# Patient Record
Sex: FEMALE | Race: WHITE | NOT HISPANIC OR LATINO | Employment: OTHER | URBAN - METROPOLITAN AREA
[De-identification: names, ages, dates, MRNs, and addresses within clinical notes are randomized per-mention and may not be internally consistent; named-entity substitution may affect disease eponyms.]

---

## 2024-10-20 ENCOUNTER — HOSPITAL ENCOUNTER (INPATIENT)
Facility: HOSPITAL | Age: 79
DRG: 180 | End: 2024-10-20
Attending: STUDENT IN AN ORGANIZED HEALTH CARE EDUCATION/TRAINING PROGRAM | Admitting: STUDENT IN AN ORGANIZED HEALTH CARE EDUCATION/TRAINING PROGRAM
Payer: MEDICARE

## 2024-10-20 ENCOUNTER — APPOINTMENT (OUTPATIENT)
Dept: RADIOLOGY | Facility: HOSPITAL | Age: 79
DRG: 180 | End: 2024-10-20
Payer: MEDICARE

## 2024-10-20 ENCOUNTER — APPOINTMENT (OUTPATIENT)
Dept: CARDIOLOGY | Facility: HOSPITAL | Age: 79
DRG: 180 | End: 2024-10-20
Payer: MEDICARE

## 2024-10-20 DIAGNOSIS — R06.00 DYSPNEA, UNSPECIFIED TYPE: ICD-10-CM

## 2024-10-20 DIAGNOSIS — J18.9 PNEUMONIA OF LEFT UPPER LOBE DUE TO INFECTIOUS ORGANISM: ICD-10-CM

## 2024-10-20 DIAGNOSIS — I51.89 LEFT ATRIAL MASS: ICD-10-CM

## 2024-10-20 DIAGNOSIS — R91.8 LUNG MASS: ICD-10-CM

## 2024-10-20 DIAGNOSIS — I51.3 LEFT HEART THROMBUS: ICD-10-CM

## 2024-10-20 DIAGNOSIS — R11.2 NAUSEA AND VOMITING, UNSPECIFIED VOMITING TYPE: Primary | ICD-10-CM

## 2024-10-20 PROBLEM — Z72.0 TOBACCO USE: Status: ACTIVE | Noted: 2024-10-20

## 2024-10-20 PROBLEM — E87.6 HYPOKALEMIA: Status: ACTIVE | Noted: 2024-10-20

## 2024-10-20 PROBLEM — E11.9 TYPE 2 DIABETES MELLITUS: Status: ACTIVE | Noted: 2024-10-20

## 2024-10-20 PROBLEM — R79.89 ELEVATED TROPONIN: Status: ACTIVE | Noted: 2024-10-20

## 2024-10-20 PROBLEM — E27.9 ADRENAL NODULE: Status: ACTIVE | Noted: 2024-10-20

## 2024-10-20 PROBLEM — C79.9 METASTATIC DISEASE (MULTI): Status: ACTIVE | Noted: 2024-10-20

## 2024-10-20 PROBLEM — R94.31 PROLONGED Q-T INTERVAL ON ECG: Status: ACTIVE | Noted: 2024-10-20

## 2024-10-20 PROBLEM — D64.9 ACUTE ANEMIA: Status: ACTIVE | Noted: 2024-10-20

## 2024-10-20 PROBLEM — E03.9 HYPOTHYROIDISM: Status: ACTIVE | Noted: 2024-10-20

## 2024-10-20 PROBLEM — R00.0 SINUS TACHYCARDIA: Status: ACTIVE | Noted: 2024-10-20

## 2024-10-20 PROBLEM — N85.00 ENDOMETRIAL HYPERPLASIA: Status: ACTIVE | Noted: 2024-10-20

## 2024-10-20 PROBLEM — E87.20 LACTIC ACIDOSIS: Status: ACTIVE | Noted: 2024-10-20

## 2024-10-20 PROBLEM — E83.42 HYPOMAGNESEMIA: Status: ACTIVE | Noted: 2024-10-20

## 2024-10-20 PROBLEM — E87.1 HYPONATREMIA: Status: ACTIVE | Noted: 2024-10-20

## 2024-10-20 LAB
ALBUMIN SERPL BCP-MCNC: 3.3 G/DL (ref 3.4–5)
ALP SERPL-CCNC: 56 U/L (ref 33–136)
ALT SERPL W P-5'-P-CCNC: 4 U/L (ref 7–45)
ANION GAP SERPL CALCULATED.3IONS-SCNC: 19 MMOL/L (ref 10–20)
APTT PPP: 28.6 SECONDS (ref 22–32.5)
AST SERPL W P-5'-P-CCNC: 11 U/L (ref 9–39)
BASOPHILS # BLD AUTO: 0.13 X10*3/UL (ref 0–0.1)
BASOPHILS NFR BLD AUTO: 1.1 %
BILIRUB SERPL-MCNC: 0.7 MG/DL (ref 0–1.2)
BNP SERPL-MCNC: 45 PG/ML (ref 0–99)
BUN SERPL-MCNC: 7 MG/DL (ref 6–23)
CALCIUM SERPL-MCNC: 10.3 MG/DL (ref 8.6–10.3)
CARDIAC TROPONIN I PNL SERPL HS: 15 NG/L (ref 0–13)
CARDIAC TROPONIN I PNL SERPL HS: 15 NG/L (ref 0–13)
CHLORIDE SERPL-SCNC: 86 MMOL/L (ref 98–107)
CO2 SERPL-SCNC: 29 MMOL/L (ref 21–32)
CREAT SERPL-MCNC: 0.74 MG/DL (ref 0.5–1.05)
EGFRCR SERPLBLD CKD-EPI 2021: 83 ML/MIN/1.73M*2
EOSINOPHIL # BLD AUTO: 0.09 X10*3/UL (ref 0–0.4)
EOSINOPHIL NFR BLD AUTO: 0.8 %
ERYTHROCYTE [DISTWIDTH] IN BLOOD BY AUTOMATED COUNT: 14.5 % (ref 11.5–14.5)
ERYTHROCYTE [DISTWIDTH] IN BLOOD BY AUTOMATED COUNT: 14.6 % (ref 11.5–14.5)
FLUAV RNA RESP QL NAA+PROBE: NOT DETECTED
FLUBV RNA RESP QL NAA+PROBE: NOT DETECTED
GLUCOSE SERPL-MCNC: 215 MG/DL (ref 74–99)
HCT VFR BLD AUTO: 30.5 % (ref 36–46)
HCT VFR BLD AUTO: 37.4 % (ref 36–46)
HGB BLD-MCNC: 12.5 G/DL (ref 12–16)
HGB BLD-MCNC: 9.8 G/DL (ref 12–16)
IMM GRANULOCYTES # BLD AUTO: 0.06 X10*3/UL (ref 0–0.5)
IMM GRANULOCYTES NFR BLD AUTO: 0.5 % (ref 0–0.9)
LACTATE SERPL-SCNC: 1.2 MMOL/L (ref 0.4–2)
LACTATE SERPL-SCNC: 4.3 MMOL/L (ref 0.4–2)
LYMPHOCYTES # BLD AUTO: 2.01 X10*3/UL (ref 0.8–3)
LYMPHOCYTES NFR BLD AUTO: 16.8 %
MAGNESIUM SERPL-MCNC: 1.46 MG/DL (ref 1.6–2.4)
MCH RBC QN AUTO: 29.8 PG (ref 26–34)
MCH RBC QN AUTO: 30 PG (ref 26–34)
MCHC RBC AUTO-ENTMCNC: 32.1 G/DL (ref 32–36)
MCHC RBC AUTO-ENTMCNC: 33.4 G/DL (ref 32–36)
MCV RBC AUTO: 90 FL (ref 80–100)
MCV RBC AUTO: 93 FL (ref 80–100)
MONOCYTES # BLD AUTO: 1.36 X10*3/UL (ref 0.05–0.8)
MONOCYTES NFR BLD AUTO: 11.4 %
NEUTROPHILS # BLD AUTO: 8.29 X10*3/UL (ref 1.6–5.5)
NEUTROPHILS NFR BLD AUTO: 69.4 %
NRBC BLD-RTO: 0 /100 WBCS (ref 0–0)
NRBC BLD-RTO: 0 /100 WBCS (ref 0–0)
PLATELET # BLD AUTO: 341 X10*3/UL (ref 150–450)
PLATELET # BLD AUTO: 457 X10*3/UL (ref 150–450)
POTASSIUM SERPL-SCNC: 2.7 MMOL/L (ref 3.5–5.3)
PROT SERPL-MCNC: 8.4 G/DL (ref 6.4–8.2)
RBC # BLD AUTO: 3.29 X10*6/UL (ref 4–5.2)
RBC # BLD AUTO: 4.17 X10*6/UL (ref 4–5.2)
SARS-COV-2 RNA RESP QL NAA+PROBE: NOT DETECTED
SODIUM SERPL-SCNC: 131 MMOL/L (ref 136–145)
TSH SERPL-ACNC: 0.66 MIU/L (ref 0.44–3.98)
WBC # BLD AUTO: 11.9 X10*3/UL (ref 4.4–11.3)
WBC # BLD AUTO: 9.8 X10*3/UL (ref 4.4–11.3)

## 2024-10-20 PROCEDURE — 84484 ASSAY OF TROPONIN QUANT: CPT | Performed by: STUDENT IN AN ORGANIZED HEALTH CARE EDUCATION/TRAINING PROGRAM

## 2024-10-20 PROCEDURE — 83036 HEMOGLOBIN GLYCOSYLATED A1C: CPT | Mod: WESLAB | Performed by: STUDENT IN AN ORGANIZED HEALTH CARE EDUCATION/TRAINING PROGRAM

## 2024-10-20 PROCEDURE — 99223 1ST HOSP IP/OBS HIGH 75: CPT | Performed by: STUDENT IN AN ORGANIZED HEALTH CARE EDUCATION/TRAINING PROGRAM

## 2024-10-20 PROCEDURE — 99285 EMERGENCY DEPT VISIT HI MDM: CPT | Mod: 25

## 2024-10-20 PROCEDURE — 36415 COLL VENOUS BLD VENIPUNCTURE: CPT | Performed by: STUDENT IN AN ORGANIZED HEALTH CARE EDUCATION/TRAINING PROGRAM

## 2024-10-20 PROCEDURE — 83605 ASSAY OF LACTIC ACID: CPT | Performed by: STUDENT IN AN ORGANIZED HEALTH CARE EDUCATION/TRAINING PROGRAM

## 2024-10-20 PROCEDURE — 85025 COMPLETE CBC W/AUTO DIFF WBC: CPT | Performed by: STUDENT IN AN ORGANIZED HEALTH CARE EDUCATION/TRAINING PROGRAM

## 2024-10-20 PROCEDURE — 80053 COMPREHEN METABOLIC PANEL: CPT | Performed by: STUDENT IN AN ORGANIZED HEALTH CARE EDUCATION/TRAINING PROGRAM

## 2024-10-20 PROCEDURE — 74177 CT ABD & PELVIS W/CONTRAST: CPT | Performed by: RADIOLOGY

## 2024-10-20 PROCEDURE — 96366 THER/PROPH/DIAG IV INF ADDON: CPT

## 2024-10-20 PROCEDURE — 96365 THER/PROPH/DIAG IV INF INIT: CPT

## 2024-10-20 PROCEDURE — 85730 THROMBOPLASTIN TIME PARTIAL: CPT | Performed by: STUDENT IN AN ORGANIZED HEALTH CARE EDUCATION/TRAINING PROGRAM

## 2024-10-20 PROCEDURE — 71260 CT THORAX DX C+: CPT | Performed by: RADIOLOGY

## 2024-10-20 PROCEDURE — 87040 BLOOD CULTURE FOR BACTERIA: CPT | Mod: WESLAB | Performed by: STUDENT IN AN ORGANIZED HEALTH CARE EDUCATION/TRAINING PROGRAM

## 2024-10-20 PROCEDURE — 74177 CT ABD & PELVIS W/CONTRAST: CPT

## 2024-10-20 PROCEDURE — 83880 ASSAY OF NATRIURETIC PEPTIDE: CPT | Performed by: STUDENT IN AN ORGANIZED HEALTH CARE EDUCATION/TRAINING PROGRAM

## 2024-10-20 PROCEDURE — 2060000001 HC INTERMEDIATE ICU ROOM DAILY

## 2024-10-20 PROCEDURE — 71045 X-RAY EXAM CHEST 1 VIEW: CPT

## 2024-10-20 PROCEDURE — 96367 TX/PROPH/DG ADDL SEQ IV INF: CPT

## 2024-10-20 PROCEDURE — 83735 ASSAY OF MAGNESIUM: CPT | Performed by: STUDENT IN AN ORGANIZED HEALTH CARE EDUCATION/TRAINING PROGRAM

## 2024-10-20 PROCEDURE — 87636 SARSCOV2 & INF A&B AMP PRB: CPT | Performed by: STUDENT IN AN ORGANIZED HEALTH CARE EDUCATION/TRAINING PROGRAM

## 2024-10-20 PROCEDURE — 96375 TX/PRO/DX INJ NEW DRUG ADDON: CPT

## 2024-10-20 PROCEDURE — 93005 ELECTROCARDIOGRAM TRACING: CPT

## 2024-10-20 PROCEDURE — 84443 ASSAY THYROID STIM HORMONE: CPT | Performed by: STUDENT IN AN ORGANIZED HEALTH CARE EDUCATION/TRAINING PROGRAM

## 2024-10-20 PROCEDURE — 2500000001 HC RX 250 WO HCPCS SELF ADMINISTERED DRUGS (ALT 637 FOR MEDICARE OP): Performed by: STUDENT IN AN ORGANIZED HEALTH CARE EDUCATION/TRAINING PROGRAM

## 2024-10-20 PROCEDURE — 85027 COMPLETE CBC AUTOMATED: CPT | Performed by: STUDENT IN AN ORGANIZED HEALTH CARE EDUCATION/TRAINING PROGRAM

## 2024-10-20 PROCEDURE — 2550000001 HC RX 255 CONTRASTS: Performed by: STUDENT IN AN ORGANIZED HEALTH CARE EDUCATION/TRAINING PROGRAM

## 2024-10-20 PROCEDURE — 93010 ELECTROCARDIOGRAM REPORT: CPT | Performed by: INTERNAL MEDICINE

## 2024-10-20 PROCEDURE — 71045 X-RAY EXAM CHEST 1 VIEW: CPT | Performed by: STUDENT IN AN ORGANIZED HEALTH CARE EDUCATION/TRAINING PROGRAM

## 2024-10-20 PROCEDURE — 2500000004 HC RX 250 GENERAL PHARMACY W/ HCPCS (ALT 636 FOR OP/ED): Performed by: STUDENT IN AN ORGANIZED HEALTH CARE EDUCATION/TRAINING PROGRAM

## 2024-10-20 PROCEDURE — 96361 HYDRATE IV INFUSION ADD-ON: CPT

## 2024-10-20 RX ORDER — POLYETHYLENE GLYCOL 3350 17 G/17G
17 POWDER, FOR SOLUTION ORAL DAILY
Status: DISCONTINUED | OUTPATIENT
Start: 2024-10-21 | End: 2024-10-30 | Stop reason: HOSPADM

## 2024-10-20 RX ORDER — POTASSIUM CHLORIDE 14.9 MG/ML
20 INJECTION INTRAVENOUS ONCE
Status: COMPLETED | OUTPATIENT
Start: 2024-10-20 | End: 2024-10-20

## 2024-10-20 RX ORDER — LEVOTHYROXINE SODIUM 150 UG/1
150 TABLET ORAL DAILY
Status: DISCONTINUED | OUTPATIENT
Start: 2024-10-21 | End: 2024-10-30 | Stop reason: HOSPADM

## 2024-10-20 RX ORDER — LORAZEPAM 2 MG/ML
0.5 INJECTION INTRAMUSCULAR EVERY 4 HOURS PRN
Status: DISCONTINUED | OUTPATIENT
Start: 2024-10-20 | End: 2024-10-30 | Stop reason: HOSPADM

## 2024-10-20 RX ORDER — MECLIZINE HYDROCHLORIDE 25 MG/1
25 TABLET ORAL 3 TIMES DAILY PRN
Status: DISCONTINUED | OUTPATIENT
Start: 2024-10-20 | End: 2024-10-30 | Stop reason: HOSPADM

## 2024-10-20 RX ORDER — ACETAMINOPHEN 160 MG/5ML
650 SOLUTION ORAL EVERY 4 HOURS PRN
Status: DISCONTINUED | OUTPATIENT
Start: 2024-10-20 | End: 2024-10-30 | Stop reason: HOSPADM

## 2024-10-20 RX ORDER — HYDROCHLOROTHIAZIDE 25 MG/1
25 TABLET ORAL DAILY
Status: DISCONTINUED | OUTPATIENT
Start: 2024-10-21 | End: 2024-10-20

## 2024-10-20 RX ORDER — PROCHLORPERAZINE EDISYLATE 5 MG/ML
10 INJECTION INTRAMUSCULAR; INTRAVENOUS ONCE
Status: COMPLETED | OUTPATIENT
Start: 2024-10-20 | End: 2024-10-20

## 2024-10-20 RX ORDER — METOPROLOL SUCCINATE 50 MG/1
50 TABLET, EXTENDED RELEASE ORAL 2 TIMES DAILY
COMMUNITY
Start: 2024-10-03

## 2024-10-20 RX ORDER — HEPARIN SODIUM 5000 [USP'U]/ML
80 INJECTION, SOLUTION INTRAVENOUS; SUBCUTANEOUS ONCE
Status: COMPLETED | OUTPATIENT
Start: 2024-10-20 | End: 2024-10-20

## 2024-10-20 RX ORDER — POTASSIUM CHLORIDE 1.5 G/1.58G
40 POWDER, FOR SOLUTION ORAL ONCE
Status: COMPLETED | OUTPATIENT
Start: 2024-10-20 | End: 2024-10-20

## 2024-10-20 RX ORDER — TALC
3 POWDER (GRAM) TOPICAL NIGHTLY PRN
Status: DISCONTINUED | OUTPATIENT
Start: 2024-10-20 | End: 2024-10-30 | Stop reason: HOSPADM

## 2024-10-20 RX ORDER — VANCOMYCIN 2 G/400ML
2 INJECTION, SOLUTION INTRAVENOUS ONCE
Status: DISCONTINUED | OUTPATIENT
Start: 2024-10-20 | End: 2024-10-20

## 2024-10-20 RX ORDER — RABEPRAZOLE SODIUM 20 MG/1
1 TABLET, DELAYED RELEASE ORAL
COMMUNITY
Start: 2024-02-05 | End: 2024-10-30 | Stop reason: HOSPADM

## 2024-10-20 RX ORDER — GLIMEPIRIDE 4 MG/1
1 TABLET ORAL
COMMUNITY
Start: 2024-10-03 | End: 2024-10-30 | Stop reason: HOSPADM

## 2024-10-20 RX ORDER — HEPARIN SODIUM 10000 [USP'U]/100ML
0-4500 INJECTION, SOLUTION INTRAVENOUS CONTINUOUS
Status: DISCONTINUED | OUTPATIENT
Start: 2024-10-20 | End: 2024-10-25

## 2024-10-20 RX ORDER — LOPERAMIDE HYDROCHLORIDE 2 MG/1
2 CAPSULE ORAL EVERY 6 HOURS PRN
COMMUNITY
Start: 2024-10-03 | End: 2025-09-28

## 2024-10-20 RX ORDER — POTASSIUM CHLORIDE 750 MG/1
20 TABLET, FILM COATED, EXTENDED RELEASE ORAL
COMMUNITY
Start: 2024-10-03

## 2024-10-20 RX ORDER — ACETAMINOPHEN 650 MG/1
650 SUPPOSITORY RECTAL EVERY 4 HOURS PRN
Status: DISCONTINUED | OUTPATIENT
Start: 2024-10-20 | End: 2024-10-30 | Stop reason: HOSPADM

## 2024-10-20 RX ORDER — LORAZEPAM 2 MG/ML
0.5 INJECTION INTRAMUSCULAR EVERY 4 HOURS
Status: DISCONTINUED | OUTPATIENT
Start: 2024-10-20 | End: 2024-10-20

## 2024-10-20 RX ORDER — METOPROLOL SUCCINATE 50 MG/1
50 TABLET, EXTENDED RELEASE ORAL 2 TIMES DAILY
Status: DISCONTINUED | OUTPATIENT
Start: 2024-10-21 | End: 2024-10-30 | Stop reason: HOSPADM

## 2024-10-20 RX ORDER — VANCOMYCIN 1.5 G/300ML
1500 INJECTION, SOLUTION INTRAVENOUS ONCE
Status: COMPLETED | OUTPATIENT
Start: 2024-10-20 | End: 2024-10-20

## 2024-10-20 RX ORDER — ONDANSETRON HYDROCHLORIDE 2 MG/ML
4 INJECTION, SOLUTION INTRAVENOUS ONCE
Status: DISCONTINUED | OUTPATIENT
Start: 2024-10-20 | End: 2024-10-20

## 2024-10-20 RX ORDER — ACETAMINOPHEN 325 MG/1
975 TABLET ORAL ONCE
Status: COMPLETED | OUTPATIENT
Start: 2024-10-20 | End: 2024-10-20

## 2024-10-20 RX ORDER — PANTOPRAZOLE SODIUM 40 MG/1
40 TABLET, DELAYED RELEASE ORAL
Status: DISCONTINUED | OUTPATIENT
Start: 2024-10-21 | End: 2024-10-20

## 2024-10-20 RX ORDER — POTASSIUM CHLORIDE 20 MEQ/1
20 TABLET, EXTENDED RELEASE ORAL DAILY
Status: DISCONTINUED | OUTPATIENT
Start: 2024-10-21 | End: 2024-10-30 | Stop reason: HOSPADM

## 2024-10-20 RX ORDER — FAMOTIDINE 20 MG/1
20 TABLET, FILM COATED ORAL 2 TIMES DAILY
Status: DISCONTINUED | OUTPATIENT
Start: 2024-10-20 | End: 2024-10-21

## 2024-10-20 RX ORDER — HYDROCHLOROTHIAZIDE 25 MG/1
25 TABLET ORAL
COMMUNITY
Start: 2024-10-03 | End: 2024-10-30 | Stop reason: HOSPADM

## 2024-10-20 RX ORDER — MECLIZINE HYDROCHLORIDE 25 MG/1
25 TABLET ORAL 3 TIMES DAILY PRN
COMMUNITY
Start: 2024-05-23

## 2024-10-20 RX ORDER — DIPHENHYDRAMINE HYDROCHLORIDE 50 MG/ML
25 INJECTION INTRAMUSCULAR; INTRAVENOUS ONCE
Status: COMPLETED | OUTPATIENT
Start: 2024-10-20 | End: 2024-10-20

## 2024-10-20 RX ORDER — PANTOPRAZOLE SODIUM 40 MG/10ML
40 INJECTION, POWDER, LYOPHILIZED, FOR SOLUTION INTRAVENOUS 2 TIMES DAILY
Status: DISCONTINUED | OUTPATIENT
Start: 2024-10-20 | End: 2024-10-20

## 2024-10-20 RX ORDER — ACETAMINOPHEN 325 MG/1
650 TABLET ORAL EVERY 4 HOURS PRN
Status: DISCONTINUED | OUTPATIENT
Start: 2024-10-20 | End: 2024-10-30 | Stop reason: HOSPADM

## 2024-10-20 RX ORDER — LEVOTHYROXINE SODIUM 150 UG/1
150 TABLET ORAL
COMMUNITY
Start: 2024-10-03

## 2024-10-20 RX ORDER — HEPARIN SODIUM 5000 [USP'U]/ML
3000-6000 INJECTION, SOLUTION INTRAVENOUS; SUBCUTANEOUS AS NEEDED
Status: DISCONTINUED | OUTPATIENT
Start: 2024-10-20 | End: 2024-10-25

## 2024-10-20 SDOH — SOCIAL STABILITY: SOCIAL INSECURITY
WITHIN THE LAST YEAR, HAVE YOU BEEN KICKED, HIT, SLAPPED, OR OTHERWISE PHYSICALLY HURT BY YOUR PARTNER OR EX-PARTNER?: NO

## 2024-10-20 SDOH — ECONOMIC STABILITY: FOOD INSECURITY: WITHIN THE PAST 12 MONTHS, YOU WORRIED THAT YOUR FOOD WOULD RUN OUT BEFORE YOU GOT THE MONEY TO BUY MORE.: NEVER TRUE

## 2024-10-20 SDOH — ECONOMIC STABILITY: INCOME INSECURITY: IN THE PAST 12 MONTHS HAS THE ELECTRIC, GAS, OIL, OR WATER COMPANY THREATENED TO SHUT OFF SERVICES IN YOUR HOME?: NO

## 2024-10-20 SDOH — SOCIAL STABILITY: SOCIAL INSECURITY: HAVE YOU HAD THOUGHTS OF HARMING ANYONE ELSE?: NO

## 2024-10-20 SDOH — ECONOMIC STABILITY: HOUSING INSECURITY: IN THE PAST 12 MONTHS, HOW MANY TIMES HAVE YOU MOVED WHERE YOU WERE LIVING?: 0

## 2024-10-20 SDOH — ECONOMIC STABILITY: TRANSPORTATION INSECURITY: IN THE PAST 12 MONTHS, HAS LACK OF TRANSPORTATION KEPT YOU FROM MEDICAL APPOINTMENTS OR FROM GETTING MEDICATIONS?: NO

## 2024-10-20 SDOH — ECONOMIC STABILITY: FOOD INSECURITY: WITHIN THE PAST 12 MONTHS, THE FOOD YOU BOUGHT JUST DIDN'T LAST AND YOU DIDN'T HAVE MONEY TO GET MORE.: NEVER TRUE

## 2024-10-20 SDOH — ECONOMIC STABILITY: HOUSING INSECURITY: IN THE LAST 12 MONTHS, WAS THERE A TIME WHEN YOU WERE NOT ABLE TO PAY THE MORTGAGE OR RENT ON TIME?: NO

## 2024-10-20 SDOH — ECONOMIC STABILITY: FOOD INSECURITY: HOW HARD IS IT FOR YOU TO PAY FOR THE VERY BASICS LIKE FOOD, HOUSING, MEDICAL CARE, AND HEATING?: NOT HARD AT ALL

## 2024-10-20 SDOH — SOCIAL STABILITY: SOCIAL INSECURITY
WITHIN THE LAST YEAR, HAVE YOU BEEN RAPED OR FORCED TO HAVE ANY KIND OF SEXUAL ACTIVITY BY YOUR PARTNER OR EX-PARTNER?: NO

## 2024-10-20 SDOH — SOCIAL STABILITY: SOCIAL INSECURITY: WITHIN THE LAST YEAR, HAVE YOU BEEN HUMILIATED OR EMOTIONALLY ABUSED IN OTHER WAYS BY YOUR PARTNER OR EX-PARTNER?: NO

## 2024-10-20 SDOH — SOCIAL STABILITY: SOCIAL INSECURITY: WITHIN THE LAST YEAR, HAVE YOU BEEN AFRAID OF YOUR PARTNER OR EX-PARTNER?: NO

## 2024-10-20 SDOH — ECONOMIC STABILITY: HOUSING INSECURITY: AT ANY TIME IN THE PAST 12 MONTHS, WERE YOU HOMELESS OR LIVING IN A SHELTER (INCLUDING NOW)?: NO

## 2024-10-20 ASSESSMENT — COGNITIVE AND FUNCTIONAL STATUS - GENERAL
DRESSING REGULAR UPPER BODY CLOTHING: A LITTLE
TOILETING: A LITTLE
DAILY ACTIVITIY SCORE: 18
HELP NEEDED FOR BATHING: A LITTLE
WALKING IN HOSPITAL ROOM: A LOT
EATING MEALS: A LITTLE
STANDING UP FROM CHAIR USING ARMS: A LOT
DRESSING REGULAR LOWER BODY CLOTHING: A LITTLE
CLIMB 3 TO 5 STEPS WITH RAILING: A LOT
TURNING FROM BACK TO SIDE WHILE IN FLAT BAD: A LITTLE
PATIENT BASELINE BEDBOUND: NO
PERSONAL GROOMING: A LITTLE
MOBILITY SCORE: 16
MOVING TO AND FROM BED TO CHAIR: A LITTLE

## 2024-10-20 ASSESSMENT — PAIN DESCRIPTION - LOCATION: LOCATION: GENERALIZED

## 2024-10-20 ASSESSMENT — LIFESTYLE VARIABLES
HOW MANY STANDARD DRINKS CONTAINING ALCOHOL DO YOU HAVE ON A TYPICAL DAY: PATIENT DOES NOT DRINK
SKIP TO QUESTIONS 9-10: 1
AUDIT-C TOTAL SCORE: 0
AUDIT-C TOTAL SCORE: 0
SUBSTANCE_ABUSE_PAST_12_MONTHS: NO
PRESCIPTION_ABUSE_PAST_12_MONTHS: NO
HOW OFTEN DO YOU HAVE 6 OR MORE DRINKS ON ONE OCCASION: NEVER
HOW OFTEN DO YOU HAVE A DRINK CONTAINING ALCOHOL: NEVER

## 2024-10-20 ASSESSMENT — ACTIVITIES OF DAILY LIVING (ADL)
HEARING - RIGHT EAR: FUNCTIONAL
LACK_OF_TRANSPORTATION: NO
BATHING: NEEDS ASSISTANCE
GROOMING: NEEDS ASSISTANCE
DRESSING YOURSELF: NEEDS ASSISTANCE
PATIENT'S MEMORY ADEQUATE TO SAFELY COMPLETE DAILY ACTIVITIES?: NO
TOILETING: NEEDS ASSISTANCE
ADEQUATE_TO_COMPLETE_ADL: NO
ASSISTIVE_DEVICE: WALKER
JUDGMENT_ADEQUATE_SAFELY_COMPLETE_DAILY_ACTIVITIES: YES
WALKS IN HOME: NEEDS ASSISTANCE
FEEDING YOURSELF: NEEDS ASSISTANCE
HEARING - LEFT EAR: FUNCTIONAL

## 2024-10-20 ASSESSMENT — COLUMBIA-SUICIDE SEVERITY RATING SCALE - C-SSRS
6. HAVE YOU EVER DONE ANYTHING, STARTED TO DO ANYTHING, OR PREPARED TO DO ANYTHING TO END YOUR LIFE?: NO
2. HAVE YOU ACTUALLY HAD ANY THOUGHTS OF KILLING YOURSELF?: NO
1. IN THE PAST MONTH, HAVE YOU WISHED YOU WERE DEAD OR WISHED YOU COULD GO TO SLEEP AND NOT WAKE UP?: NO

## 2024-10-20 ASSESSMENT — PATIENT HEALTH QUESTIONNAIRE - PHQ9
1. LITTLE INTEREST OR PLEASURE IN DOING THINGS: SEVERAL DAYS
SUM OF ALL RESPONSES TO PHQ9 QUESTIONS 1 & 2: 2
2. FEELING DOWN, DEPRESSED OR HOPELESS: SEVERAL DAYS

## 2024-10-20 ASSESSMENT — PAIN DESCRIPTION - DESCRIPTORS
DESCRIPTORS: ACHING
DESCRIPTORS: SORE

## 2024-10-20 ASSESSMENT — PAIN - FUNCTIONAL ASSESSMENT
PAIN_FUNCTIONAL_ASSESSMENT: 0-10
PAIN_FUNCTIONAL_ASSESSMENT: 0-10

## 2024-10-20 ASSESSMENT — PAIN SCALES - GENERAL
PAINLEVEL_OUTOF10: 7
PAINLEVEL_OUTOF10: 7

## 2024-10-20 NOTE — ED PROVIDER NOTES
HPI   Chief Complaint   Patient presents with    Vomiting    Weakness, Gen    Dizziness       Patient presents with nausea, vomiting, weakness, shortness of breath, and pain all over.  She states that she is feeling very unwell.  She had vomiting today and was lightheaded when she tried to get up.              Patient History   No past medical history on file.  No past surgical history on file.  No family history on file.  Social History     Tobacco Use    Smoking status: Not on file    Smokeless tobacco: Not on file   Substance Use Topics    Alcohol use: Not on file    Drug use: Not on file       Physical Exam   ED Triage Vitals [10/20/24 1520]   Temperature Heart Rate Respirations BP   37.5 °C (99.5 °F) (!) 135 18 97/67      Pulse Ox Temp Source Heart Rate Source Patient Position   99 % Axillary -- --      BP Location FiO2 (%)     -- --       Physical Exam  HENT:      Head: Normocephalic.   Eyes:      General: No scleral icterus.  Cardiovascular:      Rate and Rhythm: Normal rate.   Pulmonary:      Comments: Some expiratory wheezes heard  Abdominal:      Comments: Mild upper abdominal tenderness to palpation   Neurological:      Mental Status: She is alert.      Comments: Patient awake, alert, answers questions appropriately.  Moves all extremities.           ED Course & MDM   ED Course as of 10/20/24 2336   Sun Oct 20, 2024   1537 EKG interpreted by me: Sinus tachycardia with PVCs, rate 109.  Normal axis.  No significant ST or T wave abnormalities.  QTc calculated by me at 431 milliseconds [ML]      ED Course User Index  [ML] Tae Suarez MD         Diagnoses as of 10/20/24 2336   Nausea and vomiting, unspecified vomiting type   Dyspnea, unspecified type   Lung mass   Left heart thrombus                 No data recorded     Naylor Coma Scale Score: 15 (10/20/24 1522 : Lorie Mariee, NAHUN)                           Medical Decision Making  Chest x-ray interpreted by me and verified by radiology reveals likely  lung mass on the right side.  CT of the chest was obtained which reveals large lung mass with necrotic center, likely metastatic disease, as well as extension into the ventricle of the heart.  It is unclear if this is a mass or thrombus.  With possibility of thrombus, I did order heparin drip.  Patient was given antibiotics given her mild leukocytosis as well as tachycardia and initially low blood pressure on arrival.  Patient accepted to medicine service for further evaluation.  Parts of this chart were completed with dictation software, please excuse any errors in transcription.        Procedure  Procedures     Tae Suarez MD  10/20/24 7459

## 2024-10-20 NOTE — ED TRIAGE NOTES
Pt states that she is having dizziness, weakness, and constant nausea. Pt is unable to sit up without assistance. Pt was helped out of car.

## 2024-10-20 NOTE — CONSULTS
"Vancomycin Dosing by Pharmacy- EMERGENCY DEPARTMENT    Dian Perez is a 79 y.o. year old female who Pharmacy has been consulted to give a ONE TIME ONLY vancomycin dose in the Emergency Department for \"uncertain\".     Visit Vitals  BP 97/67   Pulse (!) 135   Temp 37.5 °C (99.5 °F) (Axillary)   Resp 18        No results found for: \"CREATININE\"     Patient weight is   Wt Readings from Last 1 Encounters:   10/20/24 77.1 kg (170 lb)        Assessment/Plan     Patient will be given a one time loading dose of 1500 mg  Pharmacy will sign off at this time. If vancomycin is to be continued, please re-consult Pharmacy.       Esthela Lara, PharmD     "

## 2024-10-21 ENCOUNTER — APPOINTMENT (OUTPATIENT)
Dept: CARDIOLOGY | Facility: HOSPITAL | Age: 79
DRG: 180 | End: 2024-10-21
Payer: MEDICARE

## 2024-10-21 PROBLEM — N39.0 UTI (URINARY TRACT INFECTION): Status: ACTIVE | Noted: 2024-10-21

## 2024-10-21 LAB
ANION GAP SERPL CALCULATED.3IONS-SCNC: 11 MMOL/L (ref 10–20)
APPEARANCE UR: CLEAR
APTT PPP: 137.8 SECONDS (ref 22–32.5)
APTT PPP: 42.7 SECONDS (ref 22–32.5)
APTT PPP: 86.8 SECONDS (ref 22–32.5)
ATRIAL RATE: 109 BPM
BILIRUB UR STRIP.AUTO-MCNC: NEGATIVE MG/DL
BUN SERPL-MCNC: 5 MG/DL (ref 6–23)
CALCIUM SERPL-MCNC: 9 MG/DL (ref 8.6–10.3)
CANCER AG125 SERPL-ACNC: 77.8 U/ML (ref 0–30.2)
CANCER AG19-9 SERPL-ACNC: 49.4 U/ML
CEA SERPL-MCNC: 10.6 UG/L
CHLORIDE SERPL-SCNC: 98 MMOL/L (ref 98–107)
CK SERPL-CCNC: 12 U/L (ref 0–215)
CO2 SERPL-SCNC: 30 MMOL/L (ref 21–32)
COLOR UR: COLORLESS
CREAT SERPL-MCNC: 0.49 MG/DL (ref 0.5–1.05)
EGFRCR SERPLBLD CKD-EPI 2021: >90 ML/MIN/1.73M*2
ERYTHROCYTE [DISTWIDTH] IN BLOOD BY AUTOMATED COUNT: 14.7 % (ref 11.5–14.5)
EST. AVERAGE GLUCOSE BLD GHB EST-MCNC: 143 MG/DL
FERRITIN SERPL-MCNC: 440 NG/ML (ref 8–150)
FOLATE SERPL-MCNC: 3.5 NG/ML
GLUCOSE BLD MANUAL STRIP-MCNC: 157 MG/DL (ref 74–99)
GLUCOSE BLD MANUAL STRIP-MCNC: 166 MG/DL (ref 74–99)
GLUCOSE BLD MANUAL STRIP-MCNC: 180 MG/DL (ref 74–99)
GLUCOSE BLD MANUAL STRIP-MCNC: 98 MG/DL (ref 74–99)
GLUCOSE SERPL-MCNC: 89 MG/DL (ref 74–99)
GLUCOSE UR STRIP.AUTO-MCNC: NORMAL MG/DL
HBA1C MFR BLD: 6.6 %
HCT VFR BLD AUTO: 31.8 % (ref 36–46)
HCT VFR BLD AUTO: 32.2 % (ref 36–46)
HEMOCCULT SP1 STL QL: NEGATIVE
HGB BLD-MCNC: 10.4 G/DL (ref 12–16)
HOLD SPECIMEN: NORMAL
IRON SATN MFR SERPL: 19 % (ref 25–45)
IRON SERPL-MCNC: 34 UG/DL (ref 35–150)
KETONES UR STRIP.AUTO-MCNC: NEGATIVE MG/DL
LEUKOCYTE ESTERASE UR QL STRIP.AUTO: NEGATIVE
MCH RBC QN AUTO: 30.5 PG (ref 26–34)
MCHC RBC AUTO-ENTMCNC: 32.7 G/DL (ref 32–36)
MCV RBC AUTO: 93 FL (ref 80–100)
NITRITE UR QL STRIP.AUTO: ABNORMAL
NRBC BLD-RTO: 0 /100 WBCS (ref 0–0)
PH UR STRIP.AUTO: 5.5 [PH]
PLATELET # BLD AUTO: 357 X10*3/UL (ref 150–450)
POTASSIUM SERPL-SCNC: 3 MMOL/L (ref 3.5–5.3)
PR INTERVAL: 128 MS
PROT UR STRIP.AUTO-MCNC: NEGATIVE MG/DL
Q ONSET: 228 MS
QRS COUNT: 18 BEATS
QRS DURATION: 84 MS
QT INTERVAL: 458 MS
QTC CALCULATION(BAZETT): 616 MS
QTC FREDERICIA: 558 MS
R AXIS: 66 DEGREES
RBC # BLD AUTO: 3.41 X10*6/UL (ref 4–5.2)
RBC # UR STRIP.AUTO: NEGATIVE /UL
RBC #/AREA URNS AUTO: NORMAL /HPF
SODIUM SERPL-SCNC: 136 MMOL/L (ref 136–145)
SP GR UR STRIP.AUTO: 1.03
SQUAMOUS #/AREA URNS AUTO: NORMAL /HPF
T AXIS: 62 DEGREES
T OFFSET: 457 MS
TIBC SERPL-MCNC: 179 UG/DL (ref 240–445)
TSH SERPL-ACNC: 0.61 MIU/L (ref 0.44–3.98)
UIBC SERPL-MCNC: 145 UG/DL (ref 110–370)
UROBILINOGEN UR STRIP.AUTO-MCNC: NORMAL MG/DL
VENTRICULAR RATE: 109 BPM
VIT B12 SERPL-MCNC: 411 PG/ML (ref 211–911)
WBC # BLD AUTO: 9.7 X10*3/UL (ref 4.4–11.3)
WBC #/AREA URNS AUTO: NORMAL /HPF

## 2024-10-21 PROCEDURE — 82607 VITAMIN B-12: CPT | Mod: WESLAB

## 2024-10-21 PROCEDURE — 86301 IMMUNOASSAY TUMOR CA 19-9: CPT | Mod: WESLAB | Performed by: INTERNAL MEDICINE

## 2024-10-21 PROCEDURE — 2500000002 HC RX 250 W HCPCS SELF ADMINISTERED DRUGS (ALT 637 FOR MEDICARE OP, ALT 636 FOR OP/ED): Performed by: NURSE PRACTITIONER

## 2024-10-21 PROCEDURE — 82728 ASSAY OF FERRITIN: CPT

## 2024-10-21 PROCEDURE — 99223 1ST HOSP IP/OBS HIGH 75: CPT | Performed by: NURSE PRACTITIONER

## 2024-10-21 PROCEDURE — 36415 COLL VENOUS BLD VENIPUNCTURE: CPT | Performed by: STUDENT IN AN ORGANIZED HEALTH CARE EDUCATION/TRAINING PROGRAM

## 2024-10-21 PROCEDURE — 87086 URINE CULTURE/COLONY COUNT: CPT | Mod: WESLAB | Performed by: STUDENT IN AN ORGANIZED HEALTH CARE EDUCATION/TRAINING PROGRAM

## 2024-10-21 PROCEDURE — 99223 1ST HOSP IP/OBS HIGH 75: CPT | Performed by: STUDENT IN AN ORGANIZED HEALTH CARE EDUCATION/TRAINING PROGRAM

## 2024-10-21 PROCEDURE — 93306 TTE W/DOPPLER COMPLETE: CPT

## 2024-10-21 PROCEDURE — 85730 THROMBOPLASTIN TIME PARTIAL: CPT | Performed by: STUDENT IN AN ORGANIZED HEALTH CARE EDUCATION/TRAINING PROGRAM

## 2024-10-21 PROCEDURE — 2060000001 HC INTERMEDIATE ICU ROOM DAILY

## 2024-10-21 PROCEDURE — 2500000002 HC RX 250 W HCPCS SELF ADMINISTERED DRUGS (ALT 637 FOR MEDICARE OP, ALT 636 FOR OP/ED): Performed by: STUDENT IN AN ORGANIZED HEALTH CARE EDUCATION/TRAINING PROGRAM

## 2024-10-21 PROCEDURE — 99497 ADVNCD CARE PLAN 30 MIN: CPT | Performed by: NURSE PRACTITIONER

## 2024-10-21 PROCEDURE — 99233 SBSQ HOSP IP/OBS HIGH 50: CPT | Performed by: STUDENT IN AN ORGANIZED HEALTH CARE EDUCATION/TRAINING PROGRAM

## 2024-10-21 PROCEDURE — 2500000004 HC RX 250 GENERAL PHARMACY W/ HCPCS (ALT 636 FOR OP/ED): Performed by: STUDENT IN AN ORGANIZED HEALTH CARE EDUCATION/TRAINING PROGRAM

## 2024-10-21 PROCEDURE — 94640 AIRWAY INHALATION TREATMENT: CPT

## 2024-10-21 PROCEDURE — 82374 ASSAY BLOOD CARBON DIOXIDE: CPT | Performed by: STUDENT IN AN ORGANIZED HEALTH CARE EDUCATION/TRAINING PROGRAM

## 2024-10-21 PROCEDURE — 85027 COMPLETE CBC AUTOMATED: CPT | Performed by: STUDENT IN AN ORGANIZED HEALTH CARE EDUCATION/TRAINING PROGRAM

## 2024-10-21 PROCEDURE — 82550 ASSAY OF CK (CPK): CPT | Mod: WESLAB | Performed by: NURSE PRACTITIONER

## 2024-10-21 PROCEDURE — 2500000001 HC RX 250 WO HCPCS SELF ADMINISTERED DRUGS (ALT 637 FOR MEDICARE OP): Performed by: STUDENT IN AN ORGANIZED HEALTH CARE EDUCATION/TRAINING PROGRAM

## 2024-10-21 PROCEDURE — 93005 ELECTROCARDIOGRAM TRACING: CPT

## 2024-10-21 PROCEDURE — 93306 TTE W/DOPPLER COMPLETE: CPT | Performed by: INTERNAL MEDICINE

## 2024-10-21 PROCEDURE — 82746 ASSAY OF FOLIC ACID SERUM: CPT | Mod: WESLAB

## 2024-10-21 PROCEDURE — 82947 ASSAY GLUCOSE BLOOD QUANT: CPT

## 2024-10-21 PROCEDURE — 99222 1ST HOSP IP/OBS MODERATE 55: CPT | Performed by: INTERNAL MEDICINE

## 2024-10-21 PROCEDURE — 83540 ASSAY OF IRON: CPT

## 2024-10-21 PROCEDURE — 82378 CARCINOEMBRYONIC ANTIGEN: CPT | Mod: WESLAB | Performed by: INTERNAL MEDICINE

## 2024-10-21 PROCEDURE — 82270 OCCULT BLOOD FECES: CPT | Performed by: STUDENT IN AN ORGANIZED HEALTH CARE EDUCATION/TRAINING PROGRAM

## 2024-10-21 PROCEDURE — 99223 1ST HOSP IP/OBS HIGH 75: CPT | Performed by: INTERNAL MEDICINE

## 2024-10-21 PROCEDURE — 2500000002 HC RX 250 W HCPCS SELF ADMINISTERED DRUGS (ALT 637 FOR MEDICARE OP, ALT 636 FOR OP/ED): Performed by: INTERNAL MEDICINE

## 2024-10-21 PROCEDURE — 86304 IMMUNOASSAY TUMOR CA 125: CPT | Mod: WESLAB | Performed by: INTERNAL MEDICINE

## 2024-10-21 PROCEDURE — 9420000001 HC RT PATIENT EDUCATION 5 MIN

## 2024-10-21 PROCEDURE — 81001 URINALYSIS AUTO W/SCOPE: CPT | Performed by: STUDENT IN AN ORGANIZED HEALTH CARE EDUCATION/TRAINING PROGRAM

## 2024-10-21 PROCEDURE — 85014 HEMATOCRIT: CPT | Performed by: STUDENT IN AN ORGANIZED HEALTH CARE EDUCATION/TRAINING PROGRAM

## 2024-10-21 RX ORDER — MAGNESIUM SULFATE HEPTAHYDRATE 40 MG/ML
2 INJECTION, SOLUTION INTRAVENOUS ONCE
Status: COMPLETED | OUTPATIENT
Start: 2024-10-21 | End: 2024-10-21

## 2024-10-21 RX ORDER — TRAMADOL HYDROCHLORIDE 50 MG/1
25 TABLET ORAL EVERY 4 HOURS PRN
Status: DISCONTINUED | OUTPATIENT
Start: 2024-10-21 | End: 2024-10-29

## 2024-10-21 RX ORDER — LORAZEPAM 1 MG/1
1 TABLET ORAL ONCE
Status: COMPLETED | OUTPATIENT
Start: 2024-10-21 | End: 2024-10-21

## 2024-10-21 RX ORDER — CEFTRIAXONE 2 G/50ML
2 INJECTION, SOLUTION INTRAVENOUS EVERY 24 HOURS
Status: DISCONTINUED | OUTPATIENT
Start: 2024-10-21 | End: 2024-10-21

## 2024-10-21 RX ORDER — FOLIC ACID 1 MG/1
1 TABLET ORAL DAILY
Status: DISCONTINUED | OUTPATIENT
Start: 2024-10-22 | End: 2024-10-30 | Stop reason: HOSPADM

## 2024-10-21 RX ORDER — WARFARIN SODIUM 5 MG/1
5 TABLET ORAL DAILY
Status: DISCONTINUED | OUTPATIENT
Start: 2024-10-21 | End: 2024-10-25

## 2024-10-21 RX ORDER — FLUTICASONE PROPIONATE 50 MCG
2 SPRAY, SUSPENSION (ML) NASAL DAILY
Status: DISCONTINUED | OUTPATIENT
Start: 2024-10-21 | End: 2024-10-30 | Stop reason: HOSPADM

## 2024-10-21 RX ORDER — ALBUTEROL SULFATE 0.83 MG/ML
2.5 SOLUTION RESPIRATORY (INHALATION) EVERY 4 HOURS PRN
Status: DISCONTINUED | OUTPATIENT
Start: 2024-10-21 | End: 2024-10-30 | Stop reason: HOSPADM

## 2024-10-21 RX ORDER — PANTOPRAZOLE SODIUM 40 MG/10ML
40 INJECTION, POWDER, LYOPHILIZED, FOR SOLUTION INTRAVENOUS 2 TIMES DAILY
Status: DISCONTINUED | OUTPATIENT
Start: 2024-10-21 | End: 2024-10-24

## 2024-10-21 RX ORDER — DEXTROSE 50 % IN WATER (D50W) INTRAVENOUS SYRINGE
12.5
Status: DISCONTINUED | OUTPATIENT
Start: 2024-10-21 | End: 2024-10-30 | Stop reason: HOSPADM

## 2024-10-21 RX ORDER — POTASSIUM CHLORIDE 20 MEQ/1
20 TABLET, EXTENDED RELEASE ORAL ONCE
Status: COMPLETED | OUTPATIENT
Start: 2024-10-21 | End: 2024-10-21

## 2024-10-21 RX ORDER — FORMOTEROL FUMARATE DIHYDRATE 20 UG/2ML
20 SOLUTION RESPIRATORY (INHALATION)
Status: DISCONTINUED | OUTPATIENT
Start: 2024-10-21 | End: 2024-10-30 | Stop reason: HOSPADM

## 2024-10-21 RX ORDER — DEXTROSE 50 % IN WATER (D50W) INTRAVENOUS SYRINGE
25
Status: DISCONTINUED | OUTPATIENT
Start: 2024-10-21 | End: 2024-10-30 | Stop reason: HOSPADM

## 2024-10-21 RX ORDER — INSULIN LISPRO 100 [IU]/ML
0-10 INJECTION, SOLUTION INTRAVENOUS; SUBCUTANEOUS
Status: DISCONTINUED | OUTPATIENT
Start: 2024-10-21 | End: 2024-10-24

## 2024-10-21 RX ORDER — BUDESONIDE 0.5 MG/2ML
0.5 INHALANT ORAL
Status: DISCONTINUED | OUTPATIENT
Start: 2024-10-21 | End: 2024-10-30 | Stop reason: HOSPADM

## 2024-10-21 SDOH — ECONOMIC STABILITY: HOUSING INSECURITY: AT ANY TIME IN THE PAST 12 MONTHS, WERE YOU HOMELESS OR LIVING IN A SHELTER (INCLUDING NOW)?: NO

## 2024-10-21 SDOH — ECONOMIC STABILITY: INCOME INSECURITY: IN THE PAST 12 MONTHS HAS THE ELECTRIC, GAS, OIL, OR WATER COMPANY THREATENED TO SHUT OFF SERVICES IN YOUR HOME?: NO

## 2024-10-21 SDOH — SOCIAL STABILITY: SOCIAL NETWORK: HOW OFTEN DO YOU ATTEND CHURCH OR RELIGIOUS SERVICES?: NEVER

## 2024-10-21 SDOH — ECONOMIC STABILITY: HOUSING INSECURITY: IN THE LAST 12 MONTHS, WAS THERE A TIME WHEN YOU WERE NOT ABLE TO PAY THE MORTGAGE OR RENT ON TIME?: NO

## 2024-10-21 SDOH — ECONOMIC STABILITY: FOOD INSECURITY: WITHIN THE PAST 12 MONTHS, YOU WORRIED THAT YOUR FOOD WOULD RUN OUT BEFORE YOU GOT THE MONEY TO BUY MORE.: NEVER TRUE

## 2024-10-21 SDOH — SOCIAL STABILITY: SOCIAL INSECURITY: WITHIN THE LAST YEAR, HAVE YOU BEEN HUMILIATED OR EMOTIONALLY ABUSED IN OTHER WAYS BY YOUR PARTNER OR EX-PARTNER?: NO

## 2024-10-21 SDOH — HEALTH STABILITY: PHYSICAL HEALTH: ON AVERAGE, HOW MANY DAYS PER WEEK DO YOU ENGAGE IN MODERATE TO STRENUOUS EXERCISE (LIKE A BRISK WALK)?: 0 DAYS

## 2024-10-21 SDOH — HEALTH STABILITY: PHYSICAL HEALTH
HOW OFTEN DO YOU NEED TO HAVE SOMEONE HELP YOU WHEN YOU READ INSTRUCTIONS, PAMPHLETS, OR OTHER WRITTEN MATERIAL FROM YOUR DOCTOR OR PHARMACY?: SOMETIMES

## 2024-10-21 SDOH — HEALTH STABILITY: MENTAL HEALTH
DO YOU FEEL STRESS - TENSE, RESTLESS, NERVOUS, OR ANXIOUS, OR UNABLE TO SLEEP AT NIGHT BECAUSE YOUR MIND IS TROUBLED ALL THE TIME - THESE DAYS?: ONLY A LITTLE

## 2024-10-21 SDOH — SOCIAL STABILITY: SOCIAL INSECURITY: WITHIN THE LAST YEAR, HAVE YOU BEEN AFRAID OF YOUR PARTNER OR EX-PARTNER?: NO

## 2024-10-21 SDOH — SOCIAL STABILITY: SOCIAL INSECURITY: ARE YOU MARRIED, WIDOWED, DIVORCED, SEPARATED, NEVER MARRIED, OR LIVING WITH A PARTNER?: WIDOWED

## 2024-10-21 SDOH — HEALTH STABILITY: MENTAL HEALTH: HOW OFTEN DO YOU HAVE SIX OR MORE DRINKS ON ONE OCCASION?: NEVER

## 2024-10-21 SDOH — HEALTH STABILITY: MENTAL HEALTH: HOW OFTEN DO YOU HAVE A DRINK CONTAINING ALCOHOL?: NEVER

## 2024-10-21 SDOH — ECONOMIC STABILITY: FOOD INSECURITY: HOW HARD IS IT FOR YOU TO PAY FOR THE VERY BASICS LIKE FOOD, HOUSING, MEDICAL CARE, AND HEATING?: NOT HARD AT ALL

## 2024-10-21 SDOH — SOCIAL STABILITY: SOCIAL NETWORK: HOW OFTEN DO YOU ATTEND MEETINGS OF THE CLUBS OR ORGANIZATIONS YOU BELONG TO?: NEVER

## 2024-10-21 SDOH — SOCIAL STABILITY: SOCIAL NETWORK: HOW OFTEN DO YOU GET TOGETHER WITH FRIENDS OR RELATIVES?: THREE TIMES A WEEK

## 2024-10-21 SDOH — HEALTH STABILITY: PHYSICAL HEALTH: ON AVERAGE, HOW MANY MINUTES DO YOU ENGAGE IN EXERCISE AT THIS LEVEL?: 0 MIN

## 2024-10-21 SDOH — HEALTH STABILITY: MENTAL HEALTH: HOW MANY DRINKS CONTAINING ALCOHOL DO YOU HAVE ON A TYPICAL DAY WHEN YOU ARE DRINKING?: PATIENT DOES NOT DRINK

## 2024-10-21 SDOH — ECONOMIC STABILITY: FOOD INSECURITY: WITHIN THE PAST 12 MONTHS, THE FOOD YOU BOUGHT JUST DIDN'T LAST AND YOU DIDN'T HAVE MONEY TO GET MORE.: NEVER TRUE

## 2024-10-21 SDOH — SOCIAL STABILITY: SOCIAL NETWORK
DO YOU BELONG TO ANY CLUBS OR ORGANIZATIONS SUCH AS CHURCH GROUPS, UNIONS, FRATERNAL OR ATHLETIC GROUPS, OR SCHOOL GROUPS?: NO

## 2024-10-21 SDOH — ECONOMIC STABILITY: TRANSPORTATION INSECURITY: IN THE PAST 12 MONTHS, HAS LACK OF TRANSPORTATION KEPT YOU FROM MEDICAL APPOINTMENTS OR FROM GETTING MEDICATIONS?: NO

## 2024-10-21 SDOH — ECONOMIC STABILITY: HOUSING INSECURITY: IN THE PAST 12 MONTHS, HOW MANY TIMES HAVE YOU MOVED WHERE YOU WERE LIVING?: 0

## 2024-10-21 SDOH — SOCIAL STABILITY: SOCIAL NETWORK
IN A TYPICAL WEEK, HOW MANY TIMES DO YOU TALK ON THE PHONE WITH FAMILY, FRIENDS, OR NEIGHBORS?: MORE THAN THREE TIMES A WEEK

## 2024-10-21 ASSESSMENT — ENCOUNTER SYMPTOMS
ROS GI COMMENTS: GERD
BACK PAIN: 1
CARDIOVASCULAR NEGATIVE: 1
EYES NEGATIVE: 1
CONSTIPATION: 0
ENDOCRINE NEGATIVE: 1
POLYDIPSIA: 0
HEMATOLOGIC/LYMPHATIC NEGATIVE: 1
ACTIVITY CHANGE: 1
CHILLS: 0
VOMITING: 1
CHEST TIGHTNESS: 1
EYE PAIN: 0
GASTROINTESTINAL NEGATIVE: 1
NAUSEA: 1
HEMATURIA: 0
SHORTNESS OF BREATH: 1
MUSCULOSKELETAL NEGATIVE: 1
POLYPHAGIA: 0
NEUROLOGICAL NEGATIVE: 1
WEAKNESS: 1
DIARRHEA: 0
MYALGIAS: 1
SEIZURES: 0
SLEEP DISTURBANCE: 0
FEVER: 0
DIFFICULTY URINATING: 0
NECK PAIN: 1
SINUS PAIN: 0
DIZZINESS: 1
APPETITE CHANGE: 1
NERVOUS/ANXIOUS: 0
RHINORRHEA: 1
BLOOD IN STOOL: 0
ABDOMINAL PAIN: 0
FACIAL SWELLING: 0
CHILLS: 1
ABDOMINAL DISTENTION: 0
PALPITATIONS: 0
WOUND: 0
SORE THROAT: 0
FEVER: 1
PSYCHIATRIC NEGATIVE: 1
WHEEZING: 0
UNEXPECTED WEIGHT CHANGE: 1
LIGHT-HEADEDNESS: 1
ABDOMINAL PAIN: 1
HEADACHES: 0
ALLERGIC/IMMUNOLOGIC NEGATIVE: 1
TREMORS: 0
COLOR CHANGE: 0
COUGH: 1
FATIGUE: 1
CONFUSION: 0
ARTHRALGIAS: 1

## 2024-10-21 ASSESSMENT — COGNITIVE AND FUNCTIONAL STATUS - GENERAL
MOVING TO AND FROM BED TO CHAIR: A LOT
MOVING TO AND FROM BED TO CHAIR: A LOT
CLIMB 3 TO 5 STEPS WITH RAILING: A LOT
DRESSING REGULAR LOWER BODY CLOTHING: A LITTLE
MOBILITY SCORE: 15
TURNING FROM BACK TO SIDE WHILE IN FLAT BAD: A LITTLE
TOILETING: A LITTLE
TOILETING: A LITTLE
HELP NEEDED FOR BATHING: A LITTLE
DRESSING REGULAR UPPER BODY CLOTHING: A LITTLE
DRESSING REGULAR LOWER BODY CLOTHING: A LITTLE
DAILY ACTIVITIY SCORE: 19
WALKING IN HOSPITAL ROOM: A LOT
PERSONAL GROOMING: A LITTLE
DAILY ACTIVITIY SCORE: 19
TURNING FROM BACK TO SIDE WHILE IN FLAT BAD: A LITTLE
CLIMB 3 TO 5 STEPS WITH RAILING: A LOT
WALKING IN HOSPITAL ROOM: A LOT
MOBILITY SCORE: 15
DRESSING REGULAR UPPER BODY CLOTHING: A LITTLE
HELP NEEDED FOR BATHING: A LITTLE
STANDING UP FROM CHAIR USING ARMS: A LOT
STANDING UP FROM CHAIR USING ARMS: A LOT
PERSONAL GROOMING: A LITTLE

## 2024-10-21 ASSESSMENT — PAIN - FUNCTIONAL ASSESSMENT
PAIN_FUNCTIONAL_ASSESSMENT: 0-10
PAIN_FUNCTIONAL_ASSESSMENT: FLACC (FACE, LEGS, ACTIVITY, CRY, CONSOLABILITY)
PAIN_FUNCTIONAL_ASSESSMENT: FLACC (FACE, LEGS, ACTIVITY, CRY, CONSOLABILITY)
PAIN_FUNCTIONAL_ASSESSMENT: 0-10

## 2024-10-21 ASSESSMENT — PAIN SCALES - GENERAL
PAINLEVEL_OUTOF10: 0 - NO PAIN
PAINLEVEL_OUTOF10: 2
PAINLEVEL_OUTOF10: 0 - NO PAIN

## 2024-10-21 ASSESSMENT — LIFESTYLE VARIABLES
AUDIT-C TOTAL SCORE: 0
SKIP TO QUESTIONS 9-10: 1

## 2024-10-21 ASSESSMENT — ACTIVITIES OF DAILY LIVING (ADL): LACK_OF_TRANSPORTATION: NO

## 2024-10-21 ASSESSMENT — PAIN DESCRIPTION - DESCRIPTORS: DESCRIPTORS: ACHING

## 2024-10-21 ASSESSMENT — PAIN SCALES - WONG BAKER: WONGBAKER_NUMERICALRESPONSE: NO HURT

## 2024-10-21 NOTE — ASSESSMENT & PLAN NOTE
Suspect due to volume depletion  Resolved    89F hx Afib not on AC, HTN, DM type 2, endocarditis treated in 2010, cervical ca s/p hysterectomy in 2000, prior PNA unable to be weaned off ventilator s/p trach that is now removed, transferred from OSH for chronic subdural hematoma evacuation

## 2024-10-21 NOTE — CONSULTS
Inpatient consult to gastroenterology  Consult performed by: Kathleen Navarro, APRN-CNP  Consult ordered by: Chet Beal MD          Reason For Consult  Anemia    History Of Present Illness  Dian Perez is a 79 y.o. female presenting with nausea, vomiting, weakness, shortness of breath, and generalized pain. Patient was admitted for shortness of breath, and intractable nausea & vomiting secondary to lung cancer with metastasis.  ER labs showed normocytic anemia with a drop in Hgb of 12.5 on admission to 9.8 on repeat. CT of the chest was obtained which reveals large lung mass with necrotic center, likely metastatic disease, as well as extension into the ventricle of the heart. Unclear if this is a mass or thrombus, with possibility of thrombus, heparin drip was started. She denies any melena, hematochezia, hematemesis, hemoptysis. She reports occasionally having loose stool, however states this has been ongoing for years.  She does state that she has had EGD/colonoscopy in the past and were unremarkable.  Unable to view any report or records. She denies any abdominal pain, nausea, vomiting. Denies taking any blood thinners. Was started on heparin in ED.     Past Medical History  She has a past medical history of Arthritis, Diabetes mellitus (Multi), Disease of thyroid gland, and Hypertension.    Surgical History  She has a past surgical history that includes Tonsillectomy.     Social History  She reports that she has been smoking cigarettes. She started smoking about 62 years ago. She has a 125.6 pack-year smoking history. She has never used smokeless tobacco. She reports that she does not drink alcohol and does not use drugs.    Family History  No family history on file.     Allergies  Fluorouracil-adhesive bandage and Statins-hmg-coa reductase inhibitors    Review of Systems   HENT: Negative.     Eyes: Negative.    Respiratory:  Positive for shortness of breath.    Cardiovascular: Negative.    Gastrointestinal:  "Negative.    Endocrine: Negative.    Genitourinary: Negative.    Musculoskeletal: Negative.    Skin: Negative.    Allergic/Immunologic: Negative.    Neurological: Negative.    Hematological: Negative.    Psychiatric/Behavioral: Negative.          Physical Exam  HENT:      Head: Normocephalic.      Nose: Nose normal.      Mouth/Throat:      Mouth: Mucous membranes are moist.   Eyes:      Pupils: Pupils are equal, round, and reactive to light.   Cardiovascular:      Rate and Rhythm: Normal rate.   Pulmonary:      Breath sounds: Normal breath sounds.   Abdominal:      Palpations: Abdomen is soft.   Musculoskeletal:         General: Normal range of motion.      Cervical back: Normal range of motion.   Skin:     General: Skin is warm.   Neurological:      General: No focal deficit present.      Mental Status: She is alert.   Psychiatric:         Mood and Affect: Mood normal.          Last Recorded Vitals  Blood pressure 116/67, pulse 73, temperature 36 °C (96.8 °F), temperature source Temporal, resp. rate 18, height 1.702 m (5' 7\"), weight 75.8 kg (167 lb 1.7 oz), SpO2 98%.    Relevant Results  CT chest abdomen pelvis w IV contrast    Result Date: 10/20/2024  Interpreted By:  Linda Stafford, STUDY: CT CHEST ABDOMEN PELVIS W IV CONTRAST;  10/20/2024 5:30 pm   INDICATION: Signs/Symptoms:n/v.   COMPARISON: None.   ACCESSION NUMBER(S): [id number]   ORDERING CLINICIAN: LUCY HOLLAND   TECHNIQUE: Axial CT images of the chest, abdomen and pelvis with coronal and sagittal reconstructed images obtained after intravenous administration of 75 mL of Omnipaque 350.   FINDINGS: CHEST:   VESSELS: Aorta is normal caliber. Atherosclerotic changes in the aorta and coronary arteries. HEART: Normal size. No pericardial effusion. There is a lobulated 2.8 x 3.6 x 2.8 cm mass in the left atrium which appears contiguous with large subcarinal hypoattenuating mass described below. MEDIASTINUM AND MONROE: Multiple enlarged mediastinal lymph nodes " are present, largest in the subcarinal region demonstrates central hypoattenuation measuring 3.6 x 5.3 cm concerning for central necrosis. There is loss of fat plane with the adjacent esophagus as well as left ventricle concerning for local invasion. Additional enlarged and prominent thoracic lymph nodes noted. LUNG, PLEURA, LARGE AIRWAYS: There is a multilobulated centrally hypointense mass in the right upper lobe and jac measuring approximately 8.6 x 12.3 x 8.3 cm. This demonstrates central foci of gas and hypoattenuation concerning for necrosis. There is marked narrowing of the right upper lobe pulmonary artery with occlusion of the right upper lobe pulmonary vein and bronchus. There is mass-effect and narrowing of the SVC. Findings are highly concerning for primary malignancy. Left lung is clear. No effusion or pneumothorax. CHEST WALL AND LOWER NECK: Subcentimeter hypodense nodule and coarse calcification in the right lobe of the thyroid gland is nonspecific. There is a heterogeneous enlarged left subpectoral mass measuring 3.8 x 3.8 cm. BONES: Generalized diffuse osteopenia. Multilevel degenerative changes of the spine. Bilateral shoulder osteoarthrosis.   ABDOMEN:   LIVER: Within normal limits. BILE DUCTS: Normal caliber. GALLBLADDER: Status post cholecystectomy. PANCREAS: Marked fatty atrophy of the pancreas. SPLEEN: Within normal limits. ADRENALS: Heterogeneous 3 cm left adrenal nodule concerning for metastases. Right adrenal glands within normal limits. KIDNEYS: Symmetric renal enhancement. No hydronephrosis or perinephric fluid collection. URETERS: No hydroureter.   VESSELS: Calcific atherosclerosis of the aortoiliac vessels. No aortic aneurysm. RETROPERITONEUM: Within normal limits.   PELVIS:   REPRODUCTIVE ORGANS: Anteverted uterus. The endometrial complex is abnormally thickened measuring up to 11 mm. No adnexal mass. BLADDER: Within normal limits.   BOWEL: Stomach is under distended. Visualized loops  of bowel are without evidence for obstruction. Appendix is not identified with certainty. No pericecal inflammatory changes seen. Mural stratification of the colon likely sequela of remote colitis. A few scattered colonic diverticula without evidence for acute diverticulitis. No pneumatosis or portal venous gas. PERITONEUM: No ascites or free air, no fluid collection.   ABDOMINAL WALL: Within normal limits. BONES: Generalized diffuse osteopenia. Multilevel degenerative changes of the spine. Mild S shaped scoliosis.       There is a large heterogeneous multilobulated mass in the right upper lobe and jac measuring approximately 8.6 x 12.3 x 8.3 cm. This demonstrates central foci of gas and hypoattenuation concerning for necrosis. There is marked narrowing of the right upper lobe pulmonary artery with occlusion of the right upper lobe pulmonary vein and bronchus. There is mass-effect and narrowing of the SVC. Findings are highly concerning for primary malignancy.   There are multiple enlarged mediastinal lymph nodes concerning for alton metastases. The largest in the subcarinal region demonstrates central hypoattenuation measuring 3.6 x 5.3 cm concerning for central necrosis. There is loss of fat plane with the adjacent esophagus and left ventricle with a lobulated 2.8 x 3.6 x 2.8 cm mass in the left atrium which is highly concerning for local invasion. Focal thrombus in the left ventricle not excluded. Further evaluation with echocardiogram is recommended.   There is a heterogeneous enlarged left subpectoral mass measuring 3.8 x 3.8 cm consistent with metastases.   Heterogeneous 3 cm left adrenal nodule is concerning for metastases.   The endometrial complex is abnormally thickened measuring up to 11 mm. Given patient's stated age differential considerations include hyperplasia versus neoplasia. Gynecological consultation further evaluation with nonemergent ultrasound is advised.   Diverticulosis without evidence for  acute diverticulitis. Mural stratification of the colon likely sequela of remote colitis. Correlate with symptomatology if there is concern for superimposed early colitis.   Additional findings as described above.   MACRO: Linda Stafford discussed the significance and urgency of this critical finding by telephone with  LUCY HOLLAND on 10/20/2024 at 6:36 pm. (**-RCF-**) Findings:  See findings.   Signed by: Linda Stafford 10/20/2024 6:44 PM Dictation workstation:   [id number]    XR chest 1 view    Result Date: 10/20/2024  Interpreted By:  Divina Garcia, STUDY: XR CHEST 1 VIEW; ;  10/20/2024 4:03 pm   INDICATION: Signs/Symptoms:weakness.     COMPARISON: 01/08/2008   ACCESSION NUMBER(S): [id number]   ORDERING CLINICIAN: LUCY HOLLAND   FINDINGS: There is a 9 x 9 cm mass like airspace opacity in the right upper lung zone silhouetting the right perihilar region. Left lung is relatively clear. No large pleural effusions within limits of portable technique. No large pneumothorax. No acute osseous findings.       9 x 9 cm masslike airspace opacity in the right upper lung zone is concerning neoplastic process. Recommend further evaluation with CT chest with contrast.     MACRO: Critical Finding:  See findings. Notification was initiated on 10/20/2024 at 4:40 pm by Dr. Divina Garcia.  (**-YCF-**) Instructions:   Signed by: Divina Garcia 10/20/2024 4:41 PM Dictation workstation:   [id number]      Scheduled medications  cefTRIAXone, 2 g, intravenous, q24h  famotidine, 20 mg, oral, BID  insulin lispro, 0-10 Units, subcutaneous, TID  levothyroxine, 150 mcg, oral, Daily  metoprolol succinate XL, 50 mg, oral, BID  polyethylene glycol, 17 g, oral, Daily  potassium chloride CR, 20 mEq, oral, Daily      Continuous medications  heparin, 0-4,500 Units/hr, Last Rate: 1,400 Units/hr (10/21/24 0518)      PRN medications  PRN medications: acetaminophen **OR** acetaminophen **OR** acetaminophen, dextrose, dextrose, glucagon,  glucagon, heparin (porcine), LORazepam, meclizine, melatonin  Results for orders placed or performed during the hospital encounter of 10/20/24 (from the past 24 hours)   CBC and Auto Differential   Result Value Ref Range    WBC 11.9 (H) 4.4 - 11.3 x10*3/uL    nRBC 0.0 0.0 - 0.0 /100 WBCs    RBC 4.17 4.00 - 5.20 x10*6/uL    Hemoglobin 12.5 12.0 - 16.0 g/dL    Hematocrit 37.4 36.0 - 46.0 %    MCV 90 80 - 100 fL    MCH 30.0 26.0 - 34.0 pg    MCHC 33.4 32.0 - 36.0 g/dL    RDW 14.5 11.5 - 14.5 %    Platelets 457 (H) 150 - 450 x10*3/uL    Neutrophils % 69.4 40.0 - 80.0 %    Immature Granulocytes %, Automated 0.5 0.0 - 0.9 %    Lymphocytes % 16.8 13.0 - 44.0 %    Monocytes % 11.4 2.0 - 10.0 %    Eosinophils % 0.8 0.0 - 6.0 %    Basophils % 1.1 0.0 - 2.0 %    Neutrophils Absolute 8.29 (H) 1.60 - 5.50 x10*3/uL    Immature Granulocytes Absolute, Automated 0.06 0.00 - 0.50 x10*3/uL    Lymphocytes Absolute 2.01 0.80 - 3.00 x10*3/uL    Monocytes Absolute 1.36 (H) 0.05 - 0.80 x10*3/uL    Eosinophils Absolute 0.09 0.00 - 0.40 x10*3/uL    Basophils Absolute 0.13 (H) 0.00 - 0.10 x10*3/uL   Comprehensive Metabolic Panel   Result Value Ref Range    Glucose 215 (H) 74 - 99 mg/dL    Sodium 131 (L) 136 - 145 mmol/L    Potassium 2.7 (LL) 3.5 - 5.3 mmol/L    Chloride 86 (L) 98 - 107 mmol/L    Bicarbonate 29 21 - 32 mmol/L    Anion Gap 19 10 - 20 mmol/L    Urea Nitrogen 7 6 - 23 mg/dL    Creatinine 0.74 0.50 - 1.05 mg/dL    eGFR 83 >60 mL/min/1.73m*2    Calcium 10.3 8.6 - 10.3 mg/dL    Albumin 3.3 (L) 3.4 - 5.0 g/dL    Alkaline Phosphatase 56 33 - 136 U/L    Total Protein 8.4 (H) 6.4 - 8.2 g/dL    AST 11 9 - 39 U/L    Bilirubin, Total 0.7 0.0 - 1.2 mg/dL    ALT 4 (L) 7 - 45 U/L   B-Type Natriuretic Peptide   Result Value Ref Range    BNP 45 0 - 99 pg/mL   Troponin I, High Sensitivity, Initial   Result Value Ref Range    Troponin I, High Sensitivity 15 (H) 0 - 13 ng/L   TSH with reflex to Free T4 if abnormal   Result Value Ref Range     Thyroid Stimulating Hormone 0.66 0.44 - 3.98 mIU/L   Magnesium   Result Value Ref Range    Magnesium 1.46 (L) 1.60 - 2.40 mg/dL   Sars-CoV-2 PCR   Result Value Ref Range    Coronavirus 2019, PCR Not Detected Not Detected   Influenza A, and B PCR   Result Value Ref Range    Flu A Result Not Detected Not Detected    Flu B Result Not Detected Not Detected   Lactate   Result Value Ref Range    Lactate 4.3 (HH) 0.4 - 2.0 mmol/L   Blood Culture    Specimen: Peripheral Venipuncture; Blood culture   Result Value Ref Range    Blood Culture Loaded on Instrument - Culture in progress    Blood Culture    Specimen: Peripheral Venipuncture; Blood culture   Result Value Ref Range    Blood Culture Loaded on Instrument - Culture in progress    Troponin, High Sensitivity, 1 Hour   Result Value Ref Range    Troponin I, High Sensitivity 15 (H) 0 - 13 ng/L   TSH with reflex to Free T4 if abnormal   Result Value Ref Range    Thyroid Stimulating Hormone 0.61 0.44 - 3.98 mIU/L   Lactate   Result Value Ref Range    Lactate 1.2 0.4 - 2.0 mmol/L   aPTT   Result Value Ref Range    aPTT 28.6 22.0 - 32.5 seconds   CBC   Result Value Ref Range    WBC 9.8 4.4 - 11.3 x10*3/uL    nRBC 0.0 0.0 - 0.0 /100 WBCs    RBC 3.29 (L) 4.00 - 5.20 x10*6/uL    Hemoglobin 9.8 (L) 12.0 - 16.0 g/dL    Hematocrit 30.5 (L) 36.0 - 46.0 %    MCV 93 80 - 100 fL    MCH 29.8 26.0 - 34.0 pg    MCHC 32.1 32.0 - 36.0 g/dL    RDW 14.6 (H) 11.5 - 14.5 %    Platelets 341 150 - 450 x10*3/uL   Urinalysis with Reflex Culture and Microscopic   Result Value Ref Range    Color, Urine Colorless (N) Light-Yellow, Yellow, Dark-Yellow    Appearance, Urine Clear Clear    Specific Gravity, Urine 1.029 1.005 - 1.035    pH, Urine 5.5 5.0, 5.5, 6.0, 6.5, 7.0, 7.5, 8.0    Protein, Urine NEGATIVE NEGATIVE, 10 (TRACE), 20 (TRACE) mg/dL    Glucose, Urine Normal Normal mg/dL    Blood, Urine NEGATIVE NEGATIVE    Ketones, Urine NEGATIVE NEGATIVE mg/dL    Bilirubin, Urine NEGATIVE NEGATIVE     Urobilinogen, Urine Normal Normal mg/dL    Nitrite, Urine 2+ (A) NEGATIVE    Leukocyte Esterase, Urine NEGATIVE NEGATIVE   Microscopic Only, Urine   Result Value Ref Range    WBC, Urine NONE 1-5, NONE /HPF    RBC, Urine 1-2 NONE, 1-2, 3-5 /HPF    Squamous Epithelial Cells, Urine 1-9 (SPARSE) Reference range not established. /HPF   Hemoglobin and hematocrit, blood   Result Value Ref Range    Hemoglobin 10.4 (L) 12.0 - 16.0 g/dL    Hematocrit 32.2 (L) 36.0 - 46.0 %        Assessment/Plan   Anemia (Normocytic anemia)  Unclear etiology, possibly cancer related with mets. No overt bleeding noted. Repeat Hgb this am was 10.4.  No urgent indication for scopes at this time. Can consider if patient develops any overt bleeding.  Recommend EGD/colonoscopy as outpatient as unable to obtain any previous records.   - Occult stool negative   - H/H stable   - Monitor  CBC, CMP, INR,  - Transfuse to keep hgb >7  - Protonix 40 mg IV BID  - Hematology consulted for suspected primary lung cancer with metastasis to mediastinum, lymph node pectoral muscle, SVC narrowing and adrenal gland.  Appreciate recommendations        Kathleen Navarro, APRN-CNP

## 2024-10-21 NOTE — NURSING NOTE
Assumed care of patient alongside LPN.    Patient awakens easily.  Resting quietly in bed.  No visible distress observed.  Respirations even and unlabored on RA.  Moist non productive cough overheard.  Denies chest pain/SOB.  Call light and commonly used items in reach.  Bed locked and in low position.

## 2024-10-21 NOTE — ASSESSMENT & PLAN NOTE
Suspected primary lung cancer with metastasis to mediastinum, lymph node pectoral muscle, SVC narrowing and adrenal gland.  Management per hematology oncology  Palliative medicine consulted for goals of care

## 2024-10-21 NOTE — ASSESSMENT & PLAN NOTE
Secondary to volume depletion from nausea vomiting, resolved with IV fluid hydration.  Patient historically on Toprol XL 50 mg twice daily  Resolved with IV fluids

## 2024-10-21 NOTE — ASSESSMENT & PLAN NOTE
CT imaging showing thrombus in the left atrium and left ventricle  Continue heparin drip  Echo ordered  Cardiology consulted  Started on coumadin by cardiology - will bridge with heparin and stop heparin gtt when INR is therapeutic

## 2024-10-21 NOTE — PROGRESS NOTES
Occupational Therapy                 Therapy Communication Note    Patient Name: Dian Perez  MRN: [id number]  Department: [facility] 3 E  Room: 03/03-A  Today's Date: 10/21/2024     Discipline: Occupational Therapy    Missed Visit Reason: Missed Visit Reason: Other (Comment) (OT eval received and chart reviewed. Pt with .8, not medically appropriate for therapy at this time.)    Missed Time: Cancel

## 2024-10-21 NOTE — CONSULTS
"Inpatient consult to Cardiology  Consult performed by: Konrad Baker MD  Consult ordered by: Chet Beal MD        History Of Present Illness:    Dian Perez is a 79 y.o. female patient with a known history of weight loss almost 50 pounds in last year.  History of multilobulated mass in right upper lobe and lungs.  Patient came to [facility] emergency room with episode nausea vomiting short of breath weakness fatigue.  Poor historian.  Patient found having a hypokalemia.  Patient was dizzy lightheaded.  And and slumped off the toilet.  Patient had a CAT scan done found to having a possible left ventricular thrombus.  Patient currently on a heparin protocol.  No active angina CHF sign symptoms.  Monitor patient lying sinus rhythm.  Last Recorded Vitals:  Vitals:    10/20/24 2134 10/21/24 0319 10/21/24 0803 10/21/24 1135   BP: 116/67  124/63 114/61   BP Location:   Left arm Left arm   Patient Position:   Lying Lying   Pulse: 73      Resp: 18  18 16   Temp: 36 °C (96.8 °F)  36.2 °C (97.2 °F) 36.3 °C (97.3 °F)   TempSrc: Temporal  Temporal Temporal   SpO2: 98%  96% 93%   Weight: 74.9 kg (165 lb 2 oz) 75.8 kg (167 lb 1.7 oz)     Height: 1.702 m (5' 7\")          Past Medical History:  She has a past medical history of Arthritis, Diabetes mellitus (Multi), Disease of thyroid gland, and Hypertension.    Past Surgical History:  She has a past surgical history that includes Tonsillectomy.      Social History:  She reports that she has been smoking cigarettes. She started smoking about 62 years ago. She has a 125.6 pack-year smoking history. She has never used smokeless tobacco. She reports that she does not drink alcohol and does not use drugs.    Family History:  No family history on file.     Allergies:  Fluorouracil-adhesive bandage and Statins-hmg-coa reductase inhibitors      ROS: See HPI  CONSTITUTIONAL: Chills- none. Fever- none. Weight weight loss almost 40 to 50 pounds in last 6 months to year.  HEENT: Headache- " Negative.  Change in vision- none.  Ear pain- none. Nasal congestion- none. Post-nasal drip-none.  Sore throat-none.  CARDIOLOGY: Chest pain- none.  Leg edema- none.  Murmurs-none.  Palpitation- none.  RESPIRATORY: Episodes of on and off shortness of breath.  GI: Abdominal pain- none.  Change in bowel habits- none.  Constipation- none.  Diarrhea- none.  Nausea- none.  Vomiting- none.  MUSCULOSKELETAL: Joint pain- none.  Muscle aches- none.  DERMATOLOGY: Rash- none.  NEUROLOGY: Dizziness-patient with episodes of lightheaded dizziness upon postural changes..   Headache- none.  PSYCHIATRY: Denies any depression or anxiety.    Inpatient Medications:  Scheduled medications   Medication Dose Route Frequency    cefTRIAXone  2 g intravenous q24h    famotidine  20 mg oral BID    insulin lispro  0-10 Units subcutaneous TID    levothyroxine  150 mcg oral Daily    metoprolol succinate XL  50 mg oral BID    polyethylene glycol  17 g oral Daily    potassium chloride CR  20 mEq oral Daily     Outpatient Medications:  Current Outpatient Medications   Medication Instructions    glimepiride (Amaryl) 4 mg tablet 1 tablet, 2 times daily (morning and late afternoon)    hydroCHLOROthiazide (HYDRODIURIL) 25 mg, Daily RT    loperamide (IMODIUM) 2 mg, Every 6 hours PRN    meclizine (ANTIVERT) 25 mg, 3 times daily PRN    potassium chloride CR 10 mEq ER tablet 20 mEq, Daily RT    RABEprazole (Aciphex) EC tablet 1 tablet, Daily (0630)    Synthroid 150 mcg, Daily RT    Toprol XL 50 mg, 2 times daily       Physical Exam:  HEENT: Normocephalic/atraumatic pupils equal react light  Neck exam mild JVD, no bruit  Lung exam diffuse rhonchi's, few crackles at the bases  Cardiac exam is regular rhythm S1-S2, soft systolic murmur heard.   Abdomen soft nontender, nondistended  Extremities no clubbing, cyanosis but trace edema  Neuro exam grossly intact.  Last Labs:  CBC - 10/21/2024:  7:40 AM  9.7 10.4 357    31.8      CMP - 10/21/2024:  6:32 AM  9.0 8.4  11 --- 0.7   _ 3.3 4 56      PTT - 10/21/2024:  6:32 AM  _   _ 137.8     Troponin I, High Sensitivity   Date/Time Value Ref Range Status   10/20/2024 05:27 PM 15 (H) 0 - 13 ng/L Final   10/20/2024 03:41 PM 15 (H) 0 - 13 ng/L Final     BNP   Date/Time Value Ref Range Status   10/20/2024 03:41 PM 45 0 - 99 pg/mL Final          ECG 12 Lead  Sinus tachycardia with occasional Premature ventricular complexes  Nonspecific T wave abnormality  Prolonged QT  Abnormal ECG  No previous ECGs available      Principal Problem:    Nausea and vomiting, unspecified vomiting type  Active Problems:    Tobacco use    Mass of upper lobe of right lung    Hilar mass    Metastatic disease (Multi)    Endometrial hyperplasia    Hypokalemia    Hyponatremia    Hypomagnesemia    Lactic acidosis    Elevated troponin    Type 2 diabetes mellitus    Acute anemia    Prolonged Q-T interval on ECG    Sinus tachycardia    Adrenal nodule    Left atrial mass    Hypothyroidism    UTI (urinary tract infection)    Assessment/Plan   79-year-old patient with a history of metastatic right upper lobe cancer.  Now with dizziness lightheadedness with weight loss.  Questionable thrombus in left ventricle.  Pending echocardiogram.  No active chest pain tightness.  Patient currently on a heparin protocol.  1.  History of upper lobe right lung mass: Patient with as above metastatic lung cancer.  Continue current monitoring.  History of smoking.  2.  Diabetes: Optimize glycemic control.  3.  Cardiac thrombus: Will check 2D echocardiogram also continue heparin.  Eventually may need to switch to Eliquis.  4.  Dizziness/lightheadedness: More likely due to underlying weight loss as well as dehydration.  5.  Hypokalemia: Replace electrolytes.    DVT, aspiration fall precaution taken.  Critical care time is spent at bedside includes review of diagnostic tests, labs, and radiographs, serial assessments and management of hemodynamics, EKGs, old echoes, cardiac work-up and  coordination of care.  Assessment, impression and plans are reflected in the note above as well as the orders.    Code Status:  DNR and No Intubation and No ICU  I spent 60 minutes in the professional and overall care of this patient.  Konrad Baker MD

## 2024-10-21 NOTE — ASSESSMENT & PLAN NOTE
Incidental finding  Follow up oncology and palliative care evaluations  Consider nephrology referral at discharge for further workup

## 2024-10-21 NOTE — ASSESSMENT & PLAN NOTE
CT imaging showing thrombus in the left atrium and left ventricle, continue heparin drip, echocardiogram ordered.  Cardiology consulted

## 2024-10-21 NOTE — PROGRESS NOTES
10/21/24 1105   Discharge Planning   Living Arrangements Alone   Support Systems Children;Family members   Assistance Needed SNF   Type of Residence Private residence   Do you have animals or pets at home? No   Who is requesting discharge planning? Provider   Home or Post Acute Services Post acute facilities (Rehab/SNF/etc)   Type of Post Acute Facility Services Skilled nursing   Expected Discharge Disposition SNF   Does the patient need discharge transport arranged? Yes   RoundTrip coordination needed? Yes   Has discharge transport been arranged? No   Patient Choice   Provider Choice list and CMS website (https://medicare.gov/care-compare#search) for post-acute Quality and Resource Measure Data were provided and reviewed with: Patient   Patient / Family choosing to utilize agency / facility established prior to hospitalization No

## 2024-10-21 NOTE — CARE PLAN
The patient's goals for the shift include  rest and control N/V    The clinical goals for the shift include metabolic stability

## 2024-10-21 NOTE — DISCHARGE INSTR - DIET
RD Recommendations for Malnutrition: Recommend patient consumes a high calorie/high protein nutrition supplement 3 times daily to aid in weight gain/prevent weight loss after discharge.

## 2024-10-21 NOTE — ASSESSMENT & PLAN NOTE
With metastatic disease.  Greater than 120-pack-year smoking history.  CT imaging with large multilobulated mass in the right upper lobe and jac with necrosis, enlarged mediastinal lymph nodes from metastasis, with suspected mass in the left atrium and ventricle, as well as left subpectoral mass and possible SVC involvement with narrowing, and suspected metastasis to left adrenal nodule.  Consulting hematology oncology  Consulting pulmonary medicine   Recommending smoking cessation

## 2024-10-21 NOTE — PROGRESS NOTES
10/21/24 1101   Physical Activity   On average, how many days per week do you engage in moderate to strenuous exercise (like a brisk walk)? 0 days   On average, how many minutes do you engage in exercise at this level? 0 min   Financial Resource Strain   How hard is it for you to pay for the very basics like food, housing, medical care, and heating? Not hard   Housing Stability   In the last 12 months, was there a time when you were not able to pay the mortgage or rent on time? N   In the past 12 months, how many times have you moved where you were living? 0   At any time in the past 12 months, were you homeless or living in a shelter (including now)? N   Transportation Needs   In the past 12 months, has lack of transportation kept you from medical appointments or from getting medications? no   In the past 12 months, has lack of transportation kept you from meetings, work, or from getting things needed for daily living? No   Food Insecurity   Within the past 12 months, you worried that your food would run out before you got the money to buy more. Never true   Within the past 12 months, the food you bought just didn't last and you didn't have money to get more. Never true   Stress   Do you feel stress - tense, restless, nervous, or anxious, or unable to sleep at night because your mind is troubled all the time - these days? Only a littl   Social Connections   In a typical week, how many times do you talk on the phone with family, friends, or neighbors? More than 3   How often do you get together with friends or relatives? Three times   How often do you attend Adventism or Orthodox services? Never   Do you belong to any clubs or organizations such as Adventism groups, unions, fraternal or athletic groups, or school groups? No   How often do you attend meetings of the clubs or organizations you belong to? Never   Are you , , , , never , or living with a partner?    Intimate  Partner Violence   Within the last year, have you been afraid of your partner or ex-partner? No   Within the last year, have you been humiliated or emotionally abused in other ways by your partner or ex-partner? No   Within the last year, have you been kicked, hit, slapped, or otherwise physically hurt by your partner or ex-partner? No   Within the last year, have you been raped or forced to have any kind of sexual activity by your partner or ex-partner? No   Alcohol Use   Q1: How often do you have a drink containing alcohol? Never   Q2: How many drinks containing alcohol do you have on a typical day when you are drinking? None   Q3: How often do you have six or more drinks on one occasion? Never   Utilities   In the past 12 months has the electric, gas, oil, or water company threatened to shut off services in your home? No   Health Literacy   How often do you need to have someone help you when you read instructions, pamphlets, or other written material from your doctor or pharmacy? Sometimes

## 2024-10-21 NOTE — ASSESSMENT & PLAN NOTE
Asymptomatic hyponatremia likely secondary to lung malignancy, Discontinued hydrochlorothiazide.     Normal for race

## 2024-10-21 NOTE — H&P
History Of Present Illness  Dian Perez is a 79 y.o. female presenting with nausea, vomiting, shortness of breath, myalgias and weakness.  Poor historian.  Some history taken from daughter who does not live with her.  It was reported this morning she was so weak she fell off the toilet did not have a head strike, due to feeling lightheaded and dizzy.  She reports feeling feverish with chills, was given Tylenol in the ED.  She has had a lingering none bloody worsening cough for a week, also nonbloody nonbilious vomiting and dry heaving for a couple days.  Greater than 120 pack year smoking history.  Patient denies chest pain, abdominal pain, numbness or tingling, hemoptysis, hematochezia, or dysuria.    In the ED vitals notable for , RR 21, BP 97/67.  Labs notable for glucose 215, sodium 131, K2.7, CL 86, magnesium 1.46, lactic acid 4.3->1.2, hemoglobin 12.5->9.8, UA 2+ nitrite, troponin 15-> 15.  TSH normal flu A/B negative.    CT chest abdomen pelvis:  - Large heterogeneous multilobulated mass in the right upper lobe and jac measuring 8.6 x 12.3 x 8.3 cm with concerns of necrosis.   -Multiple enlarged mediastinal lymph nodes concerning for alton metastasis with concerns for central necrosis  -2.8 x 3.6 x 2.8 cm mass in the left atrium concerning for local invasion.  Focal thrombus in the left ventricle not excluded  -Enlarged left subpectoral mass measuring 3.8 x 3.8 cm consistent with metastasis  -Mass effect and narrowing of SVC  -Thickened endometrial complex measuring up to 11 mm suspicious for hyperplasia versus neoplasia.  -Possible superimposed early colitis  -Incidental heterogeneous 3 cm left adrenal nodule concerning for metastasis    Patient admitted for shortness of breath, and intractable nausea & vomiting secondary to lung cancer with metastasis      Past Medical History  Past Medical History:   Diagnosis Date    Arthritis     Diabetes mellitus (Multi)     Disease of thyroid gland      Hypertension        Surgical History  Past Surgical History:   Procedure Laterality Date    TONSILLECTOMY          Social History  She reports that she has been smoking cigarettes. She started smoking about 62 years ago. She has a 125.6 pack-year smoking history. She has never used smokeless tobacco. She reports that she does not drink alcohol and does not use drugs.    Family History  No family history on file.     Allergies  Fluorouracil-adhesive bandage and Statins-hmg-coa reductase inhibitors    Review of Systems   Constitutional:  Positive for chills and fever.   HENT:  Negative for sore throat.    Respiratory:  Positive for cough, chest tightness and shortness of breath. Negative for wheezing.    Cardiovascular:  Negative for chest pain, palpitations and leg swelling.   Gastrointestinal:  Positive for abdominal pain, nausea and vomiting. Negative for blood in stool and diarrhea.   Genitourinary:  Negative for hematuria.   Musculoskeletal:  Positive for myalgias.   Neurological:  Positive for dizziness, weakness and light-headedness. Negative for seizures and syncope.        Physical Exam  Constitutional:       General: She is not in acute distress.     Appearance: She is ill-appearing. She is not toxic-appearing or diaphoretic.   HENT:      Mouth/Throat:      Mouth: Mucous membranes are moist.   Eyes:      Extraocular Movements: Extraocular movements intact.   Cardiovascular:      Rate and Rhythm: Normal rate.      Pulses: Normal pulses.      Heart sounds: No murmur heard.     No gallop.   Pulmonary:      Effort: Pulmonary effort is normal. No respiratory distress.      Breath sounds: No wheezing, rhonchi or rales.      Comments: Decreased bilateral breath sounds  Abdominal:      General: Abdomen is flat. There is no distension.      Palpations: Abdomen is soft.      Tenderness: There is no abdominal tenderness. There is no guarding.   Musculoskeletal:      Cervical back: Normal range of motion and neck  "supple.      Right lower leg: No edema.      Left lower leg: No edema.   Skin:     General: Skin is warm and dry.   Neurological:      Mental Status: She is alert and oriented to person, place, and time. Mental status is at baseline.      Motor: No weakness.   Psychiatric:         Mood and Affect: Mood normal.         Behavior: Behavior normal.          Last Recorded Vitals  Blood pressure 116/67, pulse 73, temperature 36 °C (96.8 °F), temperature source Temporal, resp. rate 18, height 1.702 m (5' 7\"), weight 74.9 kg (165 lb 2 oz), SpO2 98%.    Relevant Results  Scheduled medications  famotidine, 20 mg, oral, BID  levothyroxine, 150 mcg, oral, Daily  magnesium sulfate, 2 g, intravenous, Once  metoprolol succinate XL, 50 mg, oral, BID  polyethylene glycol, 17 g, oral, Daily  potassium chloride CR, 20 mEq, oral, Daily      Continuous medications  heparin, 0-4,500 Units/hr, Last Rate: 1,400 Units/hr (10/21/24 0024)      PRN medications  PRN medications: acetaminophen **OR** acetaminophen **OR** acetaminophen, heparin (porcine), LORazepam, meclizine, melatonin  Results for orders placed or performed during the hospital encounter of 10/20/24 (from the past 24 hours)   CBC and Auto Differential   Result Value Ref Range    WBC 11.9 (H) 4.4 - 11.3 x10*3/uL    nRBC 0.0 0.0 - 0.0 /100 WBCs    RBC 4.17 4.00 - 5.20 x10*6/uL    Hemoglobin 12.5 12.0 - 16.0 g/dL    Hematocrit 37.4 36.0 - 46.0 %    MCV 90 80 - 100 fL    MCH 30.0 26.0 - 34.0 pg    MCHC 33.4 32.0 - 36.0 g/dL    RDW 14.5 11.5 - 14.5 %    Platelets 457 (H) 150 - 450 x10*3/uL    Neutrophils % 69.4 40.0 - 80.0 %    Immature Granulocytes %, Automated 0.5 0.0 - 0.9 %    Lymphocytes % 16.8 13.0 - 44.0 %    Monocytes % 11.4 2.0 - 10.0 %    Eosinophils % 0.8 0.0 - 6.0 %    Basophils % 1.1 0.0 - 2.0 %    Neutrophils Absolute 8.29 (H) 1.60 - 5.50 x10*3/uL    Immature Granulocytes Absolute, Automated 0.06 0.00 - 0.50 x10*3/uL    Lymphocytes Absolute 2.01 0.80 - 3.00 x10*3/uL    " Monocytes Absolute 1.36 (H) 0.05 - 0.80 x10*3/uL    Eosinophils Absolute 0.09 0.00 - 0.40 x10*3/uL    Basophils Absolute 0.13 (H) 0.00 - 0.10 x10*3/uL   Comprehensive Metabolic Panel   Result Value Ref Range    Glucose 215 (H) 74 - 99 mg/dL    Sodium 131 (L) 136 - 145 mmol/L    Potassium 2.7 (LL) 3.5 - 5.3 mmol/L    Chloride 86 (L) 98 - 107 mmol/L    Bicarbonate 29 21 - 32 mmol/L    Anion Gap 19 10 - 20 mmol/L    Urea Nitrogen 7 6 - 23 mg/dL    Creatinine 0.74 0.50 - 1.05 mg/dL    eGFR 83 >60 mL/min/1.73m*2    Calcium 10.3 8.6 - 10.3 mg/dL    Albumin 3.3 (L) 3.4 - 5.0 g/dL    Alkaline Phosphatase 56 33 - 136 U/L    Total Protein 8.4 (H) 6.4 - 8.2 g/dL    AST 11 9 - 39 U/L    Bilirubin, Total 0.7 0.0 - 1.2 mg/dL    ALT 4 (L) 7 - 45 U/L   B-Type Natriuretic Peptide   Result Value Ref Range    BNP 45 0 - 99 pg/mL   Troponin I, High Sensitivity, Initial   Result Value Ref Range    Troponin I, High Sensitivity 15 (H) 0 - 13 ng/L   TSH with reflex to Free T4 if abnormal   Result Value Ref Range    Thyroid Stimulating Hormone 0.66 0.44 - 3.98 mIU/L   Magnesium   Result Value Ref Range    Magnesium 1.46 (L) 1.60 - 2.40 mg/dL   Sars-CoV-2 PCR   Result Value Ref Range    Coronavirus 2019, PCR Not Detected Not Detected   Influenza A, and B PCR   Result Value Ref Range    Flu A Result Not Detected Not Detected    Flu B Result Not Detected Not Detected   Lactate   Result Value Ref Range    Lactate 4.3 (HH) 0.4 - 2.0 mmol/L   Troponin, High Sensitivity, 1 Hour   Result Value Ref Range    Troponin I, High Sensitivity 15 (H) 0 - 13 ng/L   TSH with reflex to Free T4 if abnormal   Result Value Ref Range    Thyroid Stimulating Hormone 0.61 0.44 - 3.98 mIU/L   Lactate   Result Value Ref Range    Lactate 1.2 0.4 - 2.0 mmol/L   aPTT   Result Value Ref Range    aPTT 28.6 22.0 - 32.5 seconds   CBC   Result Value Ref Range    WBC 9.8 4.4 - 11.3 x10*3/uL    nRBC 0.0 0.0 - 0.0 /100 WBCs    RBC 3.29 (L) 4.00 - 5.20 x10*6/uL    Hemoglobin 9.8  (L) 12.0 - 16.0 g/dL    Hematocrit 30.5 (L) 36.0 - 46.0 %    MCV 93 80 - 100 fL    MCH 29.8 26.0 - 34.0 pg    MCHC 32.1 32.0 - 36.0 g/dL    RDW 14.6 (H) 11.5 - 14.5 %    Platelets 341 150 - 450 x10*3/uL   Urinalysis with Reflex Culture and Microscopic   Result Value Ref Range    Color, Urine Colorless (N) Light-Yellow, Yellow, Dark-Yellow    Appearance, Urine Clear Clear    Specific Gravity, Urine 1.029 1.005 - 1.035    pH, Urine 5.5 5.0, 5.5, 6.0, 6.5, 7.0, 7.5, 8.0    Protein, Urine NEGATIVE NEGATIVE, 10 (TRACE), 20 (TRACE) mg/dL    Glucose, Urine Normal Normal mg/dL    Blood, Urine NEGATIVE NEGATIVE    Ketones, Urine NEGATIVE NEGATIVE mg/dL    Bilirubin, Urine NEGATIVE NEGATIVE    Urobilinogen, Urine Normal Normal mg/dL    Nitrite, Urine 2+ (A) NEGATIVE    Leukocyte Esterase, Urine NEGATIVE NEGATIVE   Microscopic Only, Urine   Result Value Ref Range    WBC, Urine NONE 1-5, NONE /HPF    RBC, Urine 1-2 NONE, 1-2, 3-5 /HPF    Squamous Epithelial Cells, Urine 1-9 (SPARSE) Reference range not established. /HPF     CT chest abdomen pelvis w IV contrast    Result Date: 10/20/2024  Interpreted By:  Linda Stafford, STUDY: CT CHEST ABDOMEN PELVIS W IV CONTRAST;  10/20/2024 5:30 pm   INDICATION: Signs/Symptoms:n/v.   COMPARISON: None.   ACCESSION NUMBER(S): [id number]   ORDERING CLINICIAN: LUCY HOLLAND   TECHNIQUE: Axial CT images of the chest, abdomen and pelvis with coronal and sagittal reconstructed images obtained after intravenous administration of 75 mL of Omnipaque 350.   FINDINGS: CHEST:   VESSELS: Aorta is normal caliber. Atherosclerotic changes in the aorta and coronary arteries. HEART: Normal size. No pericardial effusion. There is a lobulated 2.8 x 3.6 x 2.8 cm mass in the left atrium which appears contiguous with large subcarinal hypoattenuating mass described below. MEDIASTINUM AND MONROE: Multiple enlarged mediastinal lymph nodes are present, largest in the subcarinal region demonstrates central  hypoattenuation measuring 3.6 x 5.3 cm concerning for central necrosis. There is loss of fat plane with the adjacent esophagus as well as left ventricle concerning for local invasion. Additional enlarged and prominent thoracic lymph nodes noted. LUNG, PLEURA, LARGE AIRWAYS: There is a multilobulated centrally hypointense mass in the right upper lobe and jac measuring approximately 8.6 x 12.3 x 8.3 cm. This demonstrates central foci of gas and hypoattenuation concerning for necrosis. There is marked narrowing of the right upper lobe pulmonary artery with occlusion of the right upper lobe pulmonary vein and bronchus. There is mass-effect and narrowing of the SVC. Findings are highly concerning for primary malignancy. Left lung is clear. No effusion or pneumothorax. CHEST WALL AND LOWER NECK: Subcentimeter hypodense nodule and coarse calcification in the right lobe of the thyroid gland is nonspecific. There is a heterogeneous enlarged left subpectoral mass measuring 3.8 x 3.8 cm. BONES: Generalized diffuse osteopenia. Multilevel degenerative changes of the spine. Bilateral shoulder osteoarthrosis.   ABDOMEN:   LIVER: Within normal limits. BILE DUCTS: Normal caliber. GALLBLADDER: Status post cholecystectomy. PANCREAS: Marked fatty atrophy of the pancreas. SPLEEN: Within normal limits. ADRENALS: Heterogeneous 3 cm left adrenal nodule concerning for metastases. Right adrenal glands within normal limits. KIDNEYS: Symmetric renal enhancement. No hydronephrosis or perinephric fluid collection. URETERS: No hydroureter.   VESSELS: Calcific atherosclerosis of the aortoiliac vessels. No aortic aneurysm. RETROPERITONEUM: Within normal limits.   PELVIS:   REPRODUCTIVE ORGANS: Anteverted uterus. The endometrial complex is abnormally thickened measuring up to 11 mm. No adnexal mass. BLADDER: Within normal limits.   BOWEL: Stomach is under distended. Visualized loops of bowel are without evidence for obstruction. Appendix is not  identified with certainty. No pericecal inflammatory changes seen. Mural stratification of the colon likely sequela of remote colitis. A few scattered colonic diverticula without evidence for acute diverticulitis. No pneumatosis or portal venous gas. PERITONEUM: No ascites or free air, no fluid collection.   ABDOMINAL WALL: Within normal limits. BONES: Generalized diffuse osteopenia. Multilevel degenerative changes of the spine. Mild S shaped scoliosis.       There is a large heterogeneous multilobulated mass in the right upper lobe and jac measuring approximately 8.6 x 12.3 x 8.3 cm. This demonstrates central foci of gas and hypoattenuation concerning for necrosis. There is marked narrowing of the right upper lobe pulmonary artery with occlusion of the right upper lobe pulmonary vein and bronchus. There is mass-effect and narrowing of the SVC. Findings are highly concerning for primary malignancy.   There are multiple enlarged mediastinal lymph nodes concerning for alton metastases. The largest in the subcarinal region demonstrates central hypoattenuation measuring 3.6 x 5.3 cm concerning for central necrosis. There is loss of fat plane with the adjacent esophagus and left ventricle with a lobulated 2.8 x 3.6 x 2.8 cm mass in the left atrium which is highly concerning for local invasion. Focal thrombus in the left ventricle not excluded. Further evaluation with echocardiogram is recommended.   There is a heterogeneous enlarged left subpectoral mass measuring 3.8 x 3.8 cm consistent with metastases.   Heterogeneous 3 cm left adrenal nodule is concerning for metastases.   The endometrial complex is abnormally thickened measuring up to 11 mm. Given patient's stated age differential considerations include hyperplasia versus neoplasia. Gynecological consultation further evaluation with nonemergent ultrasound is advised.   Diverticulosis without evidence for acute diverticulitis. Mural stratification of the colon likely  sequela of remote colitis. Correlate with symptomatology if there is concern for superimposed early colitis.   Additional findings as described above.   MACRO: Linda Stafford discussed the significance and urgency of this critical finding by telephone with  LUCY HOLLAND on 10/20/2024 at 6:36 pm. (**-RCF-**) Findings:  See findings.   Signed by: Linda Stafford 10/20/2024 6:44 PM Dictation workstation:   [id number]    XR chest 1 view    Result Date: 10/20/2024  Interpreted By:  Divina Garcia, STUDY: XR CHEST 1 VIEW; ;  10/20/2024 4:03 pm   INDICATION: Signs/Symptoms:weakness.     COMPARISON: 01/08/2008   ACCESSION NUMBER(S): [id number]   ORDERING CLINICIAN: LUCY HOLLAND   FINDINGS: There is a 9 x 9 cm mass like airspace opacity in the right upper lung zone silhouetting the right perihilar region. Left lung is relatively clear. No large pleural effusions within limits of portable technique. No large pneumothorax. No acute osseous findings.       9 x 9 cm masslike airspace opacity in the right upper lung zone is concerning neoplastic process. Recommend further evaluation with CT chest with contrast.     MACRO: Critical Finding:  See findings. Notification was initiated on 10/20/2024 at 4:40 pm by Dr. Divina Garcia.  (**-YCF-**) Instructions:   Signed by: Divina Garcia 10/20/2024 4:41 PM Dictation workstation:   [id number]        Assessment/Plan   Assessment & Plan  Nausea and vomiting, unspecified vomiting type  Likely secondary to malignancy, received Compazine in the ED.  I would refrain from giving any more antinausea medication due to prolonged Qtc 616  Ativan for nausea, Pepcid for GI prophylaxis  Monitor electrolytes  Deferring further nausea management to hematology oncology    Mass of upper lobe of right lung  With metastatic disease.  Greater than 120-pack-year smoking history.  CT imaging with large multilobulated mass in the right upper lobe and jac with necrosis, enlarged mediastinal lymph nodes  from metastasis, with suspected mass in the left atrium and ventricle, as well as left subpectoral mass and possible SVC involvement with narrowing, and suspected metastasis to left adrenal nodule.  Consulting hematology oncology  Consulting pulmonary medicine   Recommending smoking cessation    Metastatic disease (Multi)  Suspected primary lung cancer with metastasis to mediastinum, lymph node pectoral muscle, SVC narrowing and adrenal gland.  Management per hematology oncology  Palliative medicine consulted for goals of care    Left atrial mass  CT imaging showing thrombus in the left atrium and left ventricle, continue heparin drip, echocardiogram ordered.  Cardiology consulted    Prolonged Q-T interval on ECG  QTc 616 on EKG, avoid QT prolonging medication.  Ativan for nausea, deferring management to heme-onc    Type 2 diabetes mellitus  A1c pending  Held glimepiride  Started insulin sliding scale  Accu-Cheks    Hypokalemia  Likely secondary to vomiting, K 2.7 on admission, received 40 mEq plus IV fluid with potassium in the ED  Continue to monitor and replace    Acute anemia  Hemoglobin dropped from 12.5 to 9.8 in 15 hours  Hemoccult pending,   monitor H&H  GI consulted    Hypomagnesemia  Hypomagnesemia on admission, replaced with 2 g IV mag.  Monitor and replace    Hyponatremia  Asymptomatic hyponatremia likely secondary to lung malignancy, Discontinued hydrochlorothiazide.    Elevated troponin  Flat troponins 15->15, type II MI demand ischemia    UTI (urinary tract infection)  2+ nitrate on urinalysis  Status post Zosyn for concerns of sepsis  Started Rocephin  Follow culture    Lactic acidosis  Patient was suspected to be septic, I do not believe this is sepsis, lactic acidosis likely secondary from dehydration, lactic acidosis improved/normalized with IV fluids.  Lactic acid initially 4.3, improved to 1.2 with IVF.  Status post Zosyn in the ED for concerns of sepsis.   blood cultures are pending  Resolved  with IV fluids    Sinus tachycardia  Secondary to volume depletion from nausea vomiting, resolved with IV fluid hydration.  Patient historically on Toprol XL 50 mg twice daily  Resolved with IV fluids    Adrenal nodule  Incidental finding, will need further workup.  I did not consult urology.  Will wait for hematology oncology input and palliative medicine with goals of care.    Hypothyroidism  TSH normal  Continue Synthroid    Endometrial hyperplasia  Outpatient follow-up with gynecology, will need nonemergent ultrasound    Tobacco use  Greater than 120-pack-year smoking history, recommend smoking cessation.      GI prophylaxis: Pepcid  DVT prophylaxis: On heparin drip    CODE STATUS DNR/DNI no ICU    I spent 95 minutes in the professional and overall care of this patient.      Chet Beal MD

## 2024-10-21 NOTE — ASSESSMENT & PLAN NOTE
Patient was suspected to be septic, I do not believe this is sepsis, lactic acidosis likely secondary from dehydration, lactic acidosis improved/normalized with IV fluids.  Lactic acid initially 4.3, improved to 1.2 with IVF.  Status post Zosyn in the ED for concerns of sepsis.   blood cultures are pending  Resolved with IV fluids

## 2024-10-21 NOTE — PROGRESS NOTES
Physical Therapy                 Therapy Communication Note    Patient Name: Dian Perez  MRN: [id number]  Department: [facility] E  Room: 03/03-A  Today's Date: 10/21/2024     Discipline: Physical Therapy    Missed Visit Reason: Missed Visit Reason: Cancel (pt waiting for cardiology consult due to ? acute thrombus left atrium/ventricle. Current PTT is 137.80 which is out of safe range for PT mobility. Cancel for today.)    Missed Time: Cancel    Comment:

## 2024-10-21 NOTE — PROGRESS NOTES
"Dian Perez is a 79 y.o. female on day 1 of admission presenting with Nausea and vomiting, unspecified vomiting type.    Patient also found to have metastatic lung cancer.  Patient lives alone in a single level house. Prior level of functioning, patient ambulated with a walker. States currently she is bedbound, unable to ambulate. States she has no in home assistance. Asked how she gets to the bathroom or obtain food she states she has a bsc, transfers from bsc to walker to empty the pain in the bathroom. Patient states she has no teeth and requires soft foods. States \"I have people to buy food\". States her grandson takes her to her appointments.  PCP is Angelito Peck MD at [facility].   ADOD 10/23/2024  Based on current level of functioning, patient will need SNF. Patient states she needs more information about her treatment going forward before she can make such decisions. RN[facility] will follow for updates and meet with patient again to discuss discharge planning.     Alivia Estrada RN      "

## 2024-10-21 NOTE — ASSESSMENT & PLAN NOTE
Likely secondary to malignancy, received Compazine in the ED.  I would refrain from giving any more antinausea medication due to prolonged Qtc 616  Ativan for nausea, Pepcid for GI prophylaxis  Monitor electrolytes  Deferring further nausea management to hematology oncology

## 2024-10-21 NOTE — ASSESSMENT & PLAN NOTE
2+ nitrate on urinalysis  Status post Zosyn for concerns of sepsis  Started Rocephin  Follow culture

## 2024-10-21 NOTE — ASSESSMENT & PLAN NOTE
Likely secondary to vomiting, K 2.7 on admission, received 40 mEq plus IV fluid with potassium in the ED  Continue to monitor and replace

## 2024-10-21 NOTE — NURSING NOTE
Pt arrived to the floor.  Placed pt on telemetry, teofilo, and spoke with daughter and pt regarding history.  Pts care has been done at the [facility] mostly.  Pt axo3, states she feels week and achy all over.  Pt in bed, bed alarm on and functioning, call light within reach.  Instructed pt to call if she needs assistance.

## 2024-10-21 NOTE — CONSULTS
"Nutrition Assessement Note    Nutrition Assessment    Reason for Assessment: Admission nursing screening (MST 4)    Reason for Hospital Admission:  Dian Perez is a 79 y.o. female who is admitted for lung mass, NV. Hx of metastatic lung CA. Patient with 50# weight loss in past year. Sleeping soundly at time of visit, unable to wake. Will provide Mighty Shakes TID for added nutrition.  Patient ordered Ketogenic diet, MD to change diet order to CCD 75 at this time. A1c 8.1.    Past Medical History:   Diagnosis Date    Arthritis     Diabetes mellitus (Multi)     Disease of thyroid gland     Hypertension       Past Surgical History:   Procedure Laterality Date    TONSILLECTOMY         Nutrition History:  Food and Nutrient History: NA      Food Allergies/Intolerances:  None  GI Symptoms: Nausea and Vomiting  Oral Problems: None    Anthropometrics:  Ht: 170.2 cm (5' 7\"), Wt: 75.8 kg (167 lb 1.7 oz), BMI: 26.17     Weight Change:  Daily Weight  10/21/24 : 75.8 kg (167 lb 1.7 oz)     Weight History / % Weight Change: MD notes, and records support, 50# (23%) weight loss in past year  Significant Weight Loss: Yes     Nutrition Focused Physical Exam Findings: defer: sleeping      Nutrition Significant Labs:  Lab Results   Component Value Date    WBC 9.7 10/21/2024    HGB 10.4 (L) 10/21/2024    HCT 31.8 (L) 10/21/2024     10/21/2024    ALT 4 (L) 10/20/2024    AST 11 10/20/2024     10/21/2024    K 3.0 (L) 10/21/2024    CL 98 10/21/2024    CREATININE 0.49 (L) 10/21/2024    BUN 5 (L) 10/21/2024    CO2 30 10/21/2024    TSH 0.61 10/20/2024    HGBA1C 8.1 (H) 04/03/2024     Nutrition Specific Medications:  cefTRIAXone, 2 g, intravenous, q24h  famotidine, 20 mg, oral, BID  insulin lispro, 0-10 Units, subcutaneous, TID  levothyroxine, 150 mcg, oral, Daily  metoprolol succinate XL, 50 mg, oral, BID  polyethylene glycol, 17 g, oral, Daily  potassium chloride CR, 20 mEq, oral, Daily  warfarin, 5 mg, oral, Daily      heparin, " 0-4,500 Units/hr, Last Rate: 1,100 Units/hr (10/21/24 0831)      Dietary Orders (From admission, onward)       Start     Ordered    10/21/24 1407  Oral nutritional supplements  Until discontinued        Comments: Any flavor   Question Answer Comment   Deliver with All meals    Select supplement: Sugar Free Mighty Shake        10/21/24 1406    10/21/24 1140  Adult diet Regular, Consistent Carb; CCD 75 gm/meal  Diet effective now        Question Answer Comment   Diet type Regular    Diet type Consistent Carb    Carb diet selection: CCD 75 gm/meal        10/21/24 1139    10/20/24 2157  May Participate in Room Service  ( ROOM SERVICE MAY PARTICIPATE)  Once        Question:  .  Answer:  Yes    10/20/24 2156                   Estimated Needs:   Estimated Energy Needs  Total Energy Estimated Needs (kCal):  (2593-7825)  Total Estimated Energy Need per Day (kCal/kg):  (30-35)  Method for Estimating Needs: Actual Wt    Estimated Protein Needs  Total Protein Estimated Needs (g):  ()  Total Protein Estimated Needs (g/kg):  (1.2-1.5)  Method for Estimating Needs: Actual Wt    Estimated Fluid Needs  Total Fluid Estimated Needs (mL): 1900 mL  Total Fluid Estimated Needs (mL/kg): 25 mL/kg  Method for Estimating Needs: Actual Wt      Nutrition Diagnosis   Nutrition Diagnosis:  Malnutrition Diagnosis  Patient has Malnutrition Diagnosis: Yes  Diagnosis Status: New  Malnutrition Diagnosis: Severe malnutrition related to chronic disease or condition  As Evidenced by: 23% weight loss in past year, PO intake <75% of estimated needs >1 month        Nutrition Interventions/Recommendations   Nutrition Interventions and Recommendations:    Nutrition Prescription:  Individualized Nutrition Prescription Provided for : 0144-2815 calories,  gm protein to be provided via diet/nutritional supplements    Nutrition Interventions:   Food and/or Nutrient Delivery Interventions  Interventions: Meals and snacks, Medical food  supplement  Meals and Snacks: Carbohydrate-modified diet  Goal: provide as ordered  Medical Food Supplement: Modified beverage  Goal: mighty shake TID to provide 200 kcals and 7g protein each    Education Documentation  No documentation found.         Nutrition Monitoring and Evaluation   Monitoring/Evaluation:   Food/Nutrient Related History Monitoring  Monitoring and Evaluation Plan: Energy intake  Energy Intake: Estimated energy intake  Criteria: pt to consume >/= 75% estimated needs    Body Composition/Growth/Weight History  Monitoring and Evaluation Plan: Weight  Weight: Measured weight  Criteria: pt will maintain wt at this time    Biochemical Data, Medical Tests and Procedures  Monitoring and Evaluation Plan: Glucose/endocrine profile  Glucose/Endocrine Profile: Glucose, casual, Hemoglobin A1c (HgbA1c)  Criteria: labs will trend towards desirable range       Time Spent/Follow-up:   Follow Up  Time Spent (min): 30 minutes  Last Date of Nutrition Visit: 10/21/24  Nutrition Follow-Up Needed?: 3-5 days  Follow up Comment: 10/24/24

## 2024-10-21 NOTE — PROGRESS NOTES
Dian Perez is a 79 y.o. female on day 1 of admission presenting with Nausea and vomiting, unspecified vomiting type.      Subjective   States she feels well right now, denies any complaints at the moment. Nausea and vomiting has resolved and is tolerating PO. Had 2 BM today.        Objective     Last Recorded Vitals  /61 (BP Location: Left arm, Patient Position: Lying)   Pulse 73   Temp 36.3 °C (97.3 °F) (Temporal)   Resp 16   Wt 75.8 kg (167 lb 1.7 oz)   SpO2 93%   Intake/Output last 3 Shifts:    Intake/Output Summary (Last 24 hours) at 10/21/2024 1502  Last data filed at 10/21/2024 0803  Gross per 24 hour   Intake 2287.97 ml   Output 700 ml   Net 1587.97 ml       Admission Weight  Weight: 77.1 kg (170 lb) (10/20/24 1520)    Daily Weight  10/21/24 : 75.8 kg (167 lb 1.7 oz)    Image Results  ECG 12 Lead  Sinus tachycardia with occasional Premature ventricular complexes  Nonspecific T wave abnormality  Prolonged QT  Abnormal ECG  No previous ECGs available  Confirmed by Pablito Caraballo ([id number]) on 10/21/2024 12:09:13 PM      Physical Exam  Constitutional:       Appearance: She is ill-appearing.   HENT:      Head: Normocephalic.      Mouth/Throat:      Pharynx: Oropharynx is clear.   Eyes:      General: No scleral icterus.  Cardiovascular:      Rate and Rhythm: Normal rate.   Pulmonary:      Effort: No respiratory distress.      Breath sounds: No wheezing.   Abdominal:      General: There is no distension.      Palpations: Abdomen is soft.   Musculoskeletal:      Right lower leg: No edema.      Left lower leg: No edema.   Neurological:      Mental Status: She is alert.   Psychiatric:         Behavior: Behavior normal.         Relevant Results               Assessment/Plan        Assessment & Plan  Mass of upper lobe of right lung  With metastatic disease  Greater than 120-pack-year smoking history  CT imaging with large multilobulated mass in the right upper lobe and jac with necrosis, enlarged mediastinal  lymph nodes from metastasis, with suspected mass in the left atrium and ventricle, as well as left subpectoral mass and possible SVC involvement with narrowing, and suspected metastasis to left adrenal nodule  Oncology consult  Pulmonology consult  Palliative care consult  Smoking cessation  MRI brain and bone scan ordered by oncology  IR referral for lung biopsy   Metastatic disease (Multi)  As above  Nausea and vomiting, unspecified vomiting type  Likely related to malignancy  Resolved   Left atrial mass  CT imaging showing thrombus in the left atrium and left ventricle  Continue heparin drip  Echo ordered  Cardiology consulted  Started on coumadin by cardiology - will bridge with heparin and stop heparin gtt when INR is therapeutic  Prolonged Q-T interval on ECG  QTc 616 on EKG, avoid QT prolonging medication.  Type 2 diabetes mellitus  HbA1c 6.6%  Continue SSI  Hypoglycemia protocol  Hypokalemia  Replace PRN  Acute anemia  GI consulted  Stool occult negative  Likely related to malignancy  Continue PPI BID  Hypomagnesemia  Replace PRN  Hyponatremia  Hydrochlorothiazide discontinued  Resolved   Elevated troponin  Flat troponins 15->15, type II MI demand ischemia  UTI (urinary tract infection)  Low suspicion for UTI  Follow up urine culture  Low threshold to discontinue empiric rocephin  Lactic acidosis  Suspect due to volume depletion  Resolved   Sinus tachycardia  Suspect due to volume depletion  Resolved   Adrenal nodule  Incidental finding  Follow up oncology and palliative care evaluations  Consider nephrology referral at discharge for further workup   Hypothyroidism  TSH normal  Continue Synthroid  Endometrial hyperplasia  Outpatient follow-up with gynecology, will need nonemergent ultrasound  Tobacco use  Greater than 120-pack-year smoking history, recommend smoking cessation.    Discussed with Dr. Baker and Dr. Graham    Plan:  IR consulted for biopsy of lung mass  Started on coumadin, continue heparin  gtt for now, discontinue heparin when INR is therapeutic   Follow up urine culture, low threshold to discontinue rocephin  PT/OT   Anticipate will likely need SNF placement at discharge            Ina Lane MD

## 2024-10-21 NOTE — ASSESSMENT & PLAN NOTE
Incidental finding, will need further workup.  I did not consult urology.  Will wait for hematology oncology input and palliative medicine with goals of care.

## 2024-10-21 NOTE — PROGRESS NOTES
10/21/24 1106   [facility] Disability Status   Are you deaf or do you have serious difficulty hearing? N   Are you blind or do you have serious difficulty seeing, even when wearing glasses? N   Because of a physical, mental, or emotional condition, do you have serious difficulty concentrating, remembering, or making decisions? (5 years old or older) N   Do you have serious difficulty walking or climbing stairs? Y   Do you have serious difficulty dressing or bathing? Y   Because of a physical, mental, or emotional condition, do you have serious difficulty doing errands alone such as visiting the doctor? Y

## 2024-10-21 NOTE — CONSULTS
Pulmonary Consultation Note   Subjective    Reason for Consult:    History of Present Illness:  Dian Perez is a 79 y.o. year old female patient admitted on 10/20/2024 with nausea, vomiting, dizziness. Ms. Perez has been a lifelong smoker. She has lost a significant amount of weight over the last few years. She was brought to the ED after collapsing at home. A CT scan found a large lung mass invading the mediastinum with extensive mediastinal lymphadenopathy and distant metastases. She has also had a cough productive of thin sputum. She was found to have hypokalemia and hypomagnesemia. She was admitted for correction of her electrolytes, control of her nausea and vomiting and evaluation of her malignancy.     Past Medical History  She has a past medical history of Arthritis, Diabetes mellitus (Multi), Disease of thyroid gland, and Hypertension.    Surgical History  She has a past surgical history that includes Tonsillectomy.     Social History  Social History     Tobacco Use   • Smoking status: Every Day     Current packs/day: 2.00     Average packs/day: 2.0 packs/day for 62.8 years (125.6 ttl pk-yrs)     Types: Cigarettes     Start date: 1962   • Smokeless tobacco: Never   Substance Use Topics   • Alcohol use: Never   • Drug use: Never       Family History  No family history on file.     Allergies  Fluorouracil-adhesive bandage and Statins-hmg-coa reductase inhibitors    Review of Systems: fatigue, weight loss, nasal congestion as per hpi    Meds    Scheduled medications  cefTRIAXone, 2 g, intravenous, q24h  fluticasone, 2 spray, Each Nostril, Daily  insulin lispro, 0-10 Units, subcutaneous, TID  levothyroxine, 150 mcg, oral, Daily  LORazepam, 1 mg, oral, Once  metoprolol succinate XL, 50 mg, oral, BID  pantoprazole, 40 mg, intravenous, BID  polyethylene glycol, 17 g, oral, Daily  potassium chloride CR, 20 mEq, oral, Daily  warfarin, 5 mg, oral, Daily      Continuous medications  heparin, 0-4,500 Units/hr, Last Rate:  "900 Units/hr (10/21/24 1623)      PRN medications  PRN medications: acetaminophen **OR** acetaminophen **OR** acetaminophen, dextrose, dextrose, glucagon, glucagon, heparin (porcine), LORazepam, meclizine, melatonin, traMADol     Objective    Blood pressure (!) 115/48, pulse 82, temperature 36.4 °C (97.5 °F), temperature source Temporal, resp. rate 16, height 1.702 m (5' 7\"), weight 75.8 kg (167 lb 1.7 oz), SpO2 96%.   Physical Exam   GENERAL: elderly woman. No respiratory distress  HEAD/SINUSES: no sinus tenderness  OROPHARYNX: Moist mucosa, no thrush or lesions   NECK: no JVD, midline trachea without stridor. Thyroid not enlarged  LUNGS: Symmetric chest. Good excursion.   EXTREMITIES: No edema, no varicose veins  NEURO: grossly normal mental status, CN reflexes and motor strength.   SKIN: Skin turgor normal. No rashes or lesions.   PSYCH: Normal affect    Intake/Output Summary (Last 24 hours) at 10/21/2024 1852  Last data filed at 10/21/2024 1603  Gross per 24 hour   Intake 2527.97 ml   Output 1100 ml   Net 1427.97 ml     Labs:   Results from last 72 hours   Lab Units 10/21/24  0632 10/20/24  1541   SODIUM mmol/L 136 131*   POTASSIUM mmol/L 3.0* 2.7*   CHLORIDE mmol/L 98 86*   CO2 mmol/L 30 29   BUN mg/dL 5* 7   CREATININE mg/dL 0.49* 0.74   GLUCOSE mg/dL 89 215*   CALCIUM mg/dL 9.0 10.3   ANION GAP mmol/L 11 19   EGFR mL/min/1.73m*2 >90 83      Results from last 72 hours   Lab Units 10/21/24  0740 10/21/24  0632 10/21/24  0314 10/20/24  2059 10/20/24  1541   WBC AUTO x10*3/uL  --  9.7  --  9.8 11.9*   HEMOGLOBIN g/dL 10.4* 10.4*  10.4* 10.4* 9.8* 12.5   HEMATOCRIT % 31.8* 31.8*  31.8* 32.2* 30.5* 37.4   PLATELETS AUTO x10*3/uL  --  357  --  341 457*   NEUTROS PCT AUTO %  --   --   --   --  69.4   LYMPHS PCT AUTO %  --   --   --   --  16.8   MONOS PCT AUTO %  --   --   --   --  11.4   EOS PCT AUTO %  --   --   --   --  0.8      Micro/ID:   Lab Results   Component Value Date    BLOODCULT Loaded on Instrument - " Culture in progress 10/20/2024    BLOODCULT Loaded on Instrument - Culture in progress 10/20/2024     Summary of key imaging results from the last 24 hours  There is a large heterogeneous multilobulated mass in the right upper  lobe and jac measuring approximately 8.6 x 12.3 x 8.3 cm. This  demonstrates central foci of gas and hypoattenuation concerning for  necrosis. There is marked narrowing of the right upper lobe pulmonary  artery with occlusion of the right upper lobe pulmonary vein and  bronchus. There is mass-effect and narrowing of the SVC. Findings are  highly concerning for primary malignancy.      There are multiple enlarged mediastinal lymph nodes concerning for  alton metastases. The largest in the subcarinal region demonstrates  central hypoattenuation measuring 3.6 x 5.3 cm concerning for central  necrosis. There is loss of fat plane with the adjacent esophagus and  left ventricle with a lobulated 2.8 x 3.6 x 2.8 cm mass in the left  atrium which is highly concerning for local invasion. Focal thrombus  in the left ventricle not excluded. Further evaluation with  echocardiogram is recommended.      There is a heterogeneous enlarged left subpectoral mass measuring 3.8  x 3.8 cm consistent with metastases.      Heterogeneous 3 cm left adrenal nodule is concerning for metastases.      The endometrial complex is abnormally thickened measuring up to 11  mm. Given patient's stated age differential considerations include  hyperplasia versus neoplasia. Gynecological consultation further  evaluation with nonemergent ultrasound is advised.      Diverticulosis without evidence for acute diverticulitis. Mural  stratification of the colon likely sequela of remote colitis.  Correlate with symptomatology if there is concern for superimposed  early colitis.    Radha Perez is a 79 y.o. year old female patient is being seen by the pulmonary service for   COPD  Lung mass with metasteses  Post obstructive  pneumonia  Right atrial mass vs. Thrombus      Recommendations   As follows:  Oncology has ordered tissue biopsy by IR. Agree that percutaneous biopsy is the safest option given right atrial mass/thrombus  Change ceftriaxone to unasyn for post-obstructive pneumonia - better anaerobic coverage, can change to augmentin at discharge. Plan for at least a 14 day course  ICS/LABA for COPD. Avoid Ippratropriium/spiriva as it can dry up secretions   Flutter device for airway clearance  Does not currently want any nicotine replacement therapy    Emiliana Nails MD PhD   10/21/24 at 6:52 PM     Disclaimer: Documentation completed with the information available at the time of input. Parts of this note may have been scribed or generated using voice dictation software, Dragon.  Homophonic errors may exist.  Please contact me directly if clarification is needed. The times in the chart may not be reflective of actual patient care times, interventions, or procedures. Documentation occurs after the physical care of the patient.

## 2024-10-21 NOTE — CONSULTS
Inpatient consult to Palliative Care  Consult performed by: MARILY Prater-CNP  Consult ordered by: Chet Beal MD  Reason for consult: Goals of Care          Reason For Consult  Reason for Consult: communication / medical decision making     History Of Present Illness  Dian Perez is a 79 y.o. female with past medical history of resenting with nausea, vomiting, shortness of breath, myalgias and weakness.  Poor historian.  Some history taken from daughter who does not live with her.  It was reported this morning she was so weak she fell off the toilet did not have a head strike, due to feeling lightheaded and dizzy.  She reports feeling feverish with chills, was given Tylenol in the ED.  She has had a lingering non-bloody worsening cough for a week, also nonbloody nonbilious vomiting and dry heaving for a couple days.  In the ED vitals notable for , RR 21, BP 97/67.  Labs notable for glucose 215, sodium 131, K2.7, CL 86, magnesium 1.46, lactic acid 4.3->1.2, hemoglobin 12.5->9.8, UA 2+ nitrite, troponin 15-> 15.  TSH normal flu A/B negative.  CT chest abdomen pelvis:  - Large heterogeneous multilobulated mass in the right upper lobe and jac measuring 8.6 x 12.3 x 8.3 cm with concerns of necrosis.   -Multiple enlarged mediastinal lymph nodes concerning for alton metastasis with concerns for central necrosis  -2.8 x 3.6 x 2.8 cm mass in the left atrium concerning for local invasion.  Focal thrombus in the left ventricle not excluded  -Enlarged left subpectoral mass measuring 3.8 x 3.8 cm consistent with metastasis  -Mass effect and narrowing of SVC  -Thickened endometrial complex measuring up to 11 mm suspicious for hyperplasia versus neoplasia.  -Possible superimposed early colitis  -Incidental heterogeneous 3 cm left adrenal nodule concerning for metastasis  Started on heparin gtt for possible thrombus LA, seen by cardiology, echo done today. Seen by oncology, pulmonology. IR consulted for lung  biopsy, tumor markers drawn. MRI brain and bone scan ordered.   Currently she complains of pain described as muscle aches and bone aches with stiffness and soreness. She also reports history of chronic migraines, typically takes 6-8 excedrin daily. She also reports poor functional status at baseline, typically stays in bed and uses BSC for stool/urine. Family provides food. She reports limiting oral fluid intake d/t function, stating she does not want to get up to urinate. She describes dizziness with position changes. She denies chest pain. She reports a history of severe GERD.      Symptoms (0 - 10, Best to Worst)  Shirley Symptom Assessment System  0-10 (Numeric) Pain Score: 0 - No pain    BM in last 48 hours? unknown    Emotional/Psychological/Spiritual Needs  Over the past two weeks, how often has the patient been bothered by having little interest or pleasure in doing things?  occurrence (last two weeks): several days    Over the past two weeks, how often has the patient been bothered by feeling down, depressed, or hopeless?  occurrence (last two weeks): several days    Screening for spiritual needs?  No    Serious Illness Conversation  What is your understanding now of where you are with your illness:  patient fully aware of recent findings. Daughter is as well.   How much information about what is likely to be ahead with your illness  would you like from me: fully disclose all information to both patient and daughter Kenia.   What are your most important goals if your health situation worsens:  quality of life is key to patient as she already has poor quality of life at baseline  What are your biggest fears and worries about the future with your health:  she does not want to suffer  What gives you strength as you think about the future with your illness:  support of daughter and grandson  What abilities are so critical to your life that you can’t imagine living without them:  she values controlled symptoms  and ability to spend time with family.   If you become sicker, how much are you willing to go through for the possibility of gaining more time:  patient very clear that she is ready to pass away, she does not want any treatment of her cancer.   How much does your family know about your priorities and wishes:  family is fully aware of patient wishes.     Personal/Social History    She reports that she has been smoking cigarettes. She started smoking about 62 years ago. She has a 125.6 pack-year smoking history. She has never used smokeless tobacco. She reports that she does not drink alcohol and does not use drugs.    Functional Status    Bedbound at baseline, she is able to get up and ambulate short distances only and only 1-2 times per day.     Caregiving/Caregiver Support  Does the patient require assistance in some or all components of his care, including coordination of medical care? Yes  If Yes, which person serves that role?  child   Caregiver emotional or practical needs:      Past Medical History  She has a past medical history of Arthritis, Diabetes mellitus (Multi), Disease of thyroid gland, and Hypertension.    Surgical History  She has a past surgical history that includes Tonsillectomy.     Family History  No family history on file.  Allergies  Fluorouracil-adhesive bandage and Statins-hmg-coa reductase inhibitors    Review of Systems   Constitutional:  Positive for activity change, appetite change, fatigue and unexpected weight change. Negative for chills and fever.        Was 189 4/4/24 per PCP note.    HENT:  Positive for rhinorrhea. Negative for facial swelling, mouth sores, sinus pain and sore throat.    Eyes:  Negative for pain.   Respiratory:  Positive for cough and shortness of breath.    Cardiovascular:  Negative for chest pain, palpitations and leg swelling.   Gastrointestinal:  Positive for nausea and vomiting. Negative for abdominal distention, abdominal pain, constipation and diarrhea.         GERD   Endocrine: Negative for polydipsia, polyphagia and polyuria.   Genitourinary:  Negative for difficulty urinating and hematuria.   Musculoskeletal:  Positive for arthralgias, back pain, myalgias and neck pain. Negative for gait problem.   Skin:  Negative for color change, rash and wound.   Neurological:  Positive for dizziness, weakness and light-headedness. Negative for tremors, seizures and headaches.        Postional, hx of vertigo, hx of migraines, takes 6-8 excedrin daily   Psychiatric/Behavioral:  Negative for confusion and sleep disturbance. The patient is not nervous/anxious.         Physical Exam  Vitals and nursing note reviewed.   Constitutional:       General: She is not in acute distress.     Appearance: She is ill-appearing.      Comments: Appears stated age, chronically ill appearing, lying in bed, no nonverbal signs of distress   HENT:      Head: Normocephalic and atraumatic.      Comments: alopecia     Nose: Congestion and rhinorrhea present.      Mouth/Throat:      Mouth: Mucous membranes are moist.      Pharynx: Oropharynx is clear.   Eyes:      General: No scleral icterus.        Right eye: No discharge.         Left eye: No discharge.      Extraocular Movements: Extraocular movements intact.      Pupils: Pupils are equal, round, and reactive to light.   Neck:      Comments: Limited flexion, rotation  Cardiovascular:      Rate and Rhythm: Normal rate and regular rhythm.      Pulses: Normal pulses.      Heart sounds: No murmur heard.  Pulmonary:      Effort: Pulmonary effort is normal. No respiratory distress.      Breath sounds: Rhonchi present.      Comments: RA, moist nonprod cough, diminished RUL  Abdominal:      General: Abdomen is flat. Bowel sounds are normal. There is no distension.      Palpations: Abdomen is soft.      Tenderness: There is abdominal tenderness.      Comments: Epigastric tenderness   Musculoskeletal:         General: Tenderness present. No swelling, deformity or  "signs of injury. Normal range of motion.      Cervical back: Normal range of motion and neck supple. Tenderness present.      Right lower leg: No edema.      Left lower leg: No edema.   Skin:     General: Skin is warm and dry.      Capillary Refill: Capillary refill takes 2 to 3 seconds.      Coloration: Skin is pale.   Neurological:      General: No focal deficit present.      Mental Status: She is alert and oriented to person, place, and time.      Sensory: No sensory deficit.      Motor: Weakness present.      Coordination: Coordination normal.   Psychiatric:         Mood and Affect: Mood normal.         Behavior: Behavior normal.         Last Recorded Vitals  Blood pressure (!) 115/48, pulse 82, temperature 36.4 °C (97.5 °F), temperature source Temporal, resp. rate 16, height 1.702 m (5' 7\"), weight 75.8 kg (167 lb 1.7 oz), SpO2 96%.    Relevant Results  Results for orders placed or performed during the hospital encounter of 10/20/24 (from the past 24 hours)   Lactate   Result Value Ref Range    Lactate 1.2 0.4 - 2.0 mmol/L   aPTT   Result Value Ref Range    aPTT 28.6 22.0 - 32.5 seconds   CBC   Result Value Ref Range    WBC 9.8 4.4 - 11.3 x10*3/uL    nRBC 0.0 0.0 - 0.0 /100 WBCs    RBC 3.29 (L) 4.00 - 5.20 x10*6/uL    Hemoglobin 9.8 (L) 12.0 - 16.0 g/dL    Hematocrit 30.5 (L) 36.0 - 46.0 %    MCV 93 80 - 100 fL    MCH 29.8 26.0 - 34.0 pg    MCHC 32.1 32.0 - 36.0 g/dL    RDW 14.6 (H) 11.5 - 14.5 %    Platelets 341 150 - 450 x10*3/uL   Hemoglobin A1C   Result Value Ref Range    Hemoglobin A1C 6.6 (H) See comment %    Estimated Average Glucose 143 Not Established mg/dL   Urinalysis with Reflex Culture and Microscopic   Result Value Ref Range    Color, Urine Colorless (N) Light-Yellow, Yellow, Dark-Yellow    Appearance, Urine Clear Clear    Specific Gravity, Urine 1.029 1.005 - 1.035    pH, Urine 5.5 5.0, 5.5, 6.0, 6.5, 7.0, 7.5, 8.0    Protein, Urine NEGATIVE NEGATIVE, 10 (TRACE), 20 (TRACE) mg/dL    Glucose, " Urine Normal Normal mg/dL    Blood, Urine NEGATIVE NEGATIVE    Ketones, Urine NEGATIVE NEGATIVE mg/dL    Bilirubin, Urine NEGATIVE NEGATIVE    Urobilinogen, Urine Normal Normal mg/dL    Nitrite, Urine 2+ (A) NEGATIVE    Leukocyte Esterase, Urine NEGATIVE NEGATIVE   Extra Urine Gray Tube   Result Value Ref Range    Extra Tube Hold for add-ons.    Microscopic Only, Urine   Result Value Ref Range    WBC, Urine NONE 1-5, NONE /HPF    RBC, Urine 1-2 NONE, 1-2, 3-5 /HPF    Squamous Epithelial Cells, Urine 1-9 (SPARSE) Reference range not established. /HPF   Hemoglobin and hematocrit, blood   Result Value Ref Range    Hemoglobin 10.4 (L) 12.0 - 16.0 g/dL    Hematocrit 32.2 (L) 36.0 - 46.0 %   CBC   Result Value Ref Range    WBC 9.7 4.4 - 11.3 x10*3/uL    nRBC 0.0 0.0 - 0.0 /100 WBCs    RBC 3.41 (L) 4.00 - 5.20 x10*6/uL    Hemoglobin 10.4 (L) 12.0 - 16.0 g/dL    Hematocrit 31.8 (L) 36.0 - 46.0 %    MCV 93 80 - 100 fL    MCH 30.5 26.0 - 34.0 pg    MCHC 32.7 32.0 - 36.0 g/dL    RDW 14.7 (H) 11.5 - 14.5 %    Platelets 357 150 - 450 x10*3/uL   Basic metabolic panel   Result Value Ref Range    Glucose 89 74 - 99 mg/dL    Sodium 136 136 - 145 mmol/L    Potassium 3.0 (L) 3.5 - 5.3 mmol/L    Chloride 98 98 - 107 mmol/L    Bicarbonate 30 21 - 32 mmol/L    Anion Gap 11 10 - 20 mmol/L    Urea Nitrogen 5 (L) 6 - 23 mg/dL    Creatinine 0.49 (L) 0.50 - 1.05 mg/dL    eGFR >90 >60 mL/min/1.73m*2    Calcium 9.0 8.6 - 10.3 mg/dL   Hemoglobin and hematocrit, blood   Result Value Ref Range    Hemoglobin 10.4 (L) 12.0 - 16.0 g/dL    Hematocrit 31.8 (L) 36.0 - 46.0 %   aPTT   Result Value Ref Range    aPTT 137.8 (HH) 22.0 - 32.5 seconds   Hemoglobin and hematocrit, blood   Result Value Ref Range    Hemoglobin 10.4 (L) 12.0 - 16.0 g/dL    Hematocrit 31.8 (L) 36.0 - 46.0 %   Iron and TIBC   Result Value Ref Range    Iron 34 (L) 35 - 150 ug/dL    UIBC 145 110 - 370 ug/dL    TIBC 179 (L) 240 - 445 ug/dL    % Saturation 19 (L) 25 - 45 %   Ferritin    Result Value Ref Range    Ferritin 440 (H) 8 - 150 ng/mL   Folate   Result Value Ref Range    Folate, Serum 3.5 (L) >5.0 ng/mL   Vitamin B12   Result Value Ref Range    Vitamin B12 411 211 - 911 pg/mL   POCT GLUCOSE   Result Value Ref Range    POCT Glucose 98 74 - 99 mg/dL   Occult Blood, Stool    Specimen: Stool   Result Value Ref Range    Occult Blood, Stool X1 Negative Negative   Transthoracic Echo (TTE) Complete   Result Value Ref Range    BSA 1.89 m2   APTT   Result Value Ref Range    aPTT 86.8 (H) 22.0 - 32.5 seconds   POCT GLUCOSE   Result Value Ref Range    POCT Glucose 180 (H) 74 - 99 mg/dL   POCT GLUCOSE   Result Value Ref Range    POCT Glucose 157 (H) 74 - 99 mg/dL    ECG 12 Lead    Result Date: 10/21/2024  Sinus tachycardia with occasional Premature ventricular complexes Nonspecific T wave abnormality Prolonged QT Abnormal ECG No previous ECGs available Confirmed by Pablito Caraballo ([id number]) on 10/21/2024 12:09:13 PM    CT chest abdomen pelvis w IV contrast    Result Date: 10/20/2024  Interpreted By:  Linda Stafford, STUDY: CT CHEST ABDOMEN PELVIS W IV CONTRAST;  10/20/2024 5:30 pm   INDICATION: Signs/Symptoms:n/v.   COMPARISON: None.   ACCESSION NUMBER(S): [id number]   ORDERING CLINICIAN: LUCY HOLLAND   TECHNIQUE: Axial CT images of the chest, abdomen and pelvis with coronal and sagittal reconstructed images obtained after intravenous administration of 75 mL of Omnipaque 350.   FINDINGS: CHEST:   VESSELS: Aorta is normal caliber. Atherosclerotic changes in the aorta and coronary arteries. HEART: Normal size. No pericardial effusion. There is a lobulated 2.8 x 3.6 x 2.8 cm mass in the left atrium which appears contiguous with large subcarinal hypoattenuating mass described below. MEDIASTINUM AND MONROE: Multiple enlarged mediastinal lymph nodes are present, largest in the subcarinal region demonstrates central hypoattenuation measuring 3.6 x 5.3 cm concerning for central necrosis. There is loss of fat  plane with the adjacent esophagus as well as left ventricle concerning for local invasion. Additional enlarged and prominent thoracic lymph nodes noted. LUNG, PLEURA, LARGE AIRWAYS: There is a multilobulated centrally hypointense mass in the right upper lobe and jac measuring approximately 8.6 x 12.3 x 8.3 cm. This demonstrates central foci of gas and hypoattenuation concerning for necrosis. There is marked narrowing of the right upper lobe pulmonary artery with occlusion of the right upper lobe pulmonary vein and bronchus. There is mass-effect and narrowing of the SVC. Findings are highly concerning for primary malignancy. Left lung is clear. No effusion or pneumothorax. CHEST WALL AND LOWER NECK: Subcentimeter hypodense nodule and coarse calcification in the right lobe of the thyroid gland is nonspecific. There is a heterogeneous enlarged left subpectoral mass measuring 3.8 x 3.8 cm. BONES: Generalized diffuse osteopenia. Multilevel degenerative changes of the spine. Bilateral shoulder osteoarthrosis.   ABDOMEN:   LIVER: Within normal limits. BILE DUCTS: Normal caliber. GALLBLADDER: Status post cholecystectomy. PANCREAS: Marked fatty atrophy of the pancreas. SPLEEN: Within normal limits. ADRENALS: Heterogeneous 3 cm left adrenal nodule concerning for metastases. Right adrenal glands within normal limits. KIDNEYS: Symmetric renal enhancement. No hydronephrosis or perinephric fluid collection. URETERS: No hydroureter.   VESSELS: Calcific atherosclerosis of the aortoiliac vessels. No aortic aneurysm. RETROPERITONEUM: Within normal limits.   PELVIS:   REPRODUCTIVE ORGANS: Anteverted uterus. The endometrial complex is abnormally thickened measuring up to 11 mm. No adnexal mass. BLADDER: Within normal limits.   BOWEL: Stomach is under distended. Visualized loops of bowel are without evidence for obstruction. Appendix is not identified with certainty. No pericecal inflammatory changes seen. Mural stratification of the  colon likely sequela of remote colitis. A few scattered colonic diverticula without evidence for acute diverticulitis. No pneumatosis or portal venous gas. PERITONEUM: No ascites or free air, no fluid collection.   ABDOMINAL WALL: Within normal limits. BONES: Generalized diffuse osteopenia. Multilevel degenerative changes of the spine. Mild S shaped scoliosis.       There is a large heterogeneous multilobulated mass in the right upper lobe and jac measuring approximately 8.6 x 12.3 x 8.3 cm. This demonstrates central foci of gas and hypoattenuation concerning for necrosis. There is marked narrowing of the right upper lobe pulmonary artery with occlusion of the right upper lobe pulmonary vein and bronchus. There is mass-effect and narrowing of the SVC. Findings are highly concerning for primary malignancy.   There are multiple enlarged mediastinal lymph nodes concerning for alton metastases. The largest in the subcarinal region demonstrates central hypoattenuation measuring 3.6 x 5.3 cm concerning for central necrosis. There is loss of fat plane with the adjacent esophagus and left ventricle with a lobulated 2.8 x 3.6 x 2.8 cm mass in the left atrium which is highly concerning for local invasion. Focal thrombus in the left ventricle not excluded. Further evaluation with echocardiogram is recommended.   There is a heterogeneous enlarged left subpectoral mass measuring 3.8 x 3.8 cm consistent with metastases.   Heterogeneous 3 cm left adrenal nodule is concerning for metastases.   The endometrial complex is abnormally thickened measuring up to 11 mm. Given patient's stated age differential considerations include hyperplasia versus neoplasia. Gynecological consultation further evaluation with nonemergent ultrasound is advised.   Diverticulosis without evidence for acute diverticulitis. Mural stratification of the colon likely sequela of remote colitis. Correlate with symptomatology if there is concern for superimposed  early colitis.   Additional findings as described above.   MACRO: Linda Stafford discussed the significance and urgency of this critical finding by telephone with  LUCY HOLLAND on 10/20/2024 at 6:36 pm. (**-RCF-**) Findings:  See findings.   Signed by: Linda Stafford 10/20/2024 6:44 PM Dictation workstation:   [id number]    XR chest 1 view    Result Date: 10/20/2024  Interpreted By:  Divina Garcia, STUDY: XR CHEST 1 VIEW; ;  10/20/2024 4:03 pm   INDICATION: Signs/Symptoms:weakness.     COMPARISON: 01/08/2008   ACCESSION NUMBER(S): [id number]   ORDERING CLINICIAN: LUCY HOLLAND   FINDINGS: There is a 9 x 9 cm mass like airspace opacity in the right upper lung zone silhouetting the right perihilar region. Left lung is relatively clear. No large pleural effusions within limits of portable technique. No large pneumothorax. No acute osseous findings.       9 x 9 cm masslike airspace opacity in the right upper lung zone is concerning neoplastic process. Recommend further evaluation with CT chest with contrast.     MACRO: Critical Finding:  See findings. Notification was initiated on 10/20/2024 at 4:40 pm by Dr. Divina Garcia.  (**-YCF-**) Instructions:   Signed by: Divina Garcia 10/20/2024 4:41 PM Dictation workstation:   [id number]       Assessment/Plan   IMP:    UTI  Nausea/Vomiting  LA Thrombus  Mass RUL with L adrenal nodule, mediastinal LN, L subpectoral mass  Prolonged QTC interval  Hypokalemia/Hyponatremia  Anemia  Debility  OA Pain  Palliative Care    DNRCCA/DNI/No ICU  Capable  Has 3 children, Kenia, Kamille and estranged son  No advanced directives.    Patient very clear that she does not want any treatment for her likely malignancy, however she is agreeable to lung biopsy to confirm diagnosis and she is ok with bone scan, MRI brain to assist with prognostication. She values quality of life and confesses that her quality of life currently is very poor. She is not afraid to die. She is bedbound at baseline  and agrees with daughter that she cannot safely return home.   Discussed concerns and wishes with attending and daughter. Plan to medically stablize, pursue lung biopsy while inpatient. Then plan for rehab to optimize physical function. Likely will require placement after rehab, with hospice for supportive care. Care coordination involved, will need family choices for SNF. We initiated conversations about hospice referral, will follow up tomorrow for patient/family decision. Daughter planning to contact [employer] to get simple will and DPOA. I provided copy of HPOA and LW for daughter/patient to review, will follow up tomorrow to sign/witness.     Treatment model of care, plan for SNF when medically stable, will then likely transition to LTC with hospice.     Symptom Management  Pain: uncontrolled, moderate to severe  Medications recommended for pain?  Yes  Tiredness: severe  Nausea: none currently  Depression: mild  Anxiety: none  Drowsiness: none  Appetite: poor  Wellbeing: poor  Dyspnea: none at rest, present with exertion  Intervention recommended for dyspnea?  no  Other: na  Intervention recommended for constipation?  No    I spent 90 minutes in the professional and overall care of this patient. Spent 35min in acp discussion with patient and daughter.       Jimena Kan, APRN-CNP

## 2024-10-21 NOTE — NURSING NOTE
Heparin restarted at this time at decreased rate after holding x 1 hour for elevated aPTT.    New rate 1100 units/hr.

## 2024-10-21 NOTE — NURSING NOTE
Assumed care of patient . Patient alert and oriented . Patient has no complaints of pain . Lying in bed and call light in reach .

## 2024-10-21 NOTE — CARE PLAN
The patient's goals for the shift include  complete testing and see consults    The clinical goals for the shift include wean off oxygen    Over the shift, the patient did not make progress toward the following goals. Barriers to progression include no O2. Recommendations to address these barriers include n/a.

## 2024-10-21 NOTE — ASSESSMENT & PLAN NOTE
QTc 616 on EKG, avoid QT prolonging medication.  Ativan for nausea, deferring management to heme-onc

## 2024-10-21 NOTE — ASSESSMENT & PLAN NOTE
With metastatic disease  Greater than 120-pack-year smoking history  CT imaging with large multilobulated mass in the right upper lobe and jac with necrosis, enlarged mediastinal lymph nodes from metastasis, with suspected mass in the left atrium and ventricle, as well as left subpectoral mass and possible SVC involvement with narrowing, and suspected metastasis to left adrenal nodule  Oncology consult  Pulmonology consult  Palliative care consult  Smoking cessation  MRI brain and bone scan ordered by oncology  IR referral for lung biopsy

## 2024-10-22 ENCOUNTER — APPOINTMENT (OUTPATIENT)
Dept: RADIOLOGY | Facility: HOSPITAL | Age: 79
DRG: 180 | End: 2024-10-22
Payer: MEDICARE

## 2024-10-22 LAB
ALBUMIN SERPL BCP-MCNC: 2.6 G/DL (ref 3.4–5)
ALP SERPL-CCNC: 39 U/L (ref 33–136)
ALT SERPL W P-5'-P-CCNC: <3 U/L (ref 7–45)
ANION GAP SERPL CALCULATED.3IONS-SCNC: 9 MMOL/L (ref 10–20)
AORTIC VALVE MEAN GRADIENT: 3.3 MMHG
AORTIC VALVE PEAK VELOCITY: 1.22 M/S
APTT PPP: 50.4 SECONDS (ref 22–32.5)
APTT PPP: 65.2 SECONDS (ref 22–32.5)
AST SERPL W P-5'-P-CCNC: 10 U/L (ref 9–39)
AV PEAK GRADIENT: 6 MMHG
AVA (PEAK VEL): 3.48 CM2
AVA (VTI): 3.57 CM2
BACTERIA UR CULT: NORMAL
BILIRUB SERPL-MCNC: 0.4 MG/DL (ref 0–1.2)
BUN SERPL-MCNC: 5 MG/DL (ref 6–23)
CALCIUM SERPL-MCNC: 8.7 MG/DL (ref 8.6–10.3)
CHLORIDE SERPL-SCNC: 96 MMOL/L (ref 98–107)
CO2 SERPL-SCNC: 30 MMOL/L (ref 21–32)
CREAT SERPL-MCNC: 0.66 MG/DL (ref 0.5–1.05)
EGFRCR SERPLBLD CKD-EPI 2021: 90 ML/MIN/1.73M*2
EJECTION FRACTION APICAL 4 CHAMBER: 54.4
EJECTION FRACTION: 58 %
ERYTHROCYTE [DISTWIDTH] IN BLOOD BY AUTOMATED COUNT: 14.9 % (ref 11.5–14.5)
GLUCOSE BLD MANUAL STRIP-MCNC: 124 MG/DL (ref 74–99)
GLUCOSE BLD MANUAL STRIP-MCNC: 330 MG/DL (ref 74–99)
GLUCOSE SERPL-MCNC: 167 MG/DL (ref 74–99)
HCT VFR BLD AUTO: 25.8 % (ref 36–46)
HGB BLD-MCNC: 8.3 G/DL (ref 12–16)
HOLD SPECIMEN: NORMAL
INR PPP: 1.1 (ref 0.9–1.2)
LEFT ATRIUM VOLUME AREA LENGTH INDEX BSA: 24.7 ML/M2
LEFT VENTRICLE INTERNAL DIMENSION DIASTOLE: 3.91 CM (ref 3.5–6)
LEFT VENTRICULAR OUTFLOW TRACT DIAMETER: 2.24 CM
LV EJECTION FRACTION BIPLANE: 58 %
MAGNESIUM SERPL-MCNC: 1.6 MG/DL (ref 1.6–2.4)
MCH RBC QN AUTO: 30.5 PG (ref 26–34)
MCHC RBC AUTO-ENTMCNC: 32.2 G/DL (ref 32–36)
MCV RBC AUTO: 95 FL (ref 80–100)
MITRAL VALVE E/A RATIO: 0.92
MITRAL VALVE E/E' RATIO: 10.64
NRBC BLD-RTO: 0 /100 WBCS (ref 0–0)
PLATELET # BLD AUTO: 287 X10*3/UL (ref 150–450)
POTASSIUM SERPL-SCNC: 3 MMOL/L (ref 3.5–5.3)
PROT SERPL-MCNC: 6.5 G/DL (ref 6.4–8.2)
PROTHROMBIN TIME: 11.6 SECONDS (ref 9.3–12.7)
RBC # BLD AUTO: 2.72 X10*6/UL (ref 4–5.2)
RIGHT VENTRICLE FREE WALL PEAK S': 15.98 CM/S
RIGHT VENTRICLE PEAK SYSTOLIC PRESSURE: 22.3 MMHG
SODIUM SERPL-SCNC: 132 MMOL/L (ref 136–145)
TRICUSPID ANNULAR PLANE SYSTOLIC EXCURSION: 2.3 CM
WBC # BLD AUTO: 8.7 X10*3/UL (ref 4.4–11.3)

## 2024-10-22 PROCEDURE — 94668 MNPJ CHEST WALL SBSQ: CPT

## 2024-10-22 PROCEDURE — 2500000002 HC RX 250 W HCPCS SELF ADMINISTERED DRUGS (ALT 637 FOR MEDICARE OP, ALT 636 FOR OP/ED): Performed by: STUDENT IN AN ORGANIZED HEALTH CARE EDUCATION/TRAINING PROGRAM

## 2024-10-22 PROCEDURE — 85027 COMPLETE CBC AUTOMATED: CPT | Performed by: STUDENT IN AN ORGANIZED HEALTH CARE EDUCATION/TRAINING PROGRAM

## 2024-10-22 PROCEDURE — 2500000001 HC RX 250 WO HCPCS SELF ADMINISTERED DRUGS (ALT 637 FOR MEDICARE OP): Performed by: INTERNAL MEDICINE

## 2024-10-22 PROCEDURE — 2500000004 HC RX 250 GENERAL PHARMACY W/ HCPCS (ALT 636 FOR OP/ED): Performed by: STUDENT IN AN ORGANIZED HEALTH CARE EDUCATION/TRAINING PROGRAM

## 2024-10-22 PROCEDURE — 83735 ASSAY OF MAGNESIUM: CPT | Performed by: STUDENT IN AN ORGANIZED HEALTH CARE EDUCATION/TRAINING PROGRAM

## 2024-10-22 PROCEDURE — 2500000001 HC RX 250 WO HCPCS SELF ADMINISTERED DRUGS (ALT 637 FOR MEDICARE OP): Performed by: NURSE PRACTITIONER

## 2024-10-22 PROCEDURE — 2550000001 HC RX 255 CONTRASTS: Performed by: STUDENT IN AN ORGANIZED HEALTH CARE EDUCATION/TRAINING PROGRAM

## 2024-10-22 PROCEDURE — 80053 COMPREHEN METABOLIC PANEL: CPT | Performed by: STUDENT IN AN ORGANIZED HEALTH CARE EDUCATION/TRAINING PROGRAM

## 2024-10-22 PROCEDURE — 97162 PT EVAL MOD COMPLEX 30 MIN: CPT | Mod: GP

## 2024-10-22 PROCEDURE — 99232 SBSQ HOSP IP/OBS MODERATE 35: CPT | Performed by: NURSE PRACTITIONER

## 2024-10-22 PROCEDURE — 99407 BEHAV CHNG SMOKING > 10 MIN: CPT | Performed by: INTERNAL MEDICINE

## 2024-10-22 PROCEDURE — A9503 TC99M MEDRONATE: HCPCS | Performed by: INTERNAL MEDICINE

## 2024-10-22 PROCEDURE — 99233 SBSQ HOSP IP/OBS HIGH 50: CPT | Performed by: STUDENT IN AN ORGANIZED HEALTH CARE EDUCATION/TRAINING PROGRAM

## 2024-10-22 PROCEDURE — 70553 MRI BRAIN STEM W/O & W/DYE: CPT | Performed by: RADIOLOGY

## 2024-10-22 PROCEDURE — 9420000001 HC RT PATIENT EDUCATION 5 MIN

## 2024-10-22 PROCEDURE — 2500000002 HC RX 250 W HCPCS SELF ADMINISTERED DRUGS (ALT 637 FOR MEDICARE OP, ALT 636 FOR OP/ED): Performed by: INTERNAL MEDICINE

## 2024-10-22 PROCEDURE — 78306 BONE IMAGING WHOLE BODY: CPT | Performed by: NUCLEAR MEDICINE

## 2024-10-22 PROCEDURE — 94640 AIRWAY INHALATION TREATMENT: CPT

## 2024-10-22 PROCEDURE — 85610 PROTHROMBIN TIME: CPT | Performed by: STUDENT IN AN ORGANIZED HEALTH CARE EDUCATION/TRAINING PROGRAM

## 2024-10-22 PROCEDURE — 1200000002 HC GENERAL ROOM WITH TELEMETRY DAILY

## 2024-10-22 PROCEDURE — 85730 THROMBOPLASTIN TIME PARTIAL: CPT | Performed by: STUDENT IN AN ORGANIZED HEALTH CARE EDUCATION/TRAINING PROGRAM

## 2024-10-22 PROCEDURE — 97166 OT EVAL MOD COMPLEX 45 MIN: CPT | Mod: GO

## 2024-10-22 PROCEDURE — 99232 SBSQ HOSP IP/OBS MODERATE 35: CPT | Performed by: INTERNAL MEDICINE

## 2024-10-22 PROCEDURE — 70553 MRI BRAIN STEM W/O & W/DYE: CPT

## 2024-10-22 PROCEDURE — 2500000001 HC RX 250 WO HCPCS SELF ADMINISTERED DRUGS (ALT 637 FOR MEDICARE OP): Performed by: STUDENT IN AN ORGANIZED HEALTH CARE EDUCATION/TRAINING PROGRAM

## 2024-10-22 PROCEDURE — 36415 COLL VENOUS BLD VENIPUNCTURE: CPT | Performed by: STUDENT IN AN ORGANIZED HEALTH CARE EDUCATION/TRAINING PROGRAM

## 2024-10-22 PROCEDURE — 99233 SBSQ HOSP IP/OBS HIGH 50: CPT | Performed by: INTERNAL MEDICINE

## 2024-10-22 PROCEDURE — 78306 BONE IMAGING WHOLE BODY: CPT

## 2024-10-22 PROCEDURE — 3430000001 HC RX 343 DIAGNOSTIC RADIOPHARMACEUTICALS: Performed by: INTERNAL MEDICINE

## 2024-10-22 PROCEDURE — 82947 ASSAY GLUCOSE BLOOD QUANT: CPT

## 2024-10-22 PROCEDURE — A9575 INJ GADOTERATE MEGLUMI 0.1ML: HCPCS | Performed by: STUDENT IN AN ORGANIZED HEALTH CARE EDUCATION/TRAINING PROGRAM

## 2024-10-22 RX ORDER — LORAZEPAM 2 MG/ML
0.5 INJECTION INTRAMUSCULAR
Status: COMPLETED | OUTPATIENT
Start: 2024-10-22 | End: 2024-10-22

## 2024-10-22 RX ORDER — LORAZEPAM 2 MG/ML
0.5 INJECTION INTRAMUSCULAR
Status: ACTIVE | OUTPATIENT
Start: 2024-10-22 | End: 2024-10-23

## 2024-10-22 RX ORDER — POTASSIUM CHLORIDE 20 MEQ/1
40 TABLET, EXTENDED RELEASE ORAL ONCE
Status: COMPLETED | OUTPATIENT
Start: 2024-10-22 | End: 2024-10-22

## 2024-10-22 RX ORDER — GADOTERATE MEGLUMINE 376.9 MG/ML
16 INJECTION INTRAVENOUS
Status: COMPLETED | OUTPATIENT
Start: 2024-10-22 | End: 2024-10-22

## 2024-10-22 ASSESSMENT — COGNITIVE AND FUNCTIONAL STATUS - GENERAL
MOBILITY SCORE: 14
STANDING UP FROM CHAIR USING ARMS: A LITTLE
STANDING UP FROM CHAIR USING ARMS: TOTAL
DRESSING REGULAR UPPER BODY CLOTHING: A LITTLE
EATING MEALS: A LITTLE
PERSONAL GROOMING: A LITTLE
TURNING FROM BACK TO SIDE WHILE IN FLAT BAD: A LOT
MOVING FROM LYING ON BACK TO SITTING ON SIDE OF FLAT BED WITH BEDRAILS: A LOT
DRESSING REGULAR LOWER BODY CLOTHING: A LITTLE
MOVING TO AND FROM BED TO CHAIR: A LITTLE
MOBILITY SCORE: 8
PERSONAL GROOMING: A LOT
DRESSING REGULAR LOWER BODY CLOTHING: A LITTLE
MOVING FROM LYING ON BACK TO SITTING ON SIDE OF FLAT BED WITH BEDRAILS: A LITTLE
EATING MEALS: A LITTLE
DRESSING REGULAR LOWER BODY CLOTHING: A LOT
CLIMB 3 TO 5 STEPS WITH RAILING: TOTAL
TOILETING: TOTAL
MOBILITY SCORE: 14
CLIMB 3 TO 5 STEPS WITH RAILING: TOTAL
MOVING FROM LYING ON BACK TO SITTING ON SIDE OF FLAT BED WITH BEDRAILS: A LITTLE
DAILY ACTIVITIY SCORE: 18
PERSONAL GROOMING: A LITTLE
CLIMB 3 TO 5 STEPS WITH RAILING: TOTAL
STANDING UP FROM CHAIR USING ARMS: A LITTLE
WALKING IN HOSPITAL ROOM: TOTAL
MOVING TO AND FROM BED TO CHAIR: TOTAL
TURNING FROM BACK TO SIDE WHILE IN FLAT BAD: A LITTLE
HELP NEEDED FOR BATHING: A LITTLE
HELP NEEDED FOR BATHING: A LOT
WALKING IN HOSPITAL ROOM: TOTAL
EATING MEALS: A LITTLE
DRESSING REGULAR UPPER BODY CLOTHING: A LITTLE
WALKING IN HOSPITAL ROOM: TOTAL
TURNING FROM BACK TO SIDE WHILE IN FLAT BAD: A LITTLE
HELP NEEDED FOR BATHING: A LITTLE
TOILETING: A LITTLE
DRESSING REGULAR UPPER BODY CLOTHING: A LOT
MOVING TO AND FROM BED TO CHAIR: A LITTLE
DAILY ACTIVITIY SCORE: 12
DAILY ACTIVITIY SCORE: 18
TOILETING: A LITTLE

## 2024-10-22 ASSESSMENT — PAIN SCALES - GENERAL
PAINLEVEL_OUTOF10: 3
PAINLEVEL_OUTOF10: 0 - NO PAIN
PAINLEVEL_OUTOF10: 5 - MODERATE PAIN
PAINLEVEL_OUTOF10: 0 - NO PAIN
PAINLEVEL_OUTOF10: 0 - NO PAIN

## 2024-10-22 ASSESSMENT — ACTIVITIES OF DAILY LIVING (ADL)
BATHING_ASSISTANCE: MAXIMAL
ADL_ASSISTANCE: INDEPENDENT

## 2024-10-22 ASSESSMENT — PAIN - FUNCTIONAL ASSESSMENT
PAIN_FUNCTIONAL_ASSESSMENT: 0-10
PAIN_FUNCTIONAL_ASSESSMENT: 0-10
PAIN_FUNCTIONAL_ASSESSMENT: FLACC (FACE, LEGS, ACTIVITY, CRY, CONSOLABILITY)
PAIN_FUNCTIONAL_ASSESSMENT: 0-10

## 2024-10-22 ASSESSMENT — PAIN DESCRIPTION - DESCRIPTORS: DESCRIPTORS: DULL;DISCOMFORT

## 2024-10-22 NOTE — PROGRESS NOTES
Subjective Data:  Patient with underlying lung cancer.  Now with a left atrial thrombus.  Currently on IV heparin protocol.  Patient started on a p.o. warfarin due to underlying LV thrombus more likely than metastasis from a cancer.  Discussed in length with the patient about keeping INR between 2 and 3.  Getting PT/INR check frequently.  Pending rehab placement.  Overnight Events:    None  Objective   Last Recorded Vitals  BP (!) 120/40 (BP Location: Left arm, Patient Position: Lying)   Pulse 89   Temp 37.3 °C (99.1 °F) (Temporal)   Resp 18   Wt 75.8 kg (167 lb 1.7 oz)   SpO2 95%     Intake/Output Summary (Last 24 hours) at 10/22/2024 1129  Last data filed at 10/22/2024 0856  Gross per 24 hour   Intake 930 ml   Output 950 ml   Net -20 ml     Physical Exam:  HEENT: Normocephalic/atraumatic pupils equal react light  Neck exam mild JVD, no bruit  Lung exam diffuse rhonchi's, few crackles at the bases  Cardiac exam is regular rhythm S1-S2, soft systolic murmur heard.   Abdomen soft nontender, nondistended  Extremities no clubbing, cyanosis, trace edema  Neuro exam grossly intact.  Image Results  Transthoracic Echo (TTE) [place] [address]             Phone [phone]    TRANSTHORACIC ECHOCARDIOGRAM REPORT    Patient Name:     ZION Mayer Physician:   [id number] Konrad Baker MD  Study Date:       10/21/2024           Ordering Provider:   [id number] GEIGER  MRN/PID:          [id number]             Fellow:  Accession#:       [id number]         Nurse:  Date of           1945 / 79 years  Sonographer:         Stacey Funes CAMILO  Birth/Age:  Gender:           F                    Additional Staff:  Height:           170.18 cm            Admit Date:  Weight:           75.75 kg             Admission Status:    Inpatient - Routine  BSA / BMI:        1.87 m2 / 26.16       Department Location: [facility]                    kg/m2  Blood Pressure: 116 /67 mmHg    Study Type:    TRANSTHORACIC ECHO (TTE) COMPLETE  Diagnosis/ICD: Intracardiac thrombosis, not elsewhere classified-I51.3  Indication:    mass in left atrium, and left ventricle  CPT Codes:     Echo Complete w Full [id number]    Patient History:  Pertinent History: DM, metastic disease, LA mass, sinus tachy, elevated                     troponin, SOB.    Study Detail: The following Echo studies were performed: 2D, M-Mode, Doppler and                color flow. Definity used as a contrast agent for endocardial                border definition. Total contrast used for this procedure was 2 mL                via IV push.       Critical Event  Critical Event: Test was completed as per department protocol.  Critical Finding: Mass in the left atrium.  Time Test was Completed: 1:35:05 PM  Notified: DR. Baker.  Attending notification time: 1:41:00 AM     PHYSICIAN INTERPRETATION:  Left Ventricle: Left ventricular ejection fraction is normal, by visual estimate at 55-60%. There are no regional wall motion abnormalities. The left ventricular cavity size is normal. There is mild concentric left ventricular hypertrophy involving the basal wall and septal wall. Spectral Doppler shows an impaired relaxation pattern of left ventricular diastolic filling.  Left Atrium: The left atrium is upper limits of normal in size. There is a moderately sized fixed left atrial thrombus The left atrial thrombus is spherical and irregular in shape. More likely thrombus than metastatic tumor in the left atrium.  Right Ventricle: The right ventricle is normal in size. There is normal right ventricular global systolic function.  Right Atrium: The right atrium is upper limits of normal in size. Possible small thrombus in the right atrium.  Aortic Valve: The aortic valve is trileaflet. There is evidence of mildly elevated transaortic gradients consistent  with sclerosis of the aortic valve.  The aortic valve dimensionless index is 0.90. There is trace aortic valve regurgitation. The peak instantaneous gradient of the aortic valve is 6.0 mmHg. The mean gradient of the aortic valve is 3.3 mmHg.  Mitral Valve: The mitral valve is normal in structure. There is trace mitral valve regurgitation.  Tricuspid Valve: The tricuspid valve is structurally normal. There is trace to mild tricuspid regurgitation.  Pulmonic Valve: The pulmonic valve is not well visualized. There is trace pulmonic valve regurgitation.  Pericardium: No pericardial effusion noted.  Aorta: The aortic root is normal.       CONCLUSIONS:   1. Left ventricular ejection fraction is normal, by visual estimate at 55-60%.   2. Spectral Doppler shows an impaired relaxation pattern of left ventricular diastolic filling.   3. There is normal right ventricular global systolic function.   4. Moderately sized left atrial thrombus.   5. Aortic valve sclerosis.   6. More likely thrombus than metastatic tumor in the left atrium.    QUANTITATIVE DATA SUMMARY:     2D MEASUREMENTS:           Normal Ranges:  LAs:             4.55 cm   (2.7-4.0cm)  IVSd:            1.06 cm   (0.6-1.1cm)  LVPWd:           0.72 cm   (0.6-1.1cm)  LVIDd:           3.91 cm   (3.9-5.9cm)  LVIDs:           2.85 cm  LV Mass Index:   55.7 g/m2  LV % FS          27.2 %       LA VOLUME:                    Normal Ranges:  LA Vol A4C:        48.6 ml    (22+/-6mL/m2)  LA Vol A2C:        40.0 ml  LA Vol BP:         46.2 ml  LA Vol Index A4C:  26.0 ml/m2  LA Vol Index A2C:  21.4 ml/m2  LA Vol Index BP:   24.7 ml/m2  LA Area A4C:       17.9 cm2  LA Area A2C:       15.5 cm2  LA Major Axis A4C: 5.6 cm  LA Major Axis A2C: 5.1 cm  LA Volume Index:   24.7 ml/m2  LA Vol A4C:        48.0 ml  LA Vol A2C:        40.0 ml  LA Vol Index BSA:  23.5 ml/m2       RA VOLUME BY A/L METHOD:            Normal Ranges:  RA Vol A4C:              42.3 ml    (8.3-19.5ml)  RA Vol  Index A4C:        22.6 ml/m2  RA Area A4C:             16.7 cm2  RA Major Axis A4C:       5.6 cm       M-MODE MEASUREMENTS:         Normal Ranges:  LAs:                 4.20 cm (2.7-4.0cm)       AORTA MEASUREMENTS:         Normal Ranges:  Ao Sinus, d:        3.20 cm (2.1-3.5cm)  Ao STJ, d:          3.00 cm (1.7-3.4cm)  Asc Ao, d:          3.10 cm (2.1-3.4cm)       LV SYSTOLIC FUNCTION BY 2D PLANIMETRY (MOD):                       Normal Ranges:  EF-A4C View:    54 % (>=55%)  EF-A2C View:    60 %  EF-Biplane:     58 %  EF-Visual:      58 %  LV EF Reported: 58 %       LV DIASTOLIC FUNCTION:           Normal Ranges:  MV Peak E:             0.74 m/s  (0.7-1.2 m/s)  MV Peak A:             0.81 m/s  (0.42-0.7 m/s)  E/A Ratio:             0.92      (1.0-2.2)  MV e'                  0.080 m/s (>8.0)  MV lateral e'          0.09 m/s  MV medial e'           0.07 m/s  E/e' Ratio:            9.18      (<8.0)       MITRAL VALVE:          Normal Ranges:  MV DT:        274 msec (150-240msec)       AORTIC VALVE:                     Normal Ranges:  AoV Vmax:                1.22 m/s (<=1.7m/s)  AoV Peak P.0 mmHg (<20mmHg)  AoV Mean PG:             3.3 mmHg (1.7-11.5mmHg)  LVOT Max Jeancarlos:            1.08 m/s (<=1.1m/s)  AoV VTI:                 21.74 cm (18-25cm)  LVOT VTI:                19.64 cm  LVOT Diameter:           2.24 cm  (1.8-2.4cm)  AoV Area, VTI:           3.57 cm2 (2.5-5.5cm2)  AoV Area,Vmax:           3.48 cm2 (2.5-4.5cm2)  AoV Dimensionless Index: 0.90       RIGHT VENTRICLE:  RV Basal 4.05 cm  RV Major 6.3 cm  TAPSE:   23.0 mm  RV s'    0.16 m/s       TRICUSPID VALVE/RVSP:          Normal Ranges:  Peak TR Velocity:     2.20 m/s  RV Syst Pressure:     22 mmHg  (< 30mmHg)  IVC Diam:             1.75 cm       AORTA:  Asc Ao Diam 3.13 cm       [id number] Konrad Baker MD  Electronically signed on 10/22/2024 at 8:07:52 AM       ** Final **    Last Labs:  CBC - 10/21/2024:  7:40 AM  9.7 10.4 357    31.8      CMP -  10/22/2024:  4:17 AM  8.7 6.5 10 --- 0.4   _ 2.6 <3 39      PTT - 10/22/2024: 10:32 AM  1.1   11.6 65.2     Inpatient Medications:  Scheduled medications   Medication Dose Route Frequency    ampicillin-sulbactam  3 g intravenous q6h    budesonide  0.5 mg nebulization BID    fluticasone  2 spray Each Nostril Daily    folic acid  1 mg oral Daily    formoterol  20 mcg nebulization BID    insulin lispro  0-10 Units subcutaneous TID    levothyroxine  150 mcg oral Daily    metoprolol succinate XL  50 mg oral BID    pantoprazole  40 mg intravenous BID    polyethylene glycol  17 g oral Daily    potassium chloride CR  20 mEq oral Daily    warfarin  5 mg oral Daily     Principal Problem:    Nausea and vomiting, unspecified vomiting type  Active Problems:    Tobacco use    Mass of upper lobe of right lung    Hilar mass    Metastatic disease (Multi)    Endometrial hyperplasia    Hypokalemia    Hyponatremia    Hypomagnesemia    Lactic acidosis    Elevated troponin    Type 2 diabetes mellitus    Acute anemia    Prolonged Q-T interval on ECG    Sinus tachycardia    Adrenal nodule    Left atrial mass    Hypothyroidism    Assessment/Plan   79-year-old patient with underlying multiple comorbid condition now with normal ejection fraction and moderate size left atrial thrombus about 3 x 3 cm.  Patient currently on anticoagulation.  1.  Left atrial mass: More likely thrombus than secondary metastatic malignancy tumor on the left atrium.  Continue anticoagulation with IV heparin and Coumadin.  Goal to keep INR between 2 and 3.  2.  Diabetes: Optimize glycemic control.  3.  Smoking cessation:Smoking cessation counseling was performed.  The patient was counseled on the harms of smoking, including increased risk for lung disease, cardiovascular disease and cancer. In  the context of the disease, smoking is associated with a greater pain, worse joint damage, and worse response to biological therapy.  About 11 to 15 minute on smoking  cessation and counseling to patient.  4.  Hypertension: Patient currently on Toprol-XL 50 mg tablet p.o. twice daily.  5.  Prophylaxis: DVT, aspiration and fall precautions are taken.  Patient currently on IV heparin as well as Coumadin..    Pt. care time is spent includes review of diagnostic tests, labs, radiographs, EKGs, old echoes, cardiac work-up and coordination of care. Assessment, impression and plans are reflected in the note above as well as the orders.    Code Status:  DNR and No Intubation and No ICU  I spent 35 minutes in the professional and overall care of this patient.  Konrad Baker MD

## 2024-10-22 NOTE — PROGRESS NOTES
Occupational Therapy    Evaluation    Patient Name: Dian Perez  MRN: [id number]  Department: [facility]  Room: 03/03-A  Today's Date: 10/22/2024  Time Calculation  Start Time: 1022  Stop Time: 1048  Time Calculation (min): 26 min        Assessment:  OT Assessment: pt presents with lethargy, generalized weakness, reduced strength/trunk control and poor activity tolerance which impedes ADL performance. pt would benefit from skilled OT services to address these deficits and to facilitate highest level of independence.  Prognosis: Fair  Barriers to Discharge: Decreased caregiver support, Inaccessible home environment  Evaluation/Treatment Tolerance: Patient limited by fatigue  Medical Staff Made Aware: Yes  End of Session Communication: Bedside nurse  End of Session Patient Position: Bed, 3 rail up, Alarm on  OT Assessment Results: Decreased ADL status, Decreased cognition, Decreased endurance, Decreased functional mobility, Decreased upper extremity range of motion, Decreased upper extremity strength, Decreased trunk control for functional activities  Prognosis: Fair  Barriers to Discharge: Decreased caregiver support, Inaccessible home environment  Evaluation/Treatment Tolerance: Patient limited by fatigue  Medical Staff Made Aware: Yes  Strengths: Attitude of self  Barriers to Participation: Ability to acquire knowledge, Comorbidities  Plan:  Treatment Interventions: ADL retraining, Functional transfer training, UE strengthening/ROM, Endurance training, Equipment evaluation/education, Compensatory technique education  OT Frequency: 3 times per week  OT Discharge Recommendations: Moderate intensity level of continued care  Equipment Recommended upon Discharge: Wheeled walker, Bedside commode  OT Recommended Transfer Status: Assist of 2, Moderate assist  OT - OK to Discharge: Yes  Treatment Interventions: ADL retraining, Functional transfer training, UE strengthening/ROM, Endurance training, Equipment  evaluation/education, Compensatory technique education    Subjective     General:  General  Reason for Referral: ADL impairment; 79 yr old female presenting with lung mass and CAT scan done found to having a possible left ventricular thrombus.  Past Medical History Relevant to Rehab: metastatic lung cancer. Arthritis, Diabetes mellitus (Multi), Disease of thyroid gland, and Hypertension.  Family/Caregiver Present: No  Co-Treatment: PT  Co-Treatment Reason: to maximize safety and participation; pt with poor activity tolerance and lethargic throughout session  Prior to Session Communication: Bedside nurse, Physician (MD sent message via chat stating okay to see patient for OT/PT; pt on coagulation)  Patient Position Received: Bed, 3 rail up, Alarm on  Preferred Learning Style: verbal, visual  General Comment: pt agreeable with therapy however lethargic and difficulty maintaining alertness throughout session.  extended time to complete tasks due to fatigue  Precautions:  Medical Precautions: Fall precautions    Vital Sign (Past 2hrs)        Date/Time Vitals Session Patient Position Pulse Resp SpO2 BP MAP (mmHg)    10/22/24 1022 During OT  --  94  --  92 %  --  --                         Pain:  Pain Assessment  Pain Assessment: 0-10  0-10 (Numeric) Pain Score: 3  Pain Type: Acute pain  Pain Location: Chest    Objective   Cognition:  Overall Cognitive Status: Impaired  Arousal/Alertness: Delayed responses to stimuli  Orientation Level: Oriented X4  Following Commands: Follows one step commands with increased time  Cognition Comments: flat affect, delayed processing, lethargic.  poor safety awareness with PLOF/home set up           Home Living:  Type of Home: House  Lives With: Alone  Home Adaptive Equipment:  (rollator)  Home Layout: Two level  Home Access: Stairs to enter with rails  Entrance Stairs-Rails:  (single)  Entrance Stairs-Number of Steps: 6  Bathroom Equipment: Bedside commode  Home Living Comments: pt stays  on first floor, uses bedside commode placed next to bed. 1/2 bath on first floor, full bath on second floor however pt does not go up there  Prior Function:  Level of [place]: Independent with ADLs and functional transfers, Needs assistance with homemaking  Receives Help From: Family (grandchildren live nearby however have limited availability to assist)  ADL Assistance: Independent  Homemaking Assistance: Needs assistance  Ambulatory Assistance: Independent (able to ambulate short distances with rollator, able to transfer independently to bedside commode.)  Prior Function Comments: pt reporting only able to ambulate short distances with rollator and stays on first floor.  pt uses bedside commode but is able to empty bucket in bathroom on first floor with rollator.  pt states she is able to ambulate to kitchen x1 a day to retrieve food for the day then spends the rest of the day in bed.  pt has been sponge bathing at bed level.     ADL:  Eating Assistance: Minimal (anticipated)  Grooming Assistance: Moderate (anticipated)  Bathing Assistance: Maximal (anticipated)  UE Dressing Assistance: Maximal (anticipated)  LE Dressing Assistance: Maximal (anticipated)  Toileting Assistance with Device: Total (anticipated)  ADL Comments: Scores based off clinical observation and performance; pt with significant lethargy and did not want to participate in ADLs at this time  Activity Tolerance:  Endurance: Decreased tolerance for upright activites  Bed Mobility/Transfers: Bed Mobility  Bed Mobility: Yes  Bed Mobility 1  Bed Mobility 1: Rolling right, Rolling left  Level of Assistance 1: Moderate assistance (x1)  Bed Mobility Comments 1: with use of bed rails; performed rolling in order to adjust bedding/chux  Bed Mobility 2  Bed Mobility  2: Scooting  Level of Assistance 2: Moderate assistance  Bed Mobility Comments 2: lateral scooting while seated EOB towards left side to position self closer to head of bed  Bed Mobility  3  Bed Mobility 3: Supine sitting  Level of Assistance 3: Moderate assistance (x1)  Bed Mobility Comments 3: assist for managing BLE and for lifting trunk; effortful and extended time to complete  Bed Mobility 4  Bed Mobility 4: Sitting to supine  Level of Assistance 4: Moderate assistance  Bed Mobility Comments 4: assist for lifting BLE back into bed; pt able to manage trunk w/o assist; effortful    Transfers  Transfer: No (pt refusing and significantly lethargic on this date; deferred at this time due to difficulty maintaining arousal even in sitting)    Sitting Balance:  Static Sitting Balance  Static Sitting-Balance Support: Feet supported, Bilateral upper extremity supported  Static Sitting-Level of Assistance: Minimum assistance  Static Sitting-Comment/Number of Minutes: tolerated sitting EOB for ~2 minutes total; requiring MIN A for maintaining trunk in midline position.  pt with significant lethargy and difficulty maintaining alertness      Vision:Vision - Basic Assessment  Current Vision: Wears glasses only for reading  Sensation:  Light Touch: No apparent deficits  Strength:  Strength Comments: Proximal BUE 3-/5, distally 3+/5  Perception:  Initiation: Cues to initiate tasks  Coordination:  Movements are Fluid and Coordinated: No  Upper Body Coordination: decreased rate/accuracy of movements   Hand Function:  Gross Grasp: Functional  Coordination: Functional  Extremities: RUE   RUE :  (Shoulder flexion ~90 degrees, WFL distally) and LUE   LUE:  (Shoulder flexion ~90 degrees, WFL distally)      Outcome Measures:[facility] Daily Activity  Putting on and taking off regular lower body clothing: A lot  Bathing (including washing, rinsing, drying): A lot  Putting on and taking off regular upper body clothing: A lot  Toileting, which includes using toilet, bedpan or urinal: Total  Taking care of personal grooming such as brushing teeth: A lot  Eating Meals: A little  Daily Activity - Total Score: 12        Education  Documentation  Body Mechanics, taught by Wilian Ledesma OT at 10/22/2024 11:15 AM.  Learner: Patient  Readiness: Acceptance  Method: Explanation, Demonstration  Response: Needs Reinforcement    ADL Training, taught by Wilian Ledesma OT at 10/22/2024 11:15 AM.  Learner: Patient  Readiness: Acceptance  Method: Explanation, Demonstration  Response: Needs Reinforcement    Education Comments  No comments found.        OP EDUCATION:       Goals:  Encounter Problems       Encounter Problems (Active)       ADLs       Patient with complete upper body dressing with minimal assist  level of assistance donning and doffing all UE clothes  while edge of bed  (Progressing)       Start:  10/22/24    Expected End:  11/22/24            Patient with complete lower body dressing with minimal assist  level of assistance donning and doffing all LE clothes  with PRN adaptive equipment while edge of bed  (Progressing)       Start:  10/22/24    Expected End:  11/22/24            Patient will complete daily grooming tasks with stand by assist level of assistance and PRN adaptive equipment while edge of bed . (Progressing)       Start:  10/22/24    Expected End:  11/22/24            Patient will complete toileting including hygiene clothing management/hygiene with minimal assist  level of assistance and bedside commode. (Progressing)       Start:  10/22/24    Expected End:  11/22/24               TRANSFERS       Patient will perform bed mobility minimal assist  level of assistance and bed rails in order to improve safety and independence with mobility (Progressing)       Start:  10/22/24    Expected End:  11/22/24            Patient will complete functional transfer to toilet/commode with least restrictive device with minimal assist  level of assistance. (Progressing)       Start:  10/22/24    Expected End:  11/22/24

## 2024-10-22 NOTE — NURSING NOTE
Patient left floor for NM study.  Daughter at bedside, requesting to speak with hospitalist,.  Dr Lane aware.

## 2024-10-22 NOTE — PROGRESS NOTES
Occupational Therapy                 Therapy Communication Note    Patient Name: Dian Perez  MRN: [id number]  Department: [facility] 3 E  Room: 03/03-A  Today's Date: 10/22/2024     Discipline: Occupational Therapy    Missed Visit Reason: Missed Visit Reason: Cancel (waiting for Cardiology to confirm status of possible thrombus left atrium/ventricle and plan of care today.  Current PTT is therapeutic at 50.4. Cancel at this time.)    Missed Time: Cancel    Comment:

## 2024-10-22 NOTE — PROGRESS NOTES
Physical Therapy    Physical Therapy Evaluation    Patient Name: Dian Perez  MRN: [id number]  Department: [facility]  Room: 03/03-A  Today's Date: 10/22/2024   Time Calculation  Start Time: 1025  Stop Time: 1048  Time Calculation (min): 23 min    Assessment/Plan   PT Assessment  PT Assessment Results: Decreased strength, Decreased endurance, Impaired balance, Decreased mobility, Decreased coordination, Decreased safety awareness, Impaired hearing, Obesity  Rehab Prognosis: Good  Barriers to Discharge: weakness; high fall risk; lives alone  Evaluation/Treatment Tolerance: Patient limited by fatigue  Medical Staff Made Aware: Yes  Strengths: Ability to acquire knowledge, Housing layout  Barriers to Participation: Comorbidities, Support of extended family/friends  End of Session Communication: Bedside nurse  Assessment Comment: pt required mod assist of 1 for the above limited mobility; pt demonstrates decreased strength, balance, endurance, mobility ease and safety, + high fall risk. Pt is NOT safe for home d/c. Pallative care involved; Pt ewill benefit from continued skilled PT services while in hospital and mod int rehab at this point after hospital d/c.  End of Session Patient Position: Bed, 3 rail up, Alarm on (call button in reach)  IP OR SWING BED PT PLAN  Inpatient or Swing Bed: Inpatient  PT Plan  Treatment/Interventions: Bed mobility, Transfer training, Gait training, Balance training, Strengthening, Endurance training, Therapeutic exercise, Therapeutic activity  PT Plan: Ongoing PT  PT Frequency: 4 times per week  PT Discharge Recommendations: Moderate intensity level of continued care  Equipment Recommended upon Discharge: Wheeled walker, Bedside commode  PT Recommended Transfer Status: Assist x2, Assistive device (FWW)  PT - OK to Discharge: Yes    Subjective   General Visit Information:  General  Reason for Referral: imapired mobility; + fall, weakness  Referred By: Chet Beal MD  Past Medical History  "Relevant to Rehab: metastatic lung CA, TOB abuse, HTN, DM, OA  Missed Visit: Yes  Missed Visit Reason: Other (Comment) (waiting for Cardiology to confirm status of possible thrombus left atrium/ventricle and plan of care today.  Current PTT is therapeutic at 50.4. Cancel at this time.)  Family/Caregiver Present: No  Co-Treatment: OT  Co-Treatment Reason: limited mobility tolerance; maximize pt safety  Prior to Session Communication: Bedside nurse  Patient Position Received: Alarm on, Bed, 3 rail up  Preferred Learning Style: verbal, visual  General Comment: 78 yo WF admitted to [facility] via ED 10/20/24 with c/o N/V/weakness, fall ooff of commode. CT Scan of chest revealed metastatic lung dz with + thrombus left atrium/ventricle. Cardiology cleared pt for PT mobilization; PTT 50.4. Cleared by nurse for therapy; pt agreeable to therapy \" as tolerates due to extreme fatigue.\"  Home Living:  Home Living  Type of Home: House  Lives With: Alone  Home Adaptive Equipment: Walker rolling or standard, Other (Comment) (rollator; BS)  Home Layout: Two level (pt stays on main floor)  Home Access: Stairs to enter with rails  Entrance Stairs-Rails:  (unilateral)  Entrance Stairs-Number of Steps: 6  Bathroom Shower/Tub:  (1/2 bath on main floor; unable to use 2nd floor full bath)  Bathroom Toilet: Standard  Bathroom Accessibility: 1/2 bath main floor  Prior Level of Function:  Prior Function Per Pt/Caregiver Report  Level of [place]: Independent with ADLs and functional transfers, Needs assistance with homemaking  Receives Help From: Family (pt reports daughter and grandson assist with grocery shopping but otherwise they work full time and provide very limited assistance)  ADL Assistance:  (pt reports independent but question hiw often she spongebathes)  Homemaking Assistance:  (family assists with groceries, laundry every few months per pt??)  Ambulatory Assistance: Independent (rollator short household distances)  Vocational: " "Retired  Prior Function Comments: pt reports independent with meds; at least 1 fall in past 6 mos---pt reports \" I only go from bed to/from BSC most of the time. I go into kitchen once per day. \"  Precautions:  Precautions  Hearing/Visual Limitations: reading glasses; mild [place]  Medical Precautions: Fall precautions  Precautions Comment: + metastatic lung CA----pt to undetgo bone scan this admission     Vital Signs (Past 2hrs)        Date/Time Vitals Session Patient Position Pulse Resp SpO2 BP MAP (mmHg)    10/22/24 1022 During OT  --  94  --  92 %  --  --     10/22/24 1025 Pre PT  Lying  89  --  95 %  --  --                   Vital Signs Comment: O2 sat dropped to 89% with sititng activities; Recovered to 93% with HR 89 bpm after left sidelying x 1 min.     Objective   Pain:  Pain Assessment  Pain Assessment: 0-10  0-10 (Numeric) Pain Score: 0 - No pain (pt c/o mild chest pressure)  Cognition:  Cognition  Overall Cognitive Status: Within Functional Limits  Orientation Level: Oriented X4  Safety/Judgement:  (decreased safety insight during functuional mobility)  Insight: Mild  Processing Speed: Delayed    General Assessments:  General Observation  General Observation: pleasant, slow to respond, fatigued, muscle atrophy bilateral LE's, cooperative               Activity Tolerance  Endurance: Decreased tolerance for upright activites  Activity Tolerance Comments: poor due to fatigue/weakness    Sensation  Light Touch: No apparent deficits    Strength  Strength Comments: bilateral hips 2/4, knees 3-/5, ankles 3-/5  Coordination  Movements are Fluid and Coordinated: No  Lower Body Coordination: decreased bilateral LE's due to weakness    Postural Control  Posture Comment: obese with mild to mod forward head, held in mod right sidebending while sititng edge of bed due to weakness    Static Sitting Balance  Static Sitting-Balance Support: Feet supported, Bilateral upper extremity supported  Static Sitting-Level of " Assistance: Minimum assistance  Static Sitting-Comment/Number of Minutes: 2-3 min-----fair - balance at best  Dynamic Sitting Balance  Dynamic Sitting-Balance Support: Feet supported, Bilateral upper extremity supported  Dynamic Sitting-Level of Assistance: Moderate assistance  Dynamic Sitting-Comments: poor during seated scooting    Static Standing Balance  Static Standing-Comment/Number of Minutes: unable due to weakness  Dynamic Standing Balance  Dynamic Standing-Comments: unable due to weakness  Functional Assessments:  Bed Mobility  Bed Mobility: Yes  Bed Mobility 1  Bed Mobility 1: Rolling right, Rolling left  Level of Assistance 1: Moderate assistance, Minimal verbal cues  Bed Mobility Comments 1: with use of bedrail, verbal cues for ease of transition  Bed Mobility 2  Bed Mobility  2: Scooting  Level of Assistance 2: Moderate assistance, Minimal verbal cues  Bed Mobility Comments 2: seated scooting to left with mod assist of 1, verbasl cues for ease of transition  Bed Mobility 3  Bed Mobility 3: Supine sitting  Level of Assistance 3: Moderate assistance, Minimal verbal cues  Bed Mobility Comments 3: head of bed flat; mod assist of 1 for trunk up, scooting hips to edge of bed, verbal cues for active use of augusto UE's and weight shifting  Bed Mobility 4  Bed Mobility 4: Sitting to supine  Level of Assistance 4: Moderate assistance  Bed Mobility Comments 4: head of bed flat; mod assist for augusto LE's onto bed    Transfers  Transfer: No (unsafe/unable)    Ambulation/Gait Training  Ambulation/Gait Training Performed: No (unsafe/unable)    Stairs  Stairs: No (unsafe/unable)  Extremity/Trunk Assessments:  Cervical Spine   Cervical Spine:  (mod forward head; unable to hold head erect in sititng---mod right sidebending)  RLE   RLE :  (see above strength comments)  LLE   LLE :  (see above strength comments)  Outcome Measures:  [facility] Basic Mobility  Turning from your back to your side while in a flat bed without using  bedrails: A lot  Moving from lying on your back to sitting on the side of a flat bed without using bedrails: A lot  Moving to and from bed to chair (including a wheelchair): Total  Standing up from a chair using your arms (e.g. wheelchair or bedside chair): Total  To walk in hospital room: Total  Climbing 3-5 steps with railing: Total  Basic Mobility - Total Score: 8    Encounter Problems       Encounter Problems (Active)       Balance       STG - Maintains dynamic standing balance with upper extremity support (Progressing)       Start:  10/22/24    Expected End:  11/19/24       INTERVENTIONS:  1. Practice standing with minimal support.  2. Educate patient about standing tolerance.  3. Educate patient about independence with gait, transfers, and ADL's.  4. Educate patient about use of assistive device.  5. Educate patient about self-directed care.            Mobility       STG - Patient will ambulate 30 ft, FWW, + turns, min assist of 1 (Progressing)       Start:  10/22/24    Expected End:  11/19/24               PT Transfers       STG - Patient to transfer to and from sit to supine close supervision of 1 (Progressing)       Start:  10/22/24    Expected End:  11/19/24            STG - Patient will transfer sit to and from stand with min assist of 1 (Progressing)       Start:  10/22/24    Expected End:  11/19/24               Pain - Adult              Education Documentation  Precautions, taught by Sapphire Barrow PT at 10/22/2024 11:30 AM.  Learner: Patient  Readiness: Acceptance  Method: Explanation  Response: Needs Reinforcement    Mobility Training, taught by Sapphire Barrow PT at 10/22/2024 11:30 AM.  Learner: Patient  Readiness: Acceptance  Method: Explanation  Response: Needs Reinforcement    Education Comments  No comments found.

## 2024-10-22 NOTE — PROGRESS NOTES
Dian Perez is a 79 y.o. female on day 2 of admission presenting with Nausea and vomiting, unspecified vomiting type.    Subjective   Symptoms (0 - 10, Best to Worst)  [facility] Symptom Assessment System  0-10 (Numeric) Pain Score: 0 - No pain  CO pain to joints only when bearing weight, has not yet tried tramadol. Co stuffy nose this am. Moist nonprod cough. Biggest complaint is feeling tired. Slept poorly overnight.        Objective     Vitals and nursing note reviewed.   Constitutional:       General: She is not in acute distress.     Appearance: She is ill-appearing.      Comments: Appears stated age, chronically ill appearing, lying in bed, no nonverbal signs of distress   HENT:      Head: Normocephalic and atraumatic.      Comments: alopecia     Nose: Congestion and rhinorrhea present.      Mouth/Throat:      Mouth: Mucous membranes are moist.      Pharynx: Oropharynx is clear.   Eyes:      General: No scleral icterus.        Right eye: No discharge.         Left eye: No discharge.      Extraocular Movements: Extraocular movements intact.      Pupils: Pupils are equal, round, and reactive to light.   Neck:      Comments: Limited flexion, rotation  Cardiovascular:      Rate and Rhythm: Normal rate and regular rhythm.      Pulses: Normal pulses.      Heart sounds: No murmur heard.  Pulmonary:      Effort: Pulmonary effort is normal. No respiratory distress.      Breath sounds: Rhonchi present.      Comments: RA, moist nonprod cough, diminished RUL  Abdominal:      General: Abdomen is flat. Bowel sounds are normal. There is no distension.      Palpations: Abdomen is soft.      Tenderness: There is abdominal tenderness.      Comments: Epigastric tenderness   Musculoskeletal:         General: Tenderness present. No swelling, deformity or signs of injury. Normal range of motion.      Cervical back: Normal range of motion and neck supple. Tenderness present.      Right lower leg: No edema.      Left lower leg: No edema.  "  Skin:     General: Skin is warm and dry.      Capillary Refill: Capillary refill takes 2 to 3 seconds.      Coloration: Skin is pale.   Neurological:      General: No focal deficit present.      Mental Status: She is alert and oriented to person, place, and time.      Sensory: No sensory deficit.      Motor: Weakness present.      Coordination: Coordination normal.   Psychiatric:         Mood and Affect: Mood normal.         Behavior: Behavior normal.        Last Recorded Vitals  Blood pressure (!) 120/40, pulse 88, temperature 37.3 °C (99.1 °F), temperature source Temporal, resp. rate 18, height 1.702 m (5' 7\"), weight 75.8 kg (167 lb 1.7 oz), SpO2 92%.  Intake/Output last 3 Shifts:  I/O last 3 completed shifts:  In: 3218 (42.5 mL/kg) [P.O.:640; I.V.:228 (3 mL/kg); IV Piggyback:2350]  Out: 1450 (19.1 mL/kg) [Urine:1450 (0.5 mL/kg/hr)]  Weight: 75.8 kg     Relevant Results  Results for orders placed or performed during the hospital encounter of 10/20/24 (from the past 24 hours)   Occult Blood, Stool    Specimen: Stool   Result Value Ref Range    Occult Blood, Stool X1 Negative Negative   Transthoracic Echo (TTE) Complete   Result Value Ref Range    AV pk tammy 1.22 m/s    LVOT diam 2.24 cm    AV mn grad 3.3 mmHg    MV E/A ratio 0.92     Tricuspid annular plane systolic excursion 2.3 cm    LV Biplane EF 58 %    LA vol index A/L 24.7 ml/m2    MV avg E/e' ratio 10.64     LV EF 58 %    RV free wall pk S' 15.98 cm/s    RVSP 22.3 mmHg    LVIDd 3.91 cm    AV pk grad 6.0 mmHg    Aortic Valve Area by Continuity of VTI 3.57 cm2    Aortic Valve Area by Continuity of Peak Velocity 3.48 cm2    LV A4C EF 54.4    APTT   Result Value Ref Range    aPTT 86.8 (H) 22.0 - 32.5 seconds   Carcinoembryonic Antigen   Result Value Ref Range    Carcinoembryonic AG 10.6 ug/L      Result Value Ref Range    Cancer  77.8 (H) 0.0 - 30.2 U/mL   Cancer Antigen 19-9   Result Value Ref Range    Cancer AG 19-9 49.40 (H) <35.00 U/mL "   Creatine Kinase   Result Value Ref Range    Creatine Kinase 12 0 - 215 U/L   POCT GLUCOSE   Result Value Ref Range    POCT Glucose 180 (H) 74 - 99 mg/dL   POCT GLUCOSE   Result Value Ref Range    POCT Glucose 157 (H) 74 - 99 mg/dL   POCT GLUCOSE   Result Value Ref Range    POCT Glucose 166 (H) 74 - 99 mg/dL   APTT   Result Value Ref Range    aPTT 42.7 (H) 22.0 - 32.5 seconds   aPTT   Result Value Ref Range    aPTT 50.4 (H) 22.0 - 32.5 seconds   Protime-INR   Result Value Ref Range    Protime 11.6 9.3 - 12.7 seconds    INR 1.1 0.9 - 1.2   Lavender Top   Result Value Ref Range    Extra Tube Hold for add-ons.    Comprehensive Metabolic Panel   Result Value Ref Range    Glucose 167 (H) 74 - 99 mg/dL    Sodium 132 (L) 136 - 145 mmol/L    Potassium 3.0 (L) 3.5 - 5.3 mmol/L    Chloride 96 (L) 98 - 107 mmol/L    Bicarbonate 30 21 - 32 mmol/L    Anion Gap 9 (L) 10 - 20 mmol/L    Urea Nitrogen 5 (L) 6 - 23 mg/dL    Creatinine 0.66 0.50 - 1.05 mg/dL    eGFR 90 >60 mL/min/1.73m*2    Calcium 8.7 8.6 - 10.3 mg/dL    Albumin 2.6 (L) 3.4 - 5.0 g/dL    Alkaline Phosphatase 39 33 - 136 U/L    Total Protein 6.5 6.4 - 8.2 g/dL    AST 10 9 - 39 U/L    Bilirubin, Total 0.4 0.0 - 1.2 mg/dL    ALT <3 (L) 7 - 45 U/L    Transthoracic Echo (TTE) Complete    Result Date: 10/22/2024           [place] [address]            Phone [phone] TRANSTHORACIC ECHOCARDIOGRAM REPORT Patient Name:     ZION Mayer Physician:   [id number] Konrad Baker MD Study Date:       10/21/2024           Ordering Provider:   [id number] JAY GEIGER MRN/PID:          [id number]             Fellow: Accession#:       [id number]         Nurse: Date of           1945 / 79 years  Sonographer:         Stacey Funes RDCS Birth/Age: Gender:           F                    Additional Staff: Height:           170.18 cm            Admit Date: Weight:            75.75 kg             Admission Status:    Inpatient - Routine BSA / BMI:        1.87 m2 / 26.16      Department Location: [facility]                   kg/m2 Blood Pressure: 116 /67 mmHg Study Type:    TRANSTHORACIC ECHO (TTE) COMPLETE Diagnosis/ICD: Intracardiac thrombosis, not elsewhere classified-I51.3 Indication:    mass in left atrium, and left ventricle CPT Codes:     Echo Complete w Full [id number] Patient History: Pertinent History: DM, metastic disease, LA mass, sinus tachy, elevated                    troponin, SOB. Study Detail: The following Echo studies were performed: 2D, M-Mode, Doppler and               color flow. Definity used as a contrast agent for endocardial               border definition. Total contrast used for this procedure was 2 mL               via IV push.  Critical Event Critical Event: Test was completed as per department protocol. Critical Finding: Mass in the left atrium. Time Test was Completed: 1:35:05 PM Notified: DR. Baker. Attending notification time: 1:41:00 AM  PHYSICIAN INTERPRETATION: Left Ventricle: Left ventricular ejection fraction is normal, by visual estimate at 55-60%. There are no regional wall motion abnormalities. The left ventricular cavity size is normal. There is mild concentric left ventricular hypertrophy involving the basal wall and septal wall. Spectral Doppler shows an impaired relaxation pattern of left ventricular diastolic filling. Left Atrium: The left atrium is upper limits of normal in size. There is a moderately sized fixed left atrial thrombus The left atrial thrombus is spherical and irregular in shape. More likely thrombus than metastatic tumor in the left atrium. Right Ventricle: The right ventricle is normal in size. There is normal right ventricular global systolic function. Right Atrium: The right atrium is upper limits of normal in size. Possible small thrombus in the right atrium. Aortic Valve: The aortic valve is trileaflet. There is  evidence of mildly elevated transaortic gradients consistent with sclerosis of the aortic valve. The aortic valve dimensionless index is 0.90. There is trace aortic valve regurgitation. The peak instantaneous gradient of the aortic valve is 6.0 mmHg. The mean gradient of the aortic valve is 3.3 mmHg. Mitral Valve: The mitral valve is normal in structure. There is trace mitral valve regurgitation. Tricuspid Valve: The tricuspid valve is structurally normal. There is trace to mild tricuspid regurgitation. Pulmonic Valve: The pulmonic valve is not well visualized. There is trace pulmonic valve regurgitation. Pericardium: No pericardial effusion noted. Aorta: The aortic root is normal.  CONCLUSIONS:  1. Left ventricular ejection fraction is normal, by visual estimate at 55-60%.  2. Spectral Doppler shows an impaired relaxation pattern of left ventricular diastolic filling.  3. There is normal right ventricular global systolic function.  4. Moderately sized left atrial thrombus.  5. Aortic valve sclerosis.  6. More likely thrombus than metastatic tumor in the left atrium. QUANTITATIVE DATA SUMMARY:  2D MEASUREMENTS:           Normal Ranges: LAs:             4.55 cm   (2.7-4.0cm) IVSd:            1.06 cm   (0.6-1.1cm) LVPWd:           0.72 cm   (0.6-1.1cm) LVIDd:           3.91 cm   (3.9-5.9cm) LVIDs:           2.85 cm LV Mass Index:   55.7 g/m2 LV % FS          27.2 %  LA VOLUME:                    Normal Ranges: LA Vol A4C:        48.6 ml    (22+/-6mL/m2) LA Vol A2C:        40.0 ml LA Vol BP:         46.2 ml LA Vol Index A4C:  26.0 ml/m2 LA Vol Index A2C:  21.4 ml/m2 LA Vol Index BP:   24.7 ml/m2 LA Area A4C:       17.9 cm2 LA Area A2C:       15.5 cm2 LA Major Axis A4C: 5.6 cm LA Major Axis A2C: 5.1 cm LA Volume Index:   24.7 ml/m2 LA Vol A4C:        48.0 ml LA Vol A2C:        40.0 ml LA Vol Index BSA:  23.5 ml/m2  RA VOLUME BY A/L METHOD:            Normal Ranges: RA Vol A4C:              42.3 ml    (8.3-19.5ml) RA Vol  Index A4C:        22.6 ml/m2 RA Area A4C:             16.7 cm2 RA Major Axis A4C:       5.6 cm  M-MODE MEASUREMENTS:         Normal Ranges: LAs:                 4.20 cm (2.7-4.0cm)  AORTA MEASUREMENTS:         Normal Ranges: Ao Sinus, d:        3.20 cm (2.1-3.5cm) Ao STJ, d:          3.00 cm (1.7-3.4cm) Asc Ao, d:          3.10 cm (2.1-3.4cm)  LV SYSTOLIC FUNCTION BY 2D PLANIMETRY (MOD):                      Normal Ranges: EF-A4C View:    54 % (>=55%) EF-A2C View:    60 % EF-Biplane:     58 % EF-Visual:      58 % LV EF Reported: 58 %  LV DIASTOLIC FUNCTION:           Normal Ranges: MV Peak E:             0.74 m/s  (0.7-1.2 m/s) MV Peak A:             0.81 m/s  (0.42-0.7 m/s) E/A Ratio:             0.92      (1.0-2.2) MV e'                  0.080 m/s (>8.0) MV lateral e'          0.09 m/s MV medial e'           0.07 m/s E/e' Ratio:            9.18      (<8.0)  MITRAL VALVE:          Normal Ranges: MV DT:        274 msec (150-240msec)  AORTIC VALVE:                     Normal Ranges: AoV Vmax:                1.22 m/s (<=1.7m/s) AoV Peak P.0 mmHg (<20mmHg) AoV Mean PG:             3.3 mmHg (1.7-11.5mmHg) LVOT Max Jeancarlos:            1.08 m/s (<=1.1m/s) AoV VTI:                 21.74 cm (18-25cm) LVOT VTI:                19.64 cm LVOT Diameter:           2.24 cm  (1.8-2.4cm) AoV Area, VTI:           3.57 cm2 (2.5-5.5cm2) AoV Area,Vmax:           3.48 cm2 (2.5-4.5cm2) AoV Dimensionless Index: 0.90  RIGHT VENTRICLE: RV Basal 4.05 cm RV Major 6.3 cm TAPSE:   23.0 mm RV s'    0.16 m/s  TRICUSPID VALVE/RVSP:          Normal Ranges: Peak TR Velocity:     2.20 m/s RV Syst Pressure:     22 mmHg  (< 30mmHg) IVC Diam:             1.75 cm  AORTA: Asc Ao Diam 3.13 cm  [id number] Konrad Baker MD Electronically signed on 10/22/2024 at 8:07:52 AM  ** Final **     ECG 12 Lead    Result Date: 10/21/2024  Sinus tachycardia with occasional Premature ventricular complexes Nonspecific T wave abnormality Prolonged QT Abnormal ECG No  previous ECGs available Confirmed by Pablito Caraballo ([id number]) on 10/21/2024 12:09:13 PM    CT chest abdomen pelvis w IV contrast    Result Date: 10/20/2024  Interpreted By:  Linda Stafford, STUDY: CT CHEST ABDOMEN PELVIS W IV CONTRAST;  10/20/2024 5:30 pm   INDICATION: Signs/Symptoms:n/v.   COMPARISON: None.   ACCESSION NUMBER(S): [id number]   ORDERING CLINICIAN: LUCY HOLLAND   TECHNIQUE: Axial CT images of the chest, abdomen and pelvis with coronal and sagittal reconstructed images obtained after intravenous administration of 75 mL of Omnipaque 350.   FINDINGS: CHEST:   VESSELS: Aorta is normal caliber. Atherosclerotic changes in the aorta and coronary arteries. HEART: Normal size. No pericardial effusion. There is a lobulated 2.8 x 3.6 x 2.8 cm mass in the left atrium which appears contiguous with large subcarinal hypoattenuating mass described below. MEDIASTINUM AND JAC: Multiple enlarged mediastinal lymph nodes are present, largest in the subcarinal region demonstrates central hypoattenuation measuring 3.6 x 5.3 cm concerning for central necrosis. There is loss of fat plane with the adjacent esophagus as well as left ventricle concerning for local invasion. Additional enlarged and prominent thoracic lymph nodes noted. LUNG, PLEURA, LARGE AIRWAYS: There is a multilobulated centrally hypointense mass in the right upper lobe and jac measuring approximately 8.6 x 12.3 x 8.3 cm. This demonstrates central foci of gas and hypoattenuation concerning for necrosis. There is marked narrowing of the right upper lobe pulmonary artery with occlusion of the right upper lobe pulmonary vein and bronchus. There is mass-effect and narrowing of the SVC. Findings are highly concerning for primary malignancy. Left lung is clear. No effusion or pneumothorax. CHEST WALL AND LOWER NECK: Subcentimeter hypodense nodule and coarse calcification in the right lobe of the thyroid gland is nonspecific. There is a heterogeneous enlarged left  subpectoral mass measuring 3.8 x 3.8 cm. BONES: Generalized diffuse osteopenia. Multilevel degenerative changes of the spine. Bilateral shoulder osteoarthrosis.   ABDOMEN:   LIVER: Within normal limits. BILE DUCTS: Normal caliber. GALLBLADDER: Status post cholecystectomy. PANCREAS: Marked fatty atrophy of the pancreas. SPLEEN: Within normal limits. ADRENALS: Heterogeneous 3 cm left adrenal nodule concerning for metastases. Right adrenal glands within normal limits. KIDNEYS: Symmetric renal enhancement. No hydronephrosis or perinephric fluid collection. URETERS: No hydroureter.   VESSELS: Calcific atherosclerosis of the aortoiliac vessels. No aortic aneurysm. RETROPERITONEUM: Within normal limits.   PELVIS:   REPRODUCTIVE ORGANS: Anteverted uterus. The endometrial complex is abnormally thickened measuring up to 11 mm. No adnexal mass. BLADDER: Within normal limits.   BOWEL: Stomach is under distended. Visualized loops of bowel are without evidence for obstruction. Appendix is not identified with certainty. No pericecal inflammatory changes seen. Mural stratification of the colon likely sequela of remote colitis. A few scattered colonic diverticula without evidence for acute diverticulitis. No pneumatosis or portal venous gas. PERITONEUM: No ascites or free air, no fluid collection.   ABDOMINAL WALL: Within normal limits. BONES: Generalized diffuse osteopenia. Multilevel degenerative changes of the spine. Mild S shaped scoliosis.       There is a large heterogeneous multilobulated mass in the right upper lobe and jac measuring approximately 8.6 x 12.3 x 8.3 cm. This demonstrates central foci of gas and hypoattenuation concerning for necrosis. There is marked narrowing of the right upper lobe pulmonary artery with occlusion of the right upper lobe pulmonary vein and bronchus. There is mass-effect and narrowing of the SVC. Findings are highly concerning for primary malignancy.   There are multiple enlarged mediastinal  lymph nodes concerning for alton metastases. The largest in the subcarinal region demonstrates central hypoattenuation measuring 3.6 x 5.3 cm concerning for central necrosis. There is loss of fat plane with the adjacent esophagus and left ventricle with a lobulated 2.8 x 3.6 x 2.8 cm mass in the left atrium which is highly concerning for local invasion. Focal thrombus in the left ventricle not excluded. Further evaluation with echocardiogram is recommended.   There is a heterogeneous enlarged left subpectoral mass measuring 3.8 x 3.8 cm consistent with metastases.   Heterogeneous 3 cm left adrenal nodule is concerning for metastases.   The endometrial complex is abnormally thickened measuring up to 11 mm. Given patient's stated age differential considerations include hyperplasia versus neoplasia. Gynecological consultation further evaluation with nonemergent ultrasound is advised.   Diverticulosis without evidence for acute diverticulitis. Mural stratification of the colon likely sequela of remote colitis. Correlate with symptomatology if there is concern for superimposed early colitis.   Additional findings as described above.   MACRO: Linda Stafford discussed the significance and urgency of this critical finding by telephone with  LUCY HOLLAND on 10/20/2024 at 6:36 pm. (**-RCF-**) Findings:  See findings.   Signed by: Linda Stafford 10/20/2024 6:44 PM Dictation workstation:   [id number]    XR chest 1 view    Result Date: 10/20/2024  Interpreted By:  Divina Garcia, STUDY: XR CHEST 1 VIEW; ;  10/20/2024 4:03 pm   INDICATION: Signs/Symptoms:weakness.     COMPARISON: 01/08/2008   ACCESSION NUMBER(S): [id number]   ORDERING CLINICIAN: LUCY HOLLAND   FINDINGS: There is a 9 x 9 cm mass like airspace opacity in the right upper lung zone silhouetting the right perihilar region. Left lung is relatively clear. No large pleural effusions within limits of portable technique. No large pneumothorax. No acute osseous findings.        9 x 9 cm masslike airspace opacity in the right upper lung zone is concerning neoplastic process. Recommend further evaluation with CT chest with contrast.     MACRO: Critical Finding:  See findings. Notification was initiated on 10/20/2024 at 4:40 pm by Dr. Divina Garcia.  (**-YCF-**) Instructions:   Signed by: Divina Garcia 10/20/2024 4:41 PM Dictation workstation:   [id number]      Assessment/Plan   IMP:    UTI  Nausea/Vomiting  LA Thrombus  Mass RUL with L adrenal nodule, mediastinal LN, L subpectoral mass  Prolonged QTC interval  Hypokalemia/Hyponatremia  Anemia  Debility  OA Pain  Palliative Care     DNRCCA/DNI/No ICU  Capable  Has 3 children, Kenia, Kamille and estranged son  No advanced directives.     10/22  Encouraged use of prn tramadol low dose and tylenol to assist with pain control, encouraged hydration. Awaiting bone scan, mri brain and lung biopsy.   Planning for SNF when medically stable, then consideration for hospice if she does not want any treatment.   Needs HPOA and LW completed. LSW consulted.     10/21  Patient very clear that she does not want any treatment for her likely malignancy, however she is agreeable to lung biopsy to confirm diagnosis and she is ok with bone scan, MRI brain to assist with prognostication. She values quality of life and confesses that her quality of life currently is very poor. She is not afraid to die. She is bedbound at baseline and agrees with daughter that she cannot safely return home.   Discussed concerns and wishes with attending and daughter. Plan to medically stablize, pursue lung biopsy while inpatient. Then plan for rehab to optimize physical function. Likely will require placement after rehab, with hospice for supportive care. Care coordination involved, will need family choices for SNF. We initiated conversations about hospice referral, will follow up tomorrow for patient/family decision. Daughter planning to contact [id number] to get  simple will and DPOA. I provided copy of HPOA and LW for daughter/patient to review, will follow up tomorrow to sign/witness.      Treatment model of care, plan for SNF when medically stable, will then likely transition to LTC with hospice.     I spent 30 minutes in the professional and overall care of this patient.    Jimena Kna, APRN-CNP

## 2024-10-22 NOTE — CONSULTS
Reason For Consult  Lung mass    History Of Present Illness  Dian Perez is a 79 y.o. female presenting with nausea, vomiting, shortness of breath, myalgias and weakness.  Poor historian.  Some history taken from daughter who does not live with her.  It was reported this morning she was so weak she fell off the toilet did not have a head strike, due to feeling lightheaded and dizzy.  She reports feeling feverish with chills, was given Tylenol in the ED.  She has had a lingering none bloody worsening cough for a week, also nonbloody nonbilious vomiting and dry heaving for a couple days.  Greater than 120 pack year smoking history.  Patient denies chest pain, abdominal pain, numbness or tingling, hemoptysis, hematochezia, or dysuria.     In the ED vitals notable for , RR 21, BP 97/67.  Labs notable for glucose 215, sodium 131, K2.7, CL 86, magnesium 1.46, lactic acid 4.3->1.2, hemoglobin 12.5->9.8, UA 2+ nitrite, troponin 15-> 15.  TSH normal flu A/B negative.     CT chest abdomen pelvis:  - Large heterogeneous multilobulated mass in the right upper lobe and jac measuring 8.6 x 12.3 x 8.3 cm with concerns of necrosis.   -Multiple enlarged mediastinal lymph nodes concerning for alton metastasis with concerns for central necrosis  -2.8 x 3.6 x 2.8 cm mass in the left atrium concerning for local invasion.  Focal thrombus in the left ventricle not excluded  -Enlarged left subpectoral mass measuring 3.8 x 3.8 cm consistent with metastasis  -Mass effect and narrowing of SVC  -Thickened endometrial complex measuring up to 11 mm suspicious for hyperplasia versus neoplasia.  -Possible superimposed early colitis  -Incidental heterogeneous 3 cm left adrenal nodule concerning for metastasis     Patient admitted for shortness of breath, and intractable nausea & vomiting secondary to lung cancer with metastasis        Past Medical History  She has a past medical history of Arthritis, Diabetes mellitus (Multi), Disease of  thyroid gland, and Hypertension.    Surgical History  She has a past surgical history that includes Tonsillectomy.     Social History  She reports that she has been smoking cigarettes. She started smoking about 62 years ago. She has a 125.6 pack-year smoking history. She has never used smokeless tobacco. She reports that she does not drink alcohol and does not use drugs.    Family History  No family history on file.     Allergies  Fluorouracil-adhesive bandage, Statins-hmg-coa reductase inhibitors, and Adhesive    Review of Systems  As per in HPI, otherwise all other ROS are negative     Physical Exam  Constitutional:       General: She is not in acute distress.     Appearance: She is ill-appearing. She is not toxic-appearing or diaphoretic.   HENT:      Mouth/Throat:      Mouth: Mucous membranes are moist.   Eyes:      Extraocular Movements: Extraocular movements intact.   Cardiovascular:      Rate and Rhythm: Normal rate.      Pulses: Normal pulses.      Heart sounds: No murmur heard.     No gallop.   Pulmonary:      Effort: Pulmonary effort is normal. No respiratory distress.      Breath sounds: No wheezing, rhonchi or rales.      Comments: Decreased bilateral breath sounds  Abdominal:      General: Abdomen is flat. There is no distension.      Palpations: Abdomen is soft.      Tenderness: There is no abdominal tenderness. There is no guarding.   Musculoskeletal:      Cervical back: Normal range of motion and neck supple.      Right lower leg: No edema.      Left lower leg: No edema.   Skin:     General: Skin is warm and dry.   Neurological:      Mental Status: She is alert and oriented to person, place, and time. Mental status is at baseline.      Motor: No weakness.   Psychiatric:         Mood and Affect: Mood normal.         Behavior: Behavior normal.            Last Recorded Vitals  Blood pressure 112/55, pulse 85, temperature 36.7 °C (98.1 °F), temperature source Temporal, resp. rate 22, height 1.702 m (5'  "7\"), weight 75.8 kg (167 lb 1.7 oz), SpO2 97%.    Relevant Results   Latest Reference Range & Units 10/21/24 06:32   WBC 4.4 - 11.3 x10*3/uL 9.7   nRBC 0.0 - 0.0 /100 WBCs 0.0   RBC 4.00 - 5.20 x10*6/uL 3.41 (L)   HEMOGLOBIN 12.0 - 16.0 g/dL  12.0 - 16.0 g/dL 10.4 (L)  10.4 (L)   HEMATOCRIT 36.0 - 46.0 %  36.0 - 46.0 % 31.8 (L)  31.8 (L)   MCV 80 - 100 fL 93   MCH 26.0 - 34.0 pg 30.5   MCHC 32.0 - 36.0 g/dL 32.7   RED CELL DISTRIBUTION WIDTH 11.5 - 14.5 % 14.7 (H)   Platelets 150 - 450 x10*3/uL 357   (L): Data is abnormally low  (H): Data is abnormally high     Assessment/Plan     1- Lung mass:    CT imaging with large multilobulated mass in the right upper lobe and jac with necrosis, enlarged mediastinal lymph nodes from metastasis, with suspected mass in the left atrium and ventricle, as well as left subpectoral mass and possible SVC involvement with narrowing, and suspected metastasis to left adrenal nodule     Nausea and vomiting is concerning for possible brain metastases. MRI brain and bone scan are ordered to complete staging.    She wants all the testing done and wants to know what type f cancer she has and then discuss treatment options but not sure if she will want to get chemotherapy.    Biopsy by IR is ordered.     Of note, CEA which is a marker for lung cancer is normal. But  and  is elevated which are generally seen in ovarian cancer/malignant pleural effusion or malignant ascites, and pancreaticobiliary type cancers, respectively.    2- Left atrial mass  CT imaging showing thrombus in the left atrium and left ventricle  Continue heparin drip  Echo ordered  Cardiology consulted  Started on coumadin by cardiology - will bridge with heparin and stop heparin gtt when INR is therapeutic    3- Anemia:  iron panel shows anemia of chronic disease but also she has folate deficiency. Folic acid 1 mg every day is ordered.    I spent 90 minutes in the professional and overall care of this " patient.      Sherlyn Graham MD

## 2024-10-22 NOTE — ASSESSMENT & PLAN NOTE
CT imaging showing thrombus in the left atrium and left ventricle  Continue heparin drip  Echo showing EF 55-60%, diastolic dysfunction, and moderately sized left atrial thrombus   Cardiology consulted  Started on coumadin by cardiology - will bridge with heparin and stop heparin gtt when INR is therapeutic

## 2024-10-22 NOTE — NURSING NOTE
Spoke with IR nurse about lymph node biopsy tomorrow scheduled for 1400.  Patient to be NPO after midnight.  Heparin DOES NOT NEED TO BE HELD for tomorrow's procedure.  Orders updated.

## 2024-10-22 NOTE — CARE PLAN
Problem: Respiratory  Goal: Clear secretions with interventions this shift  Outcome: Progressing  Goal: Minimal/no exertional discomfort or dyspnea this shift  Outcome: Progressing  Goal: No signs of respiratory distress (eg. Use of accessory muscles. Peds grunting)  Outcome: Progressing  Goal: Tolerate pulmonary toileting this shift  Outcome: Progressing  Goal: Verbalize decreased shortness of breath this shift  Outcome: Progressing  Goal: Increase self care and/or family involvement in next 24 hours  Outcome: Progressing  Pt on RA.  Was given and shown how to use a IS and VPEP,

## 2024-10-22 NOTE — PROGRESS NOTES
Zion Perez is a 79 y.o. female on day 2 of admission presenting with Nausea and vomiting, unspecified vomiting type.      Subjective   States she feels tired this morning. Tolerating PO. Denies any complaints currently.        Objective     Last Recorded Vitals  BP (!) 120/40 (BP Location: Left arm, Patient Position: Lying)   Pulse 88   Temp 37.3 °C (99.1 °F) (Temporal)   Resp 18   Wt 75.8 kg (167 lb 1.7 oz)   SpO2 92%   Intake/Output last 3 Shifts:    Intake/Output Summary (Last 24 hours) at 10/22/2024 1057  Last data filed at 10/22/2024 0856  Gross per 24 hour   Intake 930 ml   Output 950 ml   Net -20 ml       Admission Weight  Weight: 77.1 kg (170 lb) (10/20/24 1520)    Daily Weight  10/22/24 : 75.8 kg (167 lb 1.7 oz)    Image Results  Transthoracic Echo (TTE) [place] [address]             Phone [phone]    TRANSTHORACIC ECHOCARDIOGRAM REPORT    Patient Name:     ZION PEREZ            Reading Physician:   [id number] Konrad Baker MD  Study Date:       10/21/2024           Ordering Provider:   [id number] GEIGER  MRN/PID:          [id number]             Fellow:  Accession#:       [id number]         Nurse:  Date of           1945 / 79 years  Sonographer:         Stacey Funes RDCS  Birth/Age:  Gender:           F                    Additional Staff:  Height:           170.18 cm            Admit Date:  Weight:           75.75 kg             Admission Status:    Inpatient - Routine  BSA / BMI:        1.87 m2 / 26.16      Department Location: [facility]                    kg/m2  Blood Pressure: 116 /67 mmHg    Study Type:    TRANSTHORACIC ECHO (TTE) COMPLETE  Diagnosis/ICD: Intracardiac thrombosis, not elsewhere classified-I51.3  Indication:    mass in left atrium, and left ventricle  CPT Codes:     Echo Complete w Full [id number]    Patient History:  Pertinent History: DM,  metastic disease, LA mass, sinus tachy, elevated                     troponin, SOB.    Study Detail: The following Echo studies were performed: 2D, M-Mode, Doppler and                color flow. Definity used as a contrast agent for endocardial                border definition. Total contrast used for this procedure was 2 mL                via IV push.       Critical Event  Critical Event: Test was completed as per department protocol.  Critical Finding: Mass in the left atrium.  Time Test was Completed: 1:35:05 PM  Notified: DR. Baker.  Attending notification time: 1:41:00 AM     PHYSICIAN INTERPRETATION:  Left Ventricle: Left ventricular ejection fraction is normal, by visual estimate at 55-60%. There are no regional wall motion abnormalities. The left ventricular cavity size is normal. There is mild concentric left ventricular hypertrophy involving the basal wall and septal wall. Spectral Doppler shows an impaired relaxation pattern of left ventricular diastolic filling.  Left Atrium: The left atrium is upper limits of normal in size. There is a moderately sized fixed left atrial thrombus The left atrial thrombus is spherical and irregular in shape. More likely thrombus than metastatic tumor in the left atrium.  Right Ventricle: The right ventricle is normal in size. There is normal right ventricular global systolic function.  Right Atrium: The right atrium is upper limits of normal in size. Possible small thrombus in the right atrium.  Aortic Valve: The aortic valve is trileaflet. There is evidence of mildly elevated transaortic gradients consistent with sclerosis of the aortic valve.  The aortic valve dimensionless index is 0.90. There is trace aortic valve regurgitation. The peak instantaneous gradient of the aortic valve is 6.0 mmHg. The mean gradient of the aortic valve is 3.3 mmHg.  Mitral Valve: The mitral valve is normal in structure. There is trace mitral valve regurgitation.  Tricuspid Valve: The  tricuspid valve is structurally normal. There is trace to mild tricuspid regurgitation.  Pulmonic Valve: The pulmonic valve is not well visualized. There is trace pulmonic valve regurgitation.  Pericardium: No pericardial effusion noted.  Aorta: The aortic root is normal.       CONCLUSIONS:   1. Left ventricular ejection fraction is normal, by visual estimate at 55-60%.   2. Spectral Doppler shows an impaired relaxation pattern of left ventricular diastolic filling.   3. There is normal right ventricular global systolic function.   4. Moderately sized left atrial thrombus.   5. Aortic valve sclerosis.   6. More likely thrombus than metastatic tumor in the left atrium.    QUANTITATIVE DATA SUMMARY:     2D MEASUREMENTS:           Normal Ranges:  LAs:             4.55 cm   (2.7-4.0cm)  IVSd:            1.06 cm   (0.6-1.1cm)  LVPWd:           0.72 cm   (0.6-1.1cm)  LVIDd:           3.91 cm   (3.9-5.9cm)  LVIDs:           2.85 cm  LV Mass Index:   55.7 g/m2  LV % FS          27.2 %       LA VOLUME:                    Normal Ranges:  LA Vol A4C:        48.6 ml    (22+/-6mL/m2)  LA Vol A2C:        40.0 ml  LA Vol BP:         46.2 ml  LA Vol Index A4C:  26.0 ml/m2  LA Vol Index A2C:  21.4 ml/m2  LA Vol Index BP:   24.7 ml/m2  LA Area A4C:       17.9 cm2  LA Area A2C:       15.5 cm2  LA Major Axis A4C: 5.6 cm  LA Major Axis A2C: 5.1 cm  LA Volume Index:   24.7 ml/m2  LA Vol A4C:        48.0 ml  LA Vol A2C:        40.0 ml  LA Vol Index BSA:  23.5 ml/m2       RA VOLUME BY A/L METHOD:            Normal Ranges:  RA Vol A4C:              42.3 ml    (8.3-19.5ml)  RA Vol Index A4C:        22.6 ml/m2  RA Area A4C:             16.7 cm2  RA Major Axis A4C:       5.6 cm       M-MODE MEASUREMENTS:         Normal Ranges:  LAs:                 4.20 cm (2.7-4.0cm)       AORTA MEASUREMENTS:         Normal Ranges:  Ao Sinus, d:        3.20 cm (2.1-3.5cm)  Ao STJ, d:          3.00 cm (1.7-3.4cm)  Asc Ao, d:          3.10 cm (2.1-3.4cm)        LV SYSTOLIC FUNCTION BY 2D PLANIMETRY (MOD):                       Normal Ranges:  EF-A4C View:    54 % (>=55%)  EF-A2C View:    60 %  EF-Biplane:     58 %  EF-Visual:      58 %  LV EF Reported: 58 %       LV DIASTOLIC FUNCTION:           Normal Ranges:  MV Peak E:             0.74 m/s  (0.7-1.2 m/s)  MV Peak A:             0.81 m/s  (0.42-0.7 m/s)  E/A Ratio:             0.92      (1.0-2.2)  MV e'                  0.080 m/s (>8.0)  MV lateral e'          0.09 m/s  MV medial e'           0.07 m/s  E/e' Ratio:            9.18      (<8.0)       MITRAL VALVE:          Normal Ranges:  MV DT:        274 msec (150-240msec)       AORTIC VALVE:                     Normal Ranges:  AoV Vmax:                1.22 m/s (<=1.7m/s)  AoV Peak P.0 mmHg (<20mmHg)  AoV Mean PG:             3.3 mmHg (1.7-11.5mmHg)  LVOT Max Jeancarlos:            1.08 m/s (<=1.1m/s)  AoV VTI:                 21.74 cm (18-25cm)  LVOT VTI:                19.64 cm  LVOT Diameter:           2.24 cm  (1.8-2.4cm)  AoV Area, VTI:           3.57 cm2 (2.5-5.5cm2)  AoV Area,Vmax:           3.48 cm2 (2.5-4.5cm2)  AoV Dimensionless Index: 0.90       RIGHT VENTRICLE:  RV Basal 4.05 cm  RV Major 6.3 cm  TAPSE:   23.0 mm  RV s'    0.16 m/s       TRICUSPID VALVE/RVSP:          Normal Ranges:  Peak TR Velocity:     2.20 m/s  RV Syst Pressure:     22 mmHg  (< 30mmHg)  IVC Diam:             1.75 cm       AORTA:  Asc Ao Diam 3.13 cm       [id number] Konrad Baker MD  Electronically signed on 10/22/2024 at 8:07:52 AM       ** Final **      Physical Exam  Constitutional:       General: She is not in acute distress.     Appearance: She is ill-appearing. She is not toxic-appearing.      Comments: Sleeping, awakens easily to voice   HENT:      Head: Normocephalic.      Mouth/Throat:      Pharynx: Oropharynx is clear.   Eyes:      General: No scleral icterus.  Cardiovascular:      Rate and Rhythm: Normal rate.   Pulmonary:      Effort: No respiratory distress.      Breath  sounds: No wheezing.   Abdominal:      General: There is no distension.      Palpations: Abdomen is soft.   Musculoskeletal:      Right lower leg: No edema.      Left lower leg: No edema.   Psychiatric:         Behavior: Behavior normal.         Relevant Results               Assessment/Plan            Assessment & Plan  Mass of upper lobe of right lung  With metastatic disease  Greater than 120-pack-year smoking history  CT imaging with large multilobulated mass in the right upper lobe and jac with necrosis, enlarged mediastinal lymph nodes from metastasis, with suspected mass in the left atrium and ventricle, as well as left subpectoral mass and possible SVC involvement with narrowing, and suspected metastasis to left adrenal nodule  Oncology consult  Pulmonology consult  Palliative care consult  Smoking cessation  MRI brain and bone scan ordered by oncology  IR consult for lung biopsy   Continue unasyn for post-obstructive pneumonia, can be discharged on augmentin for 14 day course per pulm  Metastatic disease (Multi)  As above  Nausea and vomiting, unspecified vomiting type  Likely related to malignancy  Resolved   Left atrial mass  CT imaging showing thrombus in the left atrium and left ventricle  Continue heparin drip  Echo showing EF 55-60%, diastolic dysfunction, and moderately sized left atrial thrombus   Cardiology consulted  Started on coumadin by cardiology - will bridge with heparin and stop heparin gtt when INR is therapeutic  Prolonged Q-T interval on ECG  QTc 616 on EKG, avoid QT prolonging medication.  Type 2 diabetes mellitus  HbA1c 6.6%  Continue SSI  Hypoglycemia protocol  Hypokalemia  Replace PRN  Acute anemia  GI consulted  Stool occult negative  Likely related to malignancy  Continue PPI BID  Hypomagnesemia  Replace PRN  Hyponatremia  Hydrochlorothiazide discontinued  Resolved   Elevated troponin  Flat troponins 15->15, type II MI demand ischemia  Lactic acidosis  Suspect due to volume  depletion  Resolved   Sinus tachycardia  Suspect due to volume depletion  Resolved   Adrenal nodule  Incidental finding  Follow up oncology and palliative care evaluations  Consider nephrology referral at discharge for further workup   Hypothyroidism  TSH normal  Continue Synthroid  Endometrial hyperplasia  Outpatient follow-up with gynecology, will need nonemergent ultrasound  Tobacco use  Greater than 120-pack-year smoking history, recommend smoking cessation.    Case discussed with Dr. Baker       Malnutrition Diagnosis Status: New  Malnutrition Diagnosis: Severe malnutrition related to chronic disease or condition  As Evidenced by: 23% weight loss in past year, PO intake <75% of estimated needs >1 month  I agree with the dietitian's malnutrition diagnosis.    Plan:  Potassium replaced   Urine culture negative for UTI  Awaiting bone scan, MRI brain, and biopsy of lung mass  PT/OT - cleared to work with therapy by cardiology   Discharge planning   Downgrade to [facility] this afternoon           Ina Lane MD

## 2024-10-22 NOTE — ASSESSMENT & PLAN NOTE
With metastatic disease  Greater than 120-pack-year smoking history  CT imaging with large multilobulated mass in the right upper lobe and jac with necrosis, enlarged mediastinal lymph nodes from metastasis, with suspected mass in the left atrium and ventricle, as well as left subpectoral mass and possible SVC involvement with narrowing, and suspected metastasis to left adrenal nodule  Oncology consult  Pulmonology consult  Palliative care consult  Smoking cessation  MRI brain and bone scan ordered by oncology  IR consult for lung biopsy   Continue unasyn for post-obstructive pneumonia, can be discharged on augmentin for 14 day course per pulm

## 2024-10-22 NOTE — NURSING NOTE
Assumed care of patient.  Resting quietly in bed, repositioned on L side.  No visible distress observed.  Respirations even and unlabored on RA.  Heparin gtt infusing as ordered.  Call light and commonly used items in reach.  Bed locked and in low position.

## 2024-10-22 NOTE — CARE PLAN
The patient's goals for the shift include      The clinical goals for the shift include maintain hemodynamic stability; pain mgmt;free from falls, safety    Over the shift, the patient did not make progress toward the following goals. Barriers to progression include . Recommendations to address these barriers include .

## 2024-10-22 NOTE — CARE PLAN
Problem: Respiratory  Goal: Clear secretions with interventions this shift  Outcome: Progressing  Goal: No signs of respiratory distress (eg. Use of accessory muscles. Peds grunting)  Outcome: Progressing  Goal: Verbalize decreased shortness of breath this shift  Outcome: Progressing

## 2024-10-22 NOTE — CARE PLAN
The patient's goals for the shift include  rest, pain management    The clinical goals for the shift include complete testing    Over the shift, the patient did not make progress toward the following goals. Barriers to progression include testing needs completed. Recommendations to address these barriers include schedule as able.

## 2024-10-22 NOTE — PROGRESS NOTES
Physical Therapy                 Therapy Communication Note    Patient Name: Dian Perez  MRN: [id number]  Department: [facility] 3 E  Room: 03/03-A  Today's Date: 10/22/2024     Discipline: Physical Therapy    Missed Visit Reason: Missed Visit Reason: Other (Comment) (waiting for Cardiology to confirm status of possible thrombus left atrium/ventricle and plan of care today.  Current PTT is therapeutic at 50.4. Cancel at this time.)    Missed Time: Cancel    Comment:

## 2024-10-23 ENCOUNTER — APPOINTMENT (OUTPATIENT)
Dept: RADIOLOGY | Facility: HOSPITAL | Age: 79
DRG: 180 | End: 2024-10-23
Payer: MEDICARE

## 2024-10-23 PROBLEM — J18.9 PNEUMONIA OF LEFT UPPER LOBE DUE TO INFECTIOUS ORGANISM: Status: ACTIVE | Noted: 2024-10-23

## 2024-10-23 LAB
ALBUMIN SERPL BCP-MCNC: 2.4 G/DL (ref 3.4–5)
ALP SERPL-CCNC: 33 U/L (ref 33–136)
ALT SERPL W P-5'-P-CCNC: <3 U/L (ref 7–45)
ANION GAP SERPL CALCULATED.3IONS-SCNC: 9 MMOL/L (ref 10–20)
APTT PPP: 54.7 SECONDS (ref 22–32.5)
AST SERPL W P-5'-P-CCNC: 10 U/L (ref 9–39)
BILIRUB SERPL-MCNC: 0.4 MG/DL (ref 0–1.2)
BUN SERPL-MCNC: 5 MG/DL (ref 6–23)
CALCIUM SERPL-MCNC: 8.1 MG/DL (ref 8.6–10.3)
CHLORIDE SERPL-SCNC: 99 MMOL/L (ref 98–107)
CO2 SERPL-SCNC: 30 MMOL/L (ref 21–32)
CREAT SERPL-MCNC: 0.53 MG/DL (ref 0.5–1.05)
EGFRCR SERPLBLD CKD-EPI 2021: >90 ML/MIN/1.73M*2
ERYTHROCYTE [DISTWIDTH] IN BLOOD BY AUTOMATED COUNT: 15.1 % (ref 11.5–14.5)
GLUCOSE BLD MANUAL STRIP-MCNC: 146 MG/DL (ref 74–99)
GLUCOSE BLD MANUAL STRIP-MCNC: 155 MG/DL (ref 74–99)
GLUCOSE BLD MANUAL STRIP-MCNC: 163 MG/DL (ref 74–99)
GLUCOSE BLD MANUAL STRIP-MCNC: 188 MG/DL (ref 74–99)
GLUCOSE BLD MANUAL STRIP-MCNC: 191 MG/DL (ref 74–99)
GLUCOSE SERPL-MCNC: 159 MG/DL (ref 74–99)
HCT VFR BLD AUTO: 30.1 % (ref 36–46)
HGB BLD-MCNC: 9.7 G/DL (ref 12–16)
INR PPP: 1.3 (ref 0.9–1.2)
MCH RBC QN AUTO: 30.2 PG (ref 26–34)
MCHC RBC AUTO-ENTMCNC: 32.2 G/DL (ref 32–36)
MCV RBC AUTO: 94 FL (ref 80–100)
NRBC BLD-RTO: 0 /100 WBCS (ref 0–0)
PLATELET # BLD AUTO: 303 X10*3/UL (ref 150–450)
POTASSIUM SERPL-SCNC: 3.5 MMOL/L (ref 3.5–5.3)
PROT SERPL-MCNC: 5.8 G/DL (ref 6.4–8.2)
PROTHROMBIN TIME: 13.4 SECONDS (ref 9.3–12.7)
RBC # BLD AUTO: 3.21 X10*6/UL (ref 4–5.2)
SODIUM SERPL-SCNC: 134 MMOL/L (ref 136–145)
WBC # BLD AUTO: 8.7 X10*3/UL (ref 4.4–11.3)

## 2024-10-23 PROCEDURE — 07D23ZX EXTRACTION OF LEFT NECK LYMPHATIC, PERCUTANEOUS APPROACH, DIAGNOSTIC: ICD-10-PCS | Performed by: RADIOLOGY

## 2024-10-23 PROCEDURE — 88341 IMHCHEM/IMCYTCHM EA ADD ANTB: CPT | Performed by: STUDENT IN AN ORGANIZED HEALTH CARE EDUCATION/TRAINING PROGRAM

## 2024-10-23 PROCEDURE — 85610 PROTHROMBIN TIME: CPT | Performed by: STUDENT IN AN ORGANIZED HEALTH CARE EDUCATION/TRAINING PROGRAM

## 2024-10-23 PROCEDURE — 2500000001 HC RX 250 WO HCPCS SELF ADMINISTERED DRUGS (ALT 637 FOR MEDICARE OP): Performed by: NURSE PRACTITIONER

## 2024-10-23 PROCEDURE — 2500000001 HC RX 250 WO HCPCS SELF ADMINISTERED DRUGS (ALT 637 FOR MEDICARE OP): Performed by: STUDENT IN AN ORGANIZED HEALTH CARE EDUCATION/TRAINING PROGRAM

## 2024-10-23 PROCEDURE — 94664 DEMO&/EVAL PT USE INHALER: CPT

## 2024-10-23 PROCEDURE — 2500000004 HC RX 250 GENERAL PHARMACY W/ HCPCS (ALT 636 FOR OP/ED): Performed by: STUDENT IN AN ORGANIZED HEALTH CARE EDUCATION/TRAINING PROGRAM

## 2024-10-23 PROCEDURE — 99233 SBSQ HOSP IP/OBS HIGH 50: CPT | Performed by: INTERNAL MEDICINE

## 2024-10-23 PROCEDURE — 36415 COLL VENOUS BLD VENIPUNCTURE: CPT | Performed by: STUDENT IN AN ORGANIZED HEALTH CARE EDUCATION/TRAINING PROGRAM

## 2024-10-23 PROCEDURE — 9420000001 HC RT PATIENT EDUCATION 5 MIN

## 2024-10-23 PROCEDURE — 2500000002 HC RX 250 W HCPCS SELF ADMINISTERED DRUGS (ALT 637 FOR MEDICARE OP, ALT 636 FOR OP/ED): Performed by: STUDENT IN AN ORGANIZED HEALTH CARE EDUCATION/TRAINING PROGRAM

## 2024-10-23 PROCEDURE — 88305 TISSUE EXAM BY PATHOLOGIST: CPT | Mod: TC,WESLAB | Performed by: INTERNAL MEDICINE

## 2024-10-23 PROCEDURE — 88189 FLOWCYTOMETRY/READ 16 & >: CPT | Performed by: PATHOLOGY

## 2024-10-23 PROCEDURE — 2720000007 HC OR 272 NO HCPCS

## 2024-10-23 PROCEDURE — 38505 NEEDLE BIOPSY LYMPH NODES: CPT

## 2024-10-23 PROCEDURE — 94640 AIRWAY INHALATION TREATMENT: CPT

## 2024-10-23 PROCEDURE — 82947 ASSAY GLUCOSE BLOOD QUANT: CPT

## 2024-10-23 PROCEDURE — 99233 SBSQ HOSP IP/OBS HIGH 50: CPT | Performed by: NURSE PRACTITIONER

## 2024-10-23 PROCEDURE — 99233 SBSQ HOSP IP/OBS HIGH 50: CPT | Performed by: STUDENT IN AN ORGANIZED HEALTH CARE EDUCATION/TRAINING PROGRAM

## 2024-10-23 PROCEDURE — 1200000002 HC GENERAL ROOM WITH TELEMETRY DAILY

## 2024-10-23 PROCEDURE — 2500000004 HC RX 250 GENERAL PHARMACY W/ HCPCS (ALT 636 FOR OP/ED): Performed by: RADIOLOGY

## 2024-10-23 PROCEDURE — 88305 TISSUE EXAM BY PATHOLOGIST: CPT | Performed by: STUDENT IN AN ORGANIZED HEALTH CARE EDUCATION/TRAINING PROGRAM

## 2024-10-23 PROCEDURE — 94668 MNPJ CHEST WALL SBSQ: CPT

## 2024-10-23 PROCEDURE — 84075 ASSAY ALKALINE PHOSPHATASE: CPT | Performed by: STUDENT IN AN ORGANIZED HEALTH CARE EDUCATION/TRAINING PROGRAM

## 2024-10-23 PROCEDURE — 88342 IMHCHEM/IMCYTCHM 1ST ANTB: CPT | Performed by: STUDENT IN AN ORGANIZED HEALTH CARE EDUCATION/TRAINING PROGRAM

## 2024-10-23 PROCEDURE — 85730 THROMBOPLASTIN TIME PARTIAL: CPT | Performed by: STUDENT IN AN ORGANIZED HEALTH CARE EDUCATION/TRAINING PROGRAM

## 2024-10-23 PROCEDURE — 85027 COMPLETE CBC AUTOMATED: CPT | Performed by: STUDENT IN AN ORGANIZED HEALTH CARE EDUCATION/TRAINING PROGRAM

## 2024-10-23 PROCEDURE — 99497 ADVNCD CARE PLAN 30 MIN: CPT | Performed by: NURSE PRACTITIONER

## 2024-10-23 PROCEDURE — 88185 FLOWCYTOMETRY/TC ADD-ON: CPT | Mod: TC,WESLAB | Performed by: INTERNAL MEDICINE

## 2024-10-23 PROCEDURE — 2500000001 HC RX 250 WO HCPCS SELF ADMINISTERED DRUGS (ALT 637 FOR MEDICARE OP): Performed by: INTERNAL MEDICINE

## 2024-10-23 RX ORDER — OXYMETAZOLINE HCL 0.05 %
2 SPRAY, NON-AEROSOL (ML) NASAL EVERY 12 HOURS PRN
Status: DISPENSED | OUTPATIENT
Start: 2024-10-23 | End: 2024-10-26

## 2024-10-23 RX ORDER — LIDOCAINE HYDROCHLORIDE 10 MG/ML
INJECTION, SOLUTION EPIDURAL; INFILTRATION; INTRACAUDAL; PERINEURAL
Status: COMPLETED | OUTPATIENT
Start: 2024-10-23 | End: 2024-10-23

## 2024-10-23 RX ORDER — BENZONATATE 100 MG/1
100 CAPSULE ORAL 3 TIMES DAILY PRN
Status: DISCONTINUED | OUTPATIENT
Start: 2024-10-23 | End: 2024-10-29

## 2024-10-23 RX ORDER — DEXAMETHASONE 6 MG/1
6 TABLET ORAL
Status: DISCONTINUED | OUTPATIENT
Start: 2024-10-23 | End: 2024-10-26

## 2024-10-23 SDOH — ECONOMIC STABILITY: HOUSING INSECURITY

## 2024-10-23 SDOH — ECONOMIC STABILITY: FOOD INSECURITY

## 2024-10-23 SDOH — ECONOMIC STABILITY: TRANSPORTATION INSECURITY

## 2024-10-23 SDOH — ECONOMIC STABILITY: GENERAL

## 2024-10-23 ASSESSMENT — COGNITIVE AND FUNCTIONAL STATUS - GENERAL
DRESSING REGULAR UPPER BODY CLOTHING: A LITTLE
DAILY ACTIVITIY SCORE: 18
MOBILITY SCORE: 17
DRESSING REGULAR LOWER BODY CLOTHING: A LITTLE
WALKING IN HOSPITAL ROOM: A LOT
TOILETING: A LITTLE
STANDING UP FROM CHAIR USING ARMS: A LITTLE
HELP NEEDED FOR BATHING: A LITTLE
EATING MEALS: A LITTLE
PERSONAL GROOMING: A LITTLE
CLIMB 3 TO 5 STEPS WITH RAILING: A LOT
TURNING FROM BACK TO SIDE WHILE IN FLAT BAD: A LITTLE
MOVING TO AND FROM BED TO CHAIR: A LITTLE

## 2024-10-23 ASSESSMENT — PAIN SCALES - GENERAL
PAINLEVEL_OUTOF10: 0 - NO PAIN
PAINLEVEL_OUTOF10: 7
PAINLEVEL_OUTOF10: 0 - NO PAIN
PAINLEVEL_OUTOF10: 0 - NO PAIN
PAINLEVEL_OUTOF10: 6
PAINLEVEL_OUTOF10: 0 - NO PAIN

## 2024-10-23 ASSESSMENT — PAIN - FUNCTIONAL ASSESSMENT
PAIN_FUNCTIONAL_ASSESSMENT: 0-10

## 2024-10-23 ASSESSMENT — PAIN DESCRIPTION - DESCRIPTORS
DESCRIPTORS: DISCOMFORT;DULL
DESCRIPTORS: DULL;DISCOMFORT

## 2024-10-23 NOTE — PROGRESS NOTES
MSW met with pt at bedside to discuss SNF placement. She is agreeable and [place] to her daughters. MSW will print up new list and deliver it to the pt early this afternoon.    10/23/24 1101   Discharge Planning   Who is requesting discharge planning? Provider   Home or Post Acute Services Post acute facilities (Rehab/SNF/etc)   Type of Post Acute Facility Services Skilled nursing  (List provided for local SNF despite pt [place])   Expected Discharge Disposition SNF  (Pending choice and precert)   Does the patient need discharge transport arranged? Yes   RoundTrip coordination needed? Yes   Patient Choice   Provider Choice list and CMS website (https://medicare.gov/care-compare#search) for post-acute Quality and Resource Measure Data were provided and reviewed with: Patient

## 2024-10-23 NOTE — PROGRESS NOTES
"Dian Perez is a 79 y.o. female on day 2 of admission presenting with Nausea and vomiting, unspecified vomiting type.    Subjective   Has stable productive cough but no chest pain or hemoptysis       Objective     Physical Exam  Constitutional:       General: She is not in acute distress.     Appearance: She is ill-appearing. She is not toxic-appearing.      Comments: Sleeping, awakens easily to voice   HENT:      Head: Normocephalic.      Mouth/Throat:      Pharynx: Oropharynx is clear.   Eyes:      General: No scleral icterus.  Cardiovascular:      Rate and Rhythm: Normal rate.   Pulmonary:      Effort: No respiratory distress.      Breath sounds: No wheezing.   Abdominal:      General: There is no distension.      Palpations: Abdomen is soft.   Musculoskeletal:      Right lower leg: No edema.      Left lower leg: No edema.   Psychiatric:         Behavior: Behavior normal.      Last Recorded Vitals  Blood pressure (!) 122/37, pulse 91, temperature 36.7 °C (98.1 °F), temperature source Oral, resp. rate 16, height 1.702 m (5' 7\"), weight 75.8 kg (167 lb 1.7 oz), SpO2 94%.  Intake/Output last 3 Shifts:  I/O last 3 completed shifts:  In: 1170 (15.4 mL/kg) [P.O.:880; I.V.:90 (1.2 mL/kg); IV Piggyback:200]  Out: 1650 (21.8 mL/kg) [Urine:1650 (0.6 mL/kg/hr)]  Weight: 75.8 kg     Relevant Results  Bone scan IMPRESSION:  Limited exam due to suboptimal patient positioning and lack of  regional skeletal images. Within this limitation there is no evidence  of osseous metastatic disease.      Assessment/Plan   Assessment & Plan  Nausea and vomiting, unspecified vomiting type    Tobacco use    Mass of upper lobe of right lung    Hilar mass    Metastatic disease (Multi)    Endometrial hyperplasia    Hypokalemia    Hyponatremia    Hypomagnesemia    Lactic acidosis    Elevated troponin    Type 2 diabetes mellitus    Acute anemia    Prolonged Q-T interval on ECG    Sinus tachycardia    Adrenal nodule    Left atrial " mass    Hypothyroidism    Bone scan has suboptimal images but there is no evidence of bone metastases. MRI brain will be done tonight. LN biopsy is planned for tomorrow by IR.       I spent 60 minutes in the professional and overall care of this patient.      Sherlyn Graham MD

## 2024-10-23 NOTE — PROGRESS NOTES
Occupational Therapy                 Therapy Communication Note    Patient Name: Dian Perez  MRN: [id number]  Department: [address]  Room: [address] [place] [address]  Today's Date: 10/23/2024     Discipline: Occupational Therapy    Missed Visit Reason: Missed Visit Reason: Patient refused (treatment attempted with pt pleasantly declining at this time d/t fatigue + procedure later this date.)    Missed Time: Attempt    Comment:

## 2024-10-23 NOTE — PROGRESS NOTES
Dian Perez is a 79 y.o. female on day 3 of admission presenting with Nausea and vomiting, unspecified vomiting type.      Subjective   Feels tired this morning. Reports sinus drainage and cough. Cough is wet but nonproductive. She is upset that she's NPO. Feels thirsty.        Objective     Last Recorded Vitals  /52   Pulse 90   Temp 36.6 °C (97.9 °F) (Oral)   Resp 14   Wt 75.8 kg (167 lb 1.7 oz)   SpO2 93%   Intake/Output last 3 Shifts:    Intake/Output Summary (Last 24 hours) at 10/23/2024 1218  Last data filed at 10/23/2024 0915  Gross per 24 hour   Intake 818.08 ml   Output 750 ml   Net 68.08 ml       Admission Weight  Weight: 77.1 kg (170 lb) (10/20/24 1520)    Daily Weight  10/22/24 : 75.8 kg (167 lb 1.7 oz)    Image Results  MR brain w and wo IV contrast  Narrative: Interpreted By:  Pietro Kaur,   STUDY:  MR BRAIN W AND WO IV CONTRAST; ;  10/22/2024 7:11 pm      INDICATION:  Signs/Symptoms:rule out brain mets.          COMPARISON:  None.      ACCESSION NUMBER(S):  [id number]      ORDERING CLINICIAN:  NOLAN SORTO      TECHNIQUE:  MRI of the brain was performed with the acquisition of axial  diffusion-weighted, axial T1, axial FLAIR, axial T2 gradient echo,  axial T2 fat saturated, axial T1 fat saturated postcontrast sequence,  and volumetric axial T1 post contrast sequence with multiplanar  reformats.      Contrast: 16 mL of Dotarem was injected intravenously.      FINDINGS:  There is no acute intracranial hemorrhage or infarct. There are 3  small lesions located within the left centrum semiovale and within  the left occipital white matter which demonstrate increased signal on  the diffusion-weighted sequence but without signal dropout on the ADC  map which is most consistent with T2 shine through.      There is a punctate focus of enhancement located within the right  parietal lobe gray-white matter junction (series 900, image 123 of  190) which is highly suspicious for a metastasis. There  are 2 rim  enhancing lesions with internal regions of non enhancement located  within the right cerebellum seen on series 900 images 55 and 59 which  measure 5 and 8 mm respectively. These lesions are most consistent  with metastases. There is mild parenchymal vasogenic edema within the  surrounding brain parenchyma with no mass effect.      There is a 6 mm nodular focus of enhancement along the right  occipital lobe (series 900, image 90 of 190) which is highly  suspicious for a dural-based metastasis, alternatively, could reflect  a small meningioma. There is diffuse dural enhancement along the  bilateral cerebral convexities which is suspicious for neoplastic  infiltration.      There are multiple regions of T2 hyperintensity within the bilateral  cerebral hemispheric white matter which are probably sequela of  chronic small vessel ischemic changes.      Ventricles, sulci, and basilar cisterns are normal in size, shape,  and configuration for patient's age.      There is mild mucosal thickening located within the left maxillary  sinus and within the ethmoid air cells.      Impression: 1. There are 3 enhancing lesions located within the right parietal  lobe and within the right cerebellum which are most consistent with  metastases. The largest of these lesions is located within the right  cerebellum and measures 8 mm.      2. There is mild vasogenic edema located within the right cerebellum  surrounding the metastases causing no striking mass effect.      3. Subcentimeter enhancing mass arising from the right occipital lobe  dura is most consistent with a metastasis versus a meningioma.      4. There is diffuse dural enhancement which is suspicious for  neoplastic infiltration, however other disease processes such as  infectious or inflammatory infiltration of the meninges or reactive  changes from intracranial hypotension can also cause similar  appearance. There are no other imaging findings to  suggest  intracranial hypotension. Sequela of recent lumbar puncture can also  cause diffuse dural enhancement, correlate clinically.      This study was interpreted at [facility].      MACRO:  None      Signed by: Pietro Kaur 10/23/2024 10:15 AM  Dictation workstation:   [id number]      Physical Exam  Constitutional:       Appearance: She is ill-appearing.   HENT:      Head: Normocephalic.      Mouth/Throat:      Mouth: Mucous membranes are dry.      Pharynx: Oropharynx is clear.   Eyes:      General: No scleral icterus.  Cardiovascular:      Rate and Rhythm: Normal rate.   Pulmonary:      Effort: No respiratory distress.      Breath sounds: No wheezing.   Abdominal:      General: There is no distension.      Palpations: Abdomen is soft.   Musculoskeletal:      Right lower leg: No edema.      Left lower leg: No edema.   Neurological:      Mental Status: She is alert.   Psychiatric:         Behavior: Behavior normal.         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  Greater than 120-pack-year smoking history  CT imaging with large multilobulated mass in the right upper lobe and jac with necrosis, enlarged mediastinal lymph nodes from metastasis, with suspected mass in the left atrium and ventricle, as well as left subpectoral mass and possible SVC involvement with narrowing, and suspected metastasis to left adrenal nodule  Oncology consult  Pulmonology consult  Palliative care consult  Smoking cessation  Bone scan without evidence of metastasis  MRI brain showing 3 lesions within the right parietal lobe and right ccerebellum consistent with metastasis, mild vasogenic edema in the right cerebellum, subcentimeter mass arising from the right occipital lobe dura consistent with metastasis vs meningioma, and diffuse dural enhancement suspicious for neoplastic infiltration  IR consult for  lymph node biopsy   Continue unasyn for post-obstructive pneumonia, can be discharged on augmentin for 14 day course per pulm  Metastatic disease (Multi)  As above  Nausea and vomiting, unspecified vomiting type  Likely related to malignancy  Resolved   Left atrial mass  CT imaging showing thrombus in the left atrium and left ventricle  Continue heparin drip  Echo showing EF 55-60%, diastolic dysfunction, and moderately sized left atrial thrombus   Cardiology consulted  Started on coumadin by cardiology - will bridge with heparin and stop heparin gtt when INR is therapeutic  Prolonged Q-T interval on ECG  QTc 616 on EKG, avoid QT prolonging medication.  Type 2 diabetes mellitus  HbA1c 6.6%  Continue SSI  Hypoglycemia protocol  Hypokalemia  Replace PRN  Acute anemia  GI consulted  Stool occult negative  Likely related to malignancy  Continue PPI BID  Hypomagnesemia  Replace PRN  Hyponatremia  Hydrochlorothiazide discontinued  Resolved   Elevated troponin  Flat troponins 15->15, type II MI demand ischemia  Lactic acidosis  Suspect due to volume depletion  Resolved   Sinus tachycardia  Suspect due to volume depletion  Resolved   Adrenal nodule  Incidental finding  Follow up oncology and palliative care evaluations  Consider nephrology referral at discharge for further workup   Hypothyroidism  TSH normal  Continue Synthroid  Endometrial hyperplasia  Outpatient follow-up with gynecology, will need nonemergent ultrasound  Tobacco use  Greater than 120-pack-year smoking history, recommend smoking cessation.    Case discussed with Dr. Graham and Jimena Kan NP.     Plan:  Lymph node biopsy this afternoon with IR  Continue heparin gtt until INR is therapeutic  Continue unasyn, switch to augmentin at discharge  PT/OT  Discharge planning               Ina Lane MD

## 2024-10-23 NOTE — CARE PLAN
Problem: Nutrition  Goal: Less than 5 days NPO/clear liquids  Outcome: Progressing  Goal: Oral intake greater than 50%  Outcome: Progressing  Goal: Oral intake greater 75%  Outcome: Progressing  Goal: Consume prescribed supplement  Outcome: Progressing  Goal: Adequate PO fluid intake  Outcome: Progressing  Goal: Nutrition support goals are met within 48 hrs  Outcome: Progressing  Goal: Nutrition support is meeting 75% of nutrient needs  Outcome: Progressing  Goal: Tube feed tolerance  Outcome: Progressing  Goal: BG  mg/dL  Outcome: Progressing  Goal: Lab values WNL  Outcome: Progressing  Goal: Electrolytes WNL  Outcome: Progressing  Goal: Promote healing  Outcome: Progressing  Goal: Maintain stable weight  Outcome: Progressing  Goal: Reduce weight from edema/fluid  Outcome: Progressing  Goal: Gradual weight gain  Outcome: Progressing  Goal: Improve ostomy output  Outcome: Progressing     Problem: Pain  Goal: Takes deep breaths with improved pain control throughout the shift  Outcome: Progressing  Goal: Turns in bed with improved pain control throughout the shift  Outcome: Progressing  Goal: Walks with improved pain control throughout the shift  Outcome: Progressing  Goal: Performs ADL's with improved pain control throughout shift  Outcome: Progressing  Goal: Participates in PT with improved pain control throughout the shift  Outcome: Progressing  Goal: Free from opioid side effects throughout the shift  Outcome: Progressing  Goal: Free from acute confusion related to pain meds throughout the shift  Outcome: Progressing     Problem: Fall/Injury  Goal: Not fall by end of shift  Outcome: Progressing  Goal: Be free from injury by end of the shift  Outcome: Progressing  Goal: Verbalize understanding of personal risk factors for fall in the hospital  Outcome: Progressing  Goal: Verbalize understanding of risk factor reduction measures to prevent injury from fall in the home  Outcome: Progressing  Goal: Use  assistive devices by end of the shift  Outcome: Progressing  Goal: Pace activities to prevent fatigue by end of the shift  Outcome: Progressing     Problem: Skin  Goal: Decreased wound size/increased tissue granulation at next dressing change  Outcome: Progressing  Goal: Participates in plan/prevention/treatment measures  Outcome: Progressing  Goal: Prevent/manage excess moisture  Outcome: Progressing  Goal: Prevent/minimize sheer/friction injuries  Outcome: Progressing  Goal: Promote/optimize nutrition  Outcome: Progressing  Goal: Promote skin healing  Outcome: Progressing     Problem: Pain - Adult  Goal: Verbalizes/displays adequate comfort level or baseline comfort level  Outcome: Progressing     Problem: Safety - Adult  Goal: Free from fall injury  Outcome: Progressing     Problem: Discharge Planning  Goal: Discharge to home or other facility with appropriate resources  Outcome: Progressing     Problem: Chronic Conditions and Co-morbidities  Goal: Patient's chronic conditions and co-morbidity symptoms are monitored and maintained or improved  Outcome: Progressing     Problem: Diabetes  Goal: Achieve decreasing blood glucose levels by end of shift  Outcome: Progressing  Goal: Increase stability of blood glucose readings by end of shift  Outcome: Progressing  Goal: Decrease in ketones present in urine by end of shift  Outcome: Progressing  Goal: Maintain electrolyte levels within acceptable range throughout shift  Outcome: Progressing  Goal: Maintain glucose levels >70mg/dl to <250mg/dl throughout shift  Outcome: Progressing  Goal: No changes in neurological exam by end of shift  Outcome: Progressing  Goal: Learn about and adhere to nutrition recommendations by end of shift  Outcome: Progressing  Goal: Vital signs within normal range for age by end of shift  Outcome: Progressing  Goal: Increase self care and/or family involovement by end of shift  Outcome: Progressing  Goal: Receive DSME education by end of  shift  Outcome: Progressing     Problem: Respiratory  Goal: Clear secretions with interventions this shift  Outcome: Progressing  Goal: Minimal/no exertional discomfort or dyspnea this shift  Outcome: Progressing  Goal: No signs of respiratory distress (eg. Use of accessory muscles. Peds grunting)  Outcome: Progressing  Goal: Tolerate pulmonary toileting this shift  Outcome: Progressing  Goal: Verbalize decreased shortness of breath this shift  Outcome: Progressing  Goal: Increase self care and/or family involvement in next 24 hours  Outcome: Progressing

## 2024-10-23 NOTE — ASSESSMENT & PLAN NOTE
Post obstructive.   Continue unasyn.  Can transition to augmentin at discharge for a total 14 day course.    Applied

## 2024-10-23 NOTE — CARE PLAN
Problem: Respiratory  Goal: Clear secretions with interventions this shift  Outcome: Progressing  Goal: Minimal/no exertional discomfort or dyspnea this shift  Outcome: Progressing  Goal: No signs of respiratory distress (eg. Use of accessory muscles. Peds grunting)  Outcome: Progressing  Goal: Tolerate pulmonary toileting this shift  Outcome: Progressing  Goal: Verbalize decreased shortness of breath this shift  Outcome: Progressing

## 2024-10-23 NOTE — ASSESSMENT & PLAN NOTE
Improved  Persistent dizziness - may be related to cerebellar mets. Dexamethasone 6mg daily ordered.

## 2024-10-23 NOTE — PROGRESS NOTES
"Dian Perez is a 79 y.o. female on day 3 of admission presenting with Nausea and vomiting, unspecified vomiting type.    Subjective   Patient was in IR for biopsy  Her daughter is at bedside       Objective     Physical Exam    Last Recorded Vitals  Blood pressure 126/74, pulse (!) 114, temperature 36.6 °C (97.9 °F), temperature source Oral, resp. rate 18, height 1.702 m (5' 7\"), weight 75.8 kg (167 lb 1.7 oz), SpO2 94%.  Intake/Output last 3 Shifts:  I/O last 3 completed shifts:  In: 1570.4 (20.7 mL/kg) [P.O.:790; I.V.:380.4 (5 mL/kg); IV Piggyback:400]  Out: 950 (12.5 mL/kg) [Urine:950 (0.3 mL/kg/hr)]  Weight: 75.8 kg     Relevant Results                              Assessment/Plan   Assessment & Plan  Nausea and vomiting, unspecified vomiting type    Tobacco use    Mass of upper lobe of right lung    Hilar mass    Metastatic disease (Multi)    Endometrial hyperplasia    Hypokalemia    Hyponatremia    Hypomagnesemia    Lactic acidosis    Elevated troponin    Type 2 diabetes mellitus    Acute anemia    Prolonged Q-T interval on ECG    Sinus tachycardia    Adrenal nodule    Left atrial mass    Hypothyroidism    I discussed the MRI brain results with her daughter. I told her that there are multiple brain metastases and also there is a possibility of leptomeningeal carcinomatosis. This findings carry a much poorer prognosis. Her daughter thinks that she will refuse radiation therapy to her brain. And wants these findings to be discussed with her directly. I asked Dr. Lane to see her after she comes back from IR       I spent 30 minutes in the professional and overall care of this patient.      Sherlyn Graham MD      "

## 2024-10-23 NOTE — ASSESSMENT & PLAN NOTE
With metastatic disease  Greater than 120-pack-year smoking history  CT imaging with large multilobulated mass in the right upper lobe and jac with necrosis, enlarged mediastinal lymph nodes from metastasis, with suspected mass in the left atrium and ventricle, as well as left subpectoral mass and possible SVC involvement with narrowing, and suspected metastasis to left adrenal nodule  Oncology consult  Pulmonology consult  Palliative care consult  Smoking cessation  Bone scan without evidence of metastasis  MRI brain showing 3 lesions within the right parietal lobe and right ccerebellum consistent with metastasis, mild vasogenic edema in the right cerebellum, subcentimeter mass arising from the right occipital lobe dura consistent with metastasis vs meningioma, and diffuse dural enhancement suspicious for neoplastic infiltration  IR consult for lymph node biopsy   Continue unasyn for post-obstructive pneumonia, can be discharged on augmentin for 14 day course per pulm

## 2024-10-23 NOTE — CARE PLAN
Problem: Skin  Goal: Decreased wound size/increased tissue granulation at next dressing change  10/22/2024 2322 by Josette Shepard RN  Outcome: Progressing  10/22/2024 2322 by Josette Shepard RN  Outcome: Progressing     Problem: Skin  Goal: Participates in plan/prevention/treatment measures  10/22/2024 2322 by Josette Shepard RN  Outcome: Progressing  Flowsheets (Taken 10/22/2024 2322)  Participates in plan/prevention/treatment measures: Elevate heels  10/22/2024 2322 by Josette Shepard RN  Outcome: Progressing  Flowsheets (Taken 10/22/2024 2322)  Participates in plan/prevention/treatment measures: Elevate heels     Problem: Skin  Goal: Prevent/manage excess moisture  10/22/2024 2322 by Josette Shepard RN  Outcome: Progressing  10/22/2024 2322 by Josette Shepard RN  Outcome: Progressing     Problem: Skin  Goal: Prevent/minimize sheer/friction injuries  10/22/2024 2322 by Josette Shepard RN  Outcome: Progressing  Flowsheets (Taken 10/22/2024 2322)  Prevent/minimize sheer/friction injuries:   Use pull sheet   HOB 30 degrees or less  10/22/2024 2322 by Josette Shepard RN  Outcome: Progressing  Flowsheets (Taken 10/22/2024 2322)  Prevent/minimize sheer/friction injuries:   Use pull sheet   HOB 30 degrees or less     Problem: Skin  Goal: Promote/optimize nutrition  10/22/2024 2322 by Josette Shepard RN  Outcome: Progressing  10/22/2024 2322 by Josette Shepard RN  Outcome: Progressing     Problem: Skin  Goal: Promote skin healing  10/22/2024 2322 by Josette Shepard RN  Outcome: Progressing  10/22/2024 2322 by Josette Shepard RN  Outcome: Progressing     Problem: Pain - Adult  Goal: Verbalizes/displays adequate comfort level or baseline comfort level  10/22/2024 2322 by Josette Shepard RN  Outcome: Progressing  10/22/2024 2322 by Josette Shepard RN  Outcome: Progressing

## 2024-10-23 NOTE — PROGRESS NOTES
"Dian Perez is a 79 y.o. female on day 3 of admission presenting with Nausea and vomiting, unspecified vomiting type.    Subjective   MRI showed metastases to the brain - including cerebellum  Underwent core needle biopsy of clavicular lymph node today  Does not think she wants treatment for her cancer  Hospice consulted  Cough is slightly improved but still present         Objective     Physical Exam  Constitutional:       Appearance: She is ill-appearing.   HENT:      Head: Normocephalic and atraumatic.      Nose: Nose normal.      Mouth/Throat:      Mouth: Mucous membranes are moist.   Eyes:      Extraocular Movements: Extraocular movements intact.      Pupils: Pupils are equal, round, and reactive to light.   Cardiovascular:      Rate and Rhythm: Normal rate and regular rhythm.   Pulmonary:      Effort: Pulmonary effort is normal.      Breath sounds: Rhonchi present.   Abdominal:      General: Abdomen is flat. Bowel sounds are normal.      Palpations: Abdomen is soft.   Musculoskeletal:         General: No swelling or tenderness.   Skin:     General: Skin is warm and dry.   Neurological:      General: No focal deficit present.      Mental Status: She is alert and oriented to person, place, and time.   Psychiatric:         Mood and Affect: Mood normal.         Last Recorded Vitals  Blood pressure 126/74, pulse (!) 114, temperature 36.6 °C (97.9 °F), temperature source Oral, resp. rate 18, height 1.702 m (5' 7\"), weight 75.8 kg (167 lb 1.7 oz), SpO2 94%.  Intake/Output last 3 Shifts:  I/O last 3 completed shifts:  In: 1570.4 (20.7 mL/kg) [P.O.:790; I.V.:380.4 (5 mL/kg); IV Piggyback:400]  Out: 950 (12.5 mL/kg) [Urine:950 (0.3 mL/kg/hr)]  Weight: 75.8 kg     Relevant Results  Results for orders placed or performed during the hospital encounter of 10/20/24 (from the past 24 hours)   CBC   Result Value Ref Range    WBC 8.7 4.4 - 11.3 x10*3/uL    nRBC 0.0 0.0 - 0.0 /100 WBCs    RBC 2.72 (L) 4.00 - 5.20 x10*6/uL    " Hemoglobin 8.3 (L) 12.0 - 16.0 g/dL    Hematocrit 25.8 (L) 36.0 - 46.0 %    MCV 95 80 - 100 fL    MCH 30.5 26.0 - 34.0 pg    MCHC 32.2 32.0 - 36.0 g/dL    RDW 14.9 (H) 11.5 - 14.5 %    Platelets 287 150 - 450 x10*3/uL   POCT GLUCOSE   Result Value Ref Range    POCT Glucose 330 (H) 74 - 99 mg/dL   Comprehensive Metabolic Panel   Result Value Ref Range    Glucose 159 (H) 74 - 99 mg/dL    Sodium 134 (L) 136 - 145 mmol/L    Potassium 3.5 3.5 - 5.3 mmol/L    Chloride 99 98 - 107 mmol/L    Bicarbonate 30 21 - 32 mmol/L    Anion Gap 9 (L) 10 - 20 mmol/L    Urea Nitrogen 5 (L) 6 - 23 mg/dL    Creatinine 0.53 0.50 - 1.05 mg/dL    eGFR >90 >60 mL/min/1.73m*2    Calcium 8.1 (L) 8.6 - 10.3 mg/dL    Albumin 2.4 (L) 3.4 - 5.0 g/dL    Alkaline Phosphatase 33 33 - 136 U/L    Total Protein 5.8 (L) 6.4 - 8.2 g/dL    AST 10 9 - 39 U/L    Bilirubin, Total 0.4 0.0 - 1.2 mg/dL    ALT <3 (L) 7 - 45 U/L   Protime-INR   Result Value Ref Range    Protime 13.4 (H) 9.3 - 12.7 seconds    INR 1.3 (H) 0.9 - 1.2   APTT   Result Value Ref Range    aPTT 54.7 (H) 22.0 - 32.5 seconds   POCT GLUCOSE   Result Value Ref Range    POCT Glucose 155 (H) 74 - 99 mg/dL   POCT GLUCOSE   Result Value Ref Range    POCT Glucose 146 (H) 74 - 99 mg/dL   POCT GLUCOSE   Result Value Ref Range    POCT Glucose 163 (H) 74 - 99 mg/dL   CBC   Result Value Ref Range    WBC 8.7 4.4 - 11.3 x10*3/uL    nRBC 0.0 0.0 - 0.0 /100 WBCs    RBC 3.21 (L) 4.00 - 5.20 x10*6/uL    Hemoglobin 9.7 (L) 12.0 - 16.0 g/dL    Hematocrit 30.1 (L) 36.0 - 46.0 %    MCV 94 80 - 100 fL    MCH 30.2 26.0 - 34.0 pg    MCHC 32.2 32.0 - 36.0 g/dL    RDW 15.1 (H) 11.5 - 14.5 %    Platelets 303 150 - 450 x10*3/uL   POCT GLUCOSE   Result Value Ref Range    POCT Glucose 188 (H) 74 - 99 mg/dL                            Assessment/Plan   Assessment & Plan  Nausea and vomiting, unspecified vomiting type  Improved  Persistent dizziness - may be related to cerebellar mets. Dexamethasone 6mg daily  ordered.  Pneumonia of left upper lobe due to infectious organism  Post obstructive.   Continue unasyn.  Can transition to augmentin at discharge for a total 14 day course.   Tobacco use  Does not want replacement therapy  Mass of upper lobe of right lung  Biopsy of LN done today for diagnosis  Metastatic disease (Multi)  Pt does not think she wants treatment - hospice consulted  Hypokalemia  improving  Hyponatremia  improving  Hypomagnesemia  resolved  Lactic acidosis  Secondary to fluid depletion - resolved  Elevated troponin  resolved  Acute anemia  Likely secondary to malignancy  Prolonged Q-T interval on ECG  Avoid qt prolonging agents  Left atrial mass  Thrombus - on anticoagulation  Hypothyroidism  On relpletion           Emiliana Nails MD PhD

## 2024-10-23 NOTE — CARE PLAN
The patient's goals for the shift include      The clinical goals for the shift include fall prevention, increase mobility, pain management      Problem: Nutrition  Goal: Less than 5 days NPO/clear liquids  Outcome: Progressing  Goal: Oral intake greater than 50%  Outcome: Progressing  Goal: Oral intake greater 75%  Outcome: Progressing  Goal: Consume prescribed supplement  Outcome: Progressing  Goal: Adequate PO fluid intake  Outcome: Progressing  Goal: Nutrition support goals are met within 48 hrs  Outcome: Progressing  Goal: Nutrition support is meeting 75% of nutrient needs  Outcome: Progressing  Goal: Tube feed tolerance  Outcome: Progressing  Goal: BG  mg/dL  Outcome: Progressing  Goal: Lab values WNL  Outcome: Progressing  Goal: Electrolytes WNL  Outcome: Progressing  Goal: Promote healing  Outcome: Progressing  Goal: Maintain stable weight  Outcome: Progressing  Goal: Reduce weight from edema/fluid  Outcome: Progressing  Goal: Gradual weight gain  Outcome: Progressing  Goal: Improve ostomy output  Outcome: Progressing     Problem: Pain  Goal: Takes deep breaths with improved pain control throughout the shift  Outcome: Progressing  Goal: Turns in bed with improved pain control throughout the shift  Outcome: Progressing  Goal: Walks with improved pain control throughout the shift  Outcome: Progressing  Goal: Performs ADL's with improved pain control throughout shift  Outcome: Progressing  Goal: Participates in PT with improved pain control throughout the shift  Outcome: Progressing  Goal: Free from opioid side effects throughout the shift  Outcome: Progressing  Goal: Free from acute confusion related to pain meds throughout the shift  Outcome: Progressing     Problem: Fall/Injury  Goal: Not fall by end of shift  Outcome: Progressing  Goal: Be free from injury by end of the shift  Outcome: Progressing  Goal: Verbalize understanding of personal risk factors for fall in the hospital  Outcome:  Progressing  Goal: Verbalize understanding of risk factor reduction measures to prevent injury from fall in the home  Outcome: Progressing  Goal: Use assistive devices by end of the shift  Outcome: Progressing  Goal: Pace activities to prevent fatigue by end of the shift  Outcome: Progressing     Problem: Skin  Goal: Decreased wound size/increased tissue granulation at next dressing change  Outcome: Progressing  Flowsheets (Taken 10/21/2024 0628 by Jimena Wong, RN)  Decreased wound size/increased tissue granulation at next dressing change: Promote sleep for wound healing  Goal: Participates in plan/prevention/treatment measures  Outcome: Progressing  Flowsheets (Taken 10/22/2024 2322 by Josette Shepard RN)  Participates in plan/prevention/treatment measures: Elevate heels  Goal: Prevent/manage excess moisture  Outcome: Progressing  Goal: Prevent/minimize sheer/friction injuries  Outcome: Progressing  Goal: Promote/optimize nutrition  Outcome: Progressing  Goal: Promote skin healing  Outcome: Progressing     Problem: Pain - Adult  Goal: Verbalizes/displays adequate comfort level or baseline comfort level  Outcome: Progressing  Flowsheets (Taken 10/23/2024 1803)  Verbalizes/displays adequate comfort level or baseline comfort level:   Encourage patient to monitor pain and request assistance   Assess pain using appropriate pain scale   Administer analgesics based on type and severity of pain and evaluate response   Implement non-pharmacological measures as appropriate and evaluate response   Consider cultural and social influences on pain and pain management   Notify Licensed Independent Practitioner if interventions unsuccessful or patient reports new pain     Problem: Safety - Adult  Goal: Free from fall injury  Outcome: Progressing  Flowsheets (Taken 10/23/2024 1804)  Free from fall injury:   Instruct family/caregiver on patient safety   Based on caregiver fall risk screen, instruct family/caregiver to ask for  assistance with transferring infant if caregiver noted to have fall risk factors     Problem: Discharge Planning  Goal: Discharge to home or other facility with appropriate resources  Outcome: Progressing  Flowsheets (Taken 10/23/2024 1809)  Discharge to home or other facility with appropriate resources:   Identify barriers to discharge with patient and caregiver   Arrange for needed discharge resources and transportation as appropriate   Identify discharge learning needs (meds, wound care, etc)   Refer to discharge planning if patient needs post-hospital services based on physician order or complex needs related to functional status, cognitive ability or social support system     Problem: Chronic Conditions and Co-morbidities  Goal: Patient's chronic conditions and co-morbidity symptoms are monitored and maintained or improved  Outcome: Progressing  Flowsheets (Taken 10/23/2024 1809)  Care Plan - Patient's Chronic Conditions and Co-Morbidity Symptoms are Monitored and Maintained or Improved:   Collaborate with multidisciplinary team to address chronic and comorbid conditions and prevent exacerbation or deterioration   Monitor and assess patient's chronic conditions and comorbid symptoms for stability, deterioration, or improvement   Update acute care plan with appropriate goals if chronic or comorbid symptoms are exacerbated and prevent overall improvement and discharge     Problem: Diabetes  Goal: Achieve decreasing blood glucose levels by end of shift  Outcome: Progressing  Flowsheets (Taken 10/23/2024 1809)  Achieve decreasing blood glucose levels by end of shift: Med administration/monitoring of effect  Goal: Increase stability of blood glucose readings by end of shift  Outcome: Progressing  Flowsheets (Taken 10/23/2024 1809)  Increase stability of blood glucose readings by end of shift: Med administration/monitoring of effect  Goal: Decrease in ketones present in urine by end of shift  Outcome:  Progressing  Flowsheets (Taken 10/23/2024 1809)  Decrease in ketones present in urine by end of shift:   Med administration/monitoring of effect   Monitor urine output  Goal: Maintain electrolyte levels within acceptable range throughout shift  Outcome: Progressing  Flowsheets (Taken 10/23/2024 1809)  Maintain electrolyte levels within acceptable range throughout shift:   Med administration/monitoring of effect   Monitor urine output  Goal: Maintain glucose levels >70mg/dl to <250mg/dl throughout shift  Outcome: Progressing  Flowsheets (Taken 10/23/2024 1809)  Maintain glucose levels >70mg/dl to <250mg/dl throughout shift: Med administration/monitoring of effect  Goal: No changes in neurological exam by end of shift  Outcome: Progressing  Flowsheets (Taken 10/23/2024 1809)  No changes in neurological exam by end of shift: Complete frequent neurological assessments  Goal: Learn about and adhere to nutrition recommendations by end of shift  Outcome: Progressing  Flowsheets (Taken 10/23/2024 1809)  Learn about and adhere to nutrition recommendations by end of shift: Ensure/encourage compliance with appropriate diet  Goal: Vital signs within normal range for age by end of shift  Outcome: Progressing  Flowsheets (Taken 10/23/2024 1809)  Vital signs within normal range for age by end of shift: Med administration/monitoring of effect  Goal: Increase self care and/or family involovement by end of shift  Outcome: Progressing  Flowsheets (Taken 10/23/2024 1809)  Increase self care and/or family involovement by end of shift: Self monitor blood glucose with staff oversight  Goal: Receive DSME education by end of shift  Outcome: Progressing  Flowsheets (Taken 10/23/2024 1809)  Receive DSME education by end of shift: Provide patient centered education on Diabetic Self Management Education     Problem: Respiratory  Goal: Clear secretions with interventions this shift  Outcome: Progressing  Flowsheets (Taken 10/23/2024 1809)  Clear  secretions with interventions this shift:   Encourage/provide pulmonary hygiene/secretion clearance   Incentive spirometry   Med administration/monitoring of effect  Goal: Minimal/no exertional discomfort or dyspnea this shift  Outcome: Progressing  Flowsheets (Taken 10/23/2024 1809)  Minimal/no exertional discomfort or dyspnea this shift: Positioning to promote ventilation/comfort  Goal: No signs of respiratory distress (eg. Use of accessory muscles. Peds grunting)  Outcome: Progressing  Goal: Tolerate pulmonary toileting this shift  Outcome: Progressing  Flowsheets (Taken 10/23/2024 1809)  Tolerate pulmonary toileting this shift: Positioning to promote ventilation/comfort  Goal: Verbalize decreased shortness of breath this shift  Outcome: Progressing  Flowsheets (Taken 10/23/2024 1809)  Verbalize decreased shortness of breath this shift:   Incentive spirometry   Encourage/provide pulmonary hygiene/secretion clearance  Goal: Increase self care and/or family involvement in next 24 hours  Outcome: Progressing  Flowsheets (Taken 10/23/2024 1809)  Increase self care and/or family involvement in next 24 hours:   Asthma home management plan   Encourage activity/mobility

## 2024-10-23 NOTE — PROGRESS NOTES
Subjective Data:  Patient underlying lung cancer now with a left atrial thrombus.  Currently on IV heparin as well as a p.o. Coumadin.  Plan of coughing.  Discussed with the daughter at the bedside about the conservative treatment plan as well as risk of stroke.  Also discussed with the daughter about embolization of the thrombus to downstream vascular territory.  Overnight Events:    None  Objective   Last Recorded Vitals  /52   Pulse 90   Temp 36.6 °C (97.9 °F) (Oral)   Resp 14   Wt 75.8 kg (167 lb 1.7 oz)   SpO2 93%     Intake/Output Summary (Last 24 hours) at 10/23/2024 1326  Last data filed at 10/23/2024 0915  Gross per 24 hour   Intake 818.08 ml   Output 750 ml   Net 68.08 ml     Physical Exam:  HEENT: Normocephalic/atraumatic pupils equal react light  Neck exam mild JVD, no bruit  Lung exam clear to auscultation, few crackles at the bases  Cardiac exam is regular rhythm S1-S2, soft systolic murmur heard.   Abdomen soft nontender, nondistended  Extremities no clubbing, cyanosis, trace edema  Neuro exam grossly intact.  Image Results  MR brain w and wo IV contrast  Narrative: Interpreted By:  Pietro Kaur,   STUDY:  MR BRAIN W AND WO IV CONTRAST; ;  10/22/2024 7:11 pm      INDICATION:  Signs/Symptoms:rule out brain mets.          COMPARISON:  None.      ACCESSION NUMBER(S):  [id number]      ORDERING CLINICIAN:  NOLAN SORTO      TECHNIQUE:  MRI of the brain was performed with the acquisition of axial  diffusion-weighted, axial T1, axial FLAIR, axial T2 gradient echo,  axial T2 fat saturated, axial T1 fat saturated postcontrast sequence,  and volumetric axial T1 post contrast sequence with multiplanar  reformats.      Contrast: 16 mL of Dotarem was injected intravenously.      FINDINGS:  There is no acute intracranial hemorrhage or infarct. There are 3  small lesions located within the left centrum semiovale and within  the left occipital white matter which demonstrate increased signal on  the  diffusion-weighted sequence but without signal dropout on the ADC  map which is most consistent with T2 shine through.      There is a punctate focus of enhancement located within the right  parietal lobe gray-white matter junction (series 900, image 123 of  190) which is highly suspicious for a metastasis. There are 2 rim  enhancing lesions with internal regions of non enhancement located  within the right cerebellum seen on series 900 images 55 and 59 which  measure 5 and 8 mm respectively. These lesions are most consistent  with metastases. There is mild parenchymal vasogenic edema within the  surrounding brain parenchyma with no mass effect.      There is a 6 mm nodular focus of enhancement along the right  occipital lobe (series 900, image 90 of 190) which is highly  suspicious for a dural-based metastasis, alternatively, could reflect  a small meningioma. There is diffuse dural enhancement along the  bilateral cerebral convexities which is suspicious for neoplastic  infiltration.      There are multiple regions of T2 hyperintensity within the bilateral  cerebral hemispheric white matter which are probably sequela of  chronic small vessel ischemic changes.      Ventricles, sulci, and basilar cisterns are normal in size, shape,  and configuration for patient's age.      There is mild mucosal thickening located within the left maxillary  sinus and within the ethmoid air cells.      Impression: 1. There are 3 enhancing lesions located within the right parietal  lobe and within the right cerebellum which are most consistent with  metastases. The largest of these lesions is located within the right  cerebellum and measures 8 mm.      2. There is mild vasogenic edema located within the right cerebellum  surrounding the metastases causing no striking mass effect.      3. Subcentimeter enhancing mass arising from the right occipital lobe  dura is most consistent with a metastasis versus a meningioma.      4. There is  diffuse dural enhancement which is suspicious for  neoplastic infiltration, however other disease processes such as  infectious or inflammatory infiltration of the meninges or reactive  changes from intracranial hypotension can also cause similar  appearance. There are no other imaging findings to suggest  intracranial hypotension. Sequela of recent lumbar puncture can also  cause diffuse dural enhancement, correlate clinically.      This study was interpreted at [place].      MACRO:  None      Signed by: Pietro Kaur 10/23/2024 10:15 AM  Dictation workstation:   [id number]    Last Labs:  CBC - 10/22/2024:  9:00 PM  8.7 8.3 287    25.8      CMP - 10/23/2024:  5:56 AM  8.1 5.8 10 --- 0.4   _ 2.4 <3 33      PTT - 10/23/2024:  5:56 AM  1.3   13.4 54.7     Inpatient Medications:  Scheduled medications   Medication Dose Route Frequency    ampicillin-sulbactam  3 g intravenous q6h    budesonide  0.5 mg nebulization BID    fluticasone  2 spray Each Nostril Daily    folic acid  1 mg oral Daily    formoterol  20 mcg nebulization BID    insulin lispro  0-10 Units subcutaneous TID    levothyroxine  150 mcg oral Daily    metoprolol succinate XL  50 mg oral BID    pantoprazole  40 mg intravenous BID    polyethylene glycol  17 g oral Daily    potassium chloride CR  20 mEq oral Daily    warfarin  5 mg oral Daily     Principal Problem:    Nausea and vomiting, unspecified vomiting type  Active Problems:    Tobacco use    Mass of upper lobe of right lung    Hilar mass    Metastatic disease (Multi)    Endometrial hyperplasia    Hypokalemia    Hyponatremia    Hypomagnesemia    Lactic acidosis    Elevated troponin    Type 2 diabetes mellitus    Acute anemia    Prolonged Q-T interval on ECG    Sinus tachycardia    Adrenal nodule    Left atrial mass    Hypothyroidism    Assessment/Plan   Patient with lung cancer without metastatic disease.  Now with MRI brain showing 3 lesion within the right  parietal lobe.  Currently metastatic.  Status post left atrial thrombus on IV heparin protocol.  Continue current Coumadin goal to keep INR between 2 and 3.  Left atrial thrombus: As above aggressive anticoagulation.  Only medical therapy the morning.  Discussed with the patient and family about treatment plan:.  Poor long-term prognosis.  Will sign off.    Pt. care time is spent includes review of diagnostic tests, labs, radiographs, EKGs, old echoes, cardiac work-up and coordination of care. Assessment, impression and plans are reflected in the note above as well as the orders.    Code Status:  DNR and No Intubation and No ICU  I spent 45 minutes in the professional and overall care of this patient.  Konrad Baker MD       room air

## 2024-10-23 NOTE — PROGRESS NOTES
Dian Perez is a 79 y.o. female on day 3 of admission presenting with Nausea and vomiting, unspecified vomiting type.    Subjective   Symptoms (0 - 10, Best to Worst)  Calvin Symptom Assessment System  0-10 (Numeric) Pain Score: 0 - No pain  CO pain, last dose tramadol 10/22 1700.        Objective     Vitals and nursing note reviewed.   Constitutional:       General: She is not in acute distress.     Appearance: She is ill-appearing.      Comments: Appears stated age, chronically ill appearing, lying in bed, no nonverbal signs of distress   HENT:      Head: Normocephalic and atraumatic.      Comments: alopecia     Nose: Congestion and rhinorrhea present.      Mouth/Throat:      Mouth: Mucous membranes are moist.      Pharynx: Oropharynx is clear.   Eyes:      General: No scleral icterus.        Right eye: No discharge.         Left eye: No discharge.      Extraocular Movements: Extraocular movements intact.      Pupils: Pupils are equal, round, and reactive to light.   Neck:      Comments: Limited flexion, rotation  Cardiovascular:      Rate and Rhythm: Normal rate and regular rhythm.      Pulses: Normal pulses.      Heart sounds: No murmur heard.  Pulmonary:      Effort: Pulmonary effort is normal. No respiratory distress.      Breath sounds: Rhonchi present.      Comments: RA, moist nonprod cough, diminished RUL  Abdominal:      General: Abdomen is flat. Bowel sounds are normal. There is no distension.      Palpations: Abdomen is soft.      Tenderness: There is abdominal tenderness.      Comments: Epigastric tenderness   Musculoskeletal:         General: Tenderness present. No swelling, deformity or signs of injury. Normal range of motion.      Cervical back: Normal range of motion and neck supple. Tenderness present.      Right lower leg: No edema.      Left lower leg: No edema.   Skin:     General: Skin is warm and dry.      Capillary Refill: Capillary refill takes 2 to 3 seconds.      Coloration: Skin is pale.  "  Neurological:      General: No focal deficit present.      Mental Status: She is alert and oriented to person, place, and time.      Sensory: No sensory deficit.      Motor: Weakness present.      Coordination: Coordination normal.   Psychiatric:         Mood and Affect: Mood normal.         Behavior: Behavior normal.        Last Recorded Vitals  Blood pressure 111/65, pulse (!) 116, temperature 36.6 °C (97.9 °F), temperature source Oral, resp. rate 16, height 1.702 m (5' 7\"), weight 75.8 kg (167 lb 1.7 oz), SpO2 92%.  Intake/Output last 3 Shifts:  I/O last 3 completed shifts:  In: 1570.4 (20.7 mL/kg) [P.O.:790; I.V.:380.4 (5 mL/kg); IV Piggyback:400]  Out: 950 (12.5 mL/kg) [Urine:950 (0.3 mL/kg/hr)]  Weight: 75.8 kg     Relevant Results  Results for orders placed or performed during the hospital encounter of 10/20/24 (from the past 24 hours)   POCT GLUCOSE   Result Value Ref Range    POCT Glucose 124 (H) 74 - 99 mg/dL   CBC   Result Value Ref Range    WBC 8.7 4.4 - 11.3 x10*3/uL    nRBC 0.0 0.0 - 0.0 /100 WBCs    RBC 2.72 (L) 4.00 - 5.20 x10*6/uL    Hemoglobin 8.3 (L) 12.0 - 16.0 g/dL    Hematocrit 25.8 (L) 36.0 - 46.0 %    MCV 95 80 - 100 fL    MCH 30.5 26.0 - 34.0 pg    MCHC 32.2 32.0 - 36.0 g/dL    RDW 14.9 (H) 11.5 - 14.5 %    Platelets 287 150 - 450 x10*3/uL   POCT GLUCOSE   Result Value Ref Range    POCT Glucose 330 (H) 74 - 99 mg/dL   Comprehensive Metabolic Panel   Result Value Ref Range    Glucose 159 (H) 74 - 99 mg/dL    Sodium 134 (L) 136 - 145 mmol/L    Potassium 3.5 3.5 - 5.3 mmol/L    Chloride 99 98 - 107 mmol/L    Bicarbonate 30 21 - 32 mmol/L    Anion Gap 9 (L) 10 - 20 mmol/L    Urea Nitrogen 5 (L) 6 - 23 mg/dL    Creatinine 0.53 0.50 - 1.05 mg/dL    eGFR >90 >60 mL/min/1.73m*2    Calcium 8.1 (L) 8.6 - 10.3 mg/dL    Albumin 2.4 (L) 3.4 - 5.0 g/dL    Alkaline Phosphatase 33 33 - 136 U/L    Total Protein 5.8 (L) 6.4 - 8.2 g/dL    AST 10 9 - 39 U/L    Bilirubin, Total 0.4 0.0 - 1.2 mg/dL    ALT <3 " (L) 7 - 45 U/L   Protime-INR   Result Value Ref Range    Protime 13.4 (H) 9.3 - 12.7 seconds    INR 1.3 (H) 0.9 - 1.2   APTT   Result Value Ref Range    aPTT 54.7 (H) 22.0 - 32.5 seconds   POCT GLUCOSE   Result Value Ref Range    POCT Glucose 155 (H) 74 - 99 mg/dL   POCT GLUCOSE   Result Value Ref Range    POCT Glucose 146 (H) 74 - 99 mg/dL   POCT GLUCOSE   Result Value Ref Range    POCT Glucose 163 (H) 74 - 99 mg/dL    Transthoracic Echo (TTE) Complete    Result Date: 10/22/2024           [facility] [id number] [place] [address]            Phone [phone] TRANSTHORACIC ECHOCARDIOGRAM REPORT Patient Name:     ZION Mayer Physician:   [id number] Konrad Baker MD Study Date:       10/21/2024           Ordering Provider:   [id number] GEIGER MRN/PID:          [id number]             Fellow: Accession#:       [id number]         Nurse: Date of           1945 / 79 years  Sonographer:         Stacey Funes RDCS Birth/Age: Gender:           F                    Additional Staff: Height:           170.18 cm            Admit Date: Weight:           75.75 kg             Admission Status:    Inpatient - Routine BSA / BMI:        1.87 m2 / 26.16      Department Location: [facility]                   kg/m2 Blood Pressure: 116 /67 mmHg Study Type:    TRANSTHORACIC ECHO (TTE) COMPLETE Diagnosis/ICD: Intracardiac thrombosis, not elsewhere classified-I51.3 Indication:    mass in left atrium, and left ventricle CPT Codes:     Echo Complete w Full [id number] Patient History: Pertinent History: DM, metastic disease, LA mass, sinus tachy, elevated                    troponin, SOB. Study Detail: The following Echo studies were performed: 2D, M-Mode, Doppler and               color flow. Definity used as a contrast agent for endocardial               border definition. Total contrast used for this procedure was 2 mL                via IV push.  Critical Event Critical Event: Test was completed as per department protocol. Critical Finding: Mass in the left atrium. Time Test was Completed: 1:35:05 PM Notified: DR. Baker. Attending notification time: 1:41:00 AM  PHYSICIAN INTERPRETATION: Left Ventricle: Left ventricular ejection fraction is normal, by visual estimate at 55-60%. There are no regional wall motion abnormalities. The left ventricular cavity size is normal. There is mild concentric left ventricular hypertrophy involving the basal wall and septal wall. Spectral Doppler shows an impaired relaxation pattern of left ventricular diastolic filling. Left Atrium: The left atrium is upper limits of normal in size. There is a moderately sized fixed left atrial thrombus The left atrial thrombus is spherical and irregular in shape. More likely thrombus than metastatic tumor in the left atrium. Right Ventricle: The right ventricle is normal in size. There is normal right ventricular global systolic function. Right Atrium: The right atrium is upper limits of normal in size. Possible small thrombus in the right atrium. Aortic Valve: The aortic valve is trileaflet. There is evidence of mildly elevated transaortic gradients consistent with sclerosis of the aortic valve. The aortic valve dimensionless index is 0.90. There is trace aortic valve regurgitation. The peak instantaneous gradient of the aortic valve is 6.0 mmHg. The mean gradient of the aortic valve is 3.3 mmHg. Mitral Valve: The mitral valve is normal in structure. There is trace mitral valve regurgitation. Tricuspid Valve: The tricuspid valve is structurally normal. There is trace to mild tricuspid regurgitation. Pulmonic Valve: The pulmonic valve is not well visualized. There is trace pulmonic valve regurgitation. Pericardium: No pericardial effusion noted. Aorta: The aortic root is normal.  CONCLUSIONS:  1. Left ventricular ejection fraction is normal, by visual estimate at 55-60%.  2.  Spectral Doppler shows an impaired relaxation pattern of left ventricular diastolic filling.  3. There is normal right ventricular global systolic function.  4. Moderately sized left atrial thrombus.  5. Aortic valve sclerosis.  6. More likely thrombus than metastatic tumor in the left atrium. QUANTITATIVE DATA SUMMARY:  2D MEASUREMENTS:           Normal Ranges: LAs:             4.55 cm   (2.7-4.0cm) IVSd:            1.06 cm   (0.6-1.1cm) LVPWd:           0.72 cm   (0.6-1.1cm) LVIDd:           3.91 cm   (3.9-5.9cm) LVIDs:           2.85 cm LV Mass Index:   55.7 g/m2 LV % FS          27.2 %  LA VOLUME:                    Normal Ranges: LA Vol A4C:        48.6 ml    (22+/-6mL/m2) LA Vol A2C:        40.0 ml LA Vol BP:         46.2 ml LA Vol Index A4C:  26.0 ml/m2 LA Vol Index A2C:  21.4 ml/m2 LA Vol Index BP:   24.7 ml/m2 LA Area A4C:       17.9 cm2 LA Area A2C:       15.5 cm2 LA Major Axis A4C: 5.6 cm LA Major Axis A2C: 5.1 cm LA Volume Index:   24.7 ml/m2 LA Vol A4C:        48.0 ml LA Vol A2C:        40.0 ml LA Vol Index BSA:  23.5 ml/m2  RA VOLUME BY A/L METHOD:            Normal Ranges: RA Vol A4C:              42.3 ml    (8.3-19.5ml) RA Vol Index A4C:        22.6 ml/m2 RA Area A4C:             16.7 cm2 RA Major Axis A4C:       5.6 cm  M-MODE MEASUREMENTS:         Normal Ranges: LAs:                 4.20 cm (2.7-4.0cm)  AORTA MEASUREMENTS:         Normal Ranges: Ao Sinus, d:        3.20 cm (2.1-3.5cm) Ao STJ, d:          3.00 cm (1.7-3.4cm) Asc Ao, d:          3.10 cm (2.1-3.4cm)  LV SYSTOLIC FUNCTION BY 2D PLANIMETRY (MOD):                      Normal Ranges: EF-A4C View:    54 % (>=55%) EF-A2C View:    60 % EF-Biplane:     58 % EF-Visual:      58 % LV EF Reported: 58 %  LV DIASTOLIC FUNCTION:           Normal Ranges: MV Peak E:             0.74 m/s  (0.7-1.2 m/s) MV Peak A:             0.81 m/s  (0.42-0.7 m/s) E/A Ratio:             0.92      (1.0-2.2) MV e'                  0.080 m/s (>8.0) MV lateral e'           0.09 m/s MV medial e'           0.07 m/s E/e' Ratio:            9.18      (<8.0)  MITRAL VALVE:          Normal Ranges: MV DT:        274 msec (150-240msec)  AORTIC VALVE:                     Normal Ranges: AoV Vmax:                1.22 m/s (<=1.7m/s) AoV Peak P.0 mmHg (<20mmHg) AoV Mean PG:             3.3 mmHg (1.7-11.5mmHg) LVOT Max Jeancarlos:            1.08 m/s (<=1.1m/s) AoV VTI:                 21.74 cm (18-25cm) LVOT VTI:                19.64 cm LVOT Diameter:           2.24 cm  (1.8-2.4cm) AoV Area, VTI:           3.57 cm2 (2.5-5.5cm2) AoV Area,Vmax:           3.48 cm2 (2.5-4.5cm2) AoV Dimensionless Index: 0.90  RIGHT VENTRICLE: RV Basal 4.05 cm RV Major 6.3 cm TAPSE:   23.0 mm RV s'    0.16 m/s  TRICUSPID VALVE/RVSP:          Normal Ranges: Peak TR Velocity:     2.20 m/s RV Syst Pressure:     22 mmHg  (< 30mmHg) IVC Diam:             1.75 cm  AORTA: Asc Ao Diam 3.13 cm  [id number] Konrad Baker MD Electronically signed on 10/22/2024 at 8:07:52 AM  ** Final **     ECG 12 Lead    Result Date: 10/21/2024  Sinus tachycardia with occasional Premature ventricular complexes Nonspecific T wave abnormality Prolonged QT Abnormal ECG No previous ECGs available Confirmed by Pablito Caraballo ([id number]) on 10/21/2024 12:09:13 PM    CT chest abdomen pelvis w IV contrast    Result Date: 10/20/2024  Interpreted By:  Linda Stafford, STUDY: CT CHEST ABDOMEN PELVIS W IV CONTRAST;  10/20/2024 5:30 pm   INDICATION: Signs/Symptoms:n/v.   COMPARISON: None.   ACCESSION NUMBER(S): [id number]   ORDERING CLINICIAN: LUCY HOLLAND   TECHNIQUE: Axial CT images of the chest, abdomen and pelvis with coronal and sagittal reconstructed images obtained after intravenous administration of 75 mL of Omnipaque 350.   FINDINGS: CHEST:   VESSELS: Aorta is normal caliber. Atherosclerotic changes in the aorta and coronary arteries. HEART: Normal size. No pericardial effusion. There is a lobulated 2.8 x 3.6 x 2.8 cm mass in the left atrium which  appears contiguous with large subcarinal hypoattenuating mass described below. MEDIASTINUM AND MONROE: Multiple enlarged mediastinal lymph nodes are present, largest in the subcarinal region demonstrates central hypoattenuation measuring 3.6 x 5.3 cm concerning for central necrosis. There is loss of fat plane with the adjacent esophagus as well as left ventricle concerning for local invasion. Additional enlarged and prominent thoracic lymph nodes noted. LUNG, PLEURA, LARGE AIRWAYS: There is a multilobulated centrally hypointense mass in the right upper lobe and monroe measuring approximately 8.6 x 12.3 x 8.3 cm. This demonstrates central foci of gas and hypoattenuation concerning for necrosis. There is marked narrowing of the right upper lobe pulmonary artery with occlusion of the right upper lobe pulmonary vein and bronchus. There is mass-effect and narrowing of the SVC. Findings are highly concerning for primary malignancy. Left lung is clear. No effusion or pneumothorax. CHEST WALL AND LOWER NECK: Subcentimeter hypodense nodule and coarse calcification in the right lobe of the thyroid gland is nonspecific. There is a heterogeneous enlarged left subpectoral mass measuring 3.8 x 3.8 cm. BONES: Generalized diffuse osteopenia. Multilevel degenerative changes of the spine. Bilateral shoulder osteoarthrosis.   ABDOMEN:   LIVER: Within normal limits. BILE DUCTS: Normal caliber. GALLBLADDER: Status post cholecystectomy. PANCREAS: Marked fatty atrophy of the pancreas. SPLEEN: Within normal limits. ADRENALS: Heterogeneous 3 cm left adrenal nodule concerning for metastases. Right adrenal glands within normal limits. KIDNEYS: Symmetric renal enhancement. No hydronephrosis or perinephric fluid collection. URETERS: No hydroureter.   VESSELS: Calcific atherosclerosis of the aortoiliac vessels. No aortic aneurysm. RETROPERITONEUM: Within normal limits.   PELVIS:   REPRODUCTIVE ORGANS: Anteverted uterus. The endometrial complex is  abnormally thickened measuring up to 11 mm. No adnexal mass. BLADDER: Within normal limits.   BOWEL: Stomach is under distended. Visualized loops of bowel are without evidence for obstruction. Appendix is not identified with certainty. No pericecal inflammatory changes seen. Mural stratification of the colon likely sequela of remote colitis. A few scattered colonic diverticula without evidence for acute diverticulitis. No pneumatosis or portal venous gas. PERITONEUM: No ascites or free air, no fluid collection.   ABDOMINAL WALL: Within normal limits. BONES: Generalized diffuse osteopenia. Multilevel degenerative changes of the spine. Mild S shaped scoliosis.       There is a large heterogeneous multilobulated mass in the right upper lobe and jac measuring approximately 8.6 x 12.3 x 8.3 cm. This demonstrates central foci of gas and hypoattenuation concerning for necrosis. There is marked narrowing of the right upper lobe pulmonary artery with occlusion of the right upper lobe pulmonary vein and bronchus. There is mass-effect and narrowing of the SVC. Findings are highly concerning for primary malignancy.   There are multiple enlarged mediastinal lymph nodes concerning for alton metastases. The largest in the subcarinal region demonstrates central hypoattenuation measuring 3.6 x 5.3 cm concerning for central necrosis. There is loss of fat plane with the adjacent esophagus and left ventricle with a lobulated 2.8 x 3.6 x 2.8 cm mass in the left atrium which is highly concerning for local invasion. Focal thrombus in the left ventricle not excluded. Further evaluation with echocardiogram is recommended.   There is a heterogeneous enlarged left subpectoral mass measuring 3.8 x 3.8 cm consistent with metastases.   Heterogeneous 3 cm left adrenal nodule is concerning for metastases.   The endometrial complex is abnormally thickened measuring up to 11 mm. Given patient's stated age differential considerations include  hyperplasia versus neoplasia. Gynecological consultation further evaluation with nonemergent ultrasound is advised.   Diverticulosis without evidence for acute diverticulitis. Mural stratification of the colon likely sequela of remote colitis. Correlate with symptomatology if there is concern for superimposed early colitis.   Additional findings as described above.   MACRO: Linda Stafford discussed the significance and urgency of this critical finding by telephone with  LUCY HOLLAND on 10/20/2024 at 6:36 pm. (**-RCF-**) Findings:  See findings.   Signed by: Linda Stafford 10/20/2024 6:44 PM Dictation workstation:   [id number]    XR chest 1 view    Result Date: 10/20/2024  Interpreted By:  Divina Garcia, STUDY: XR CHEST 1 VIEW; ;  10/20/2024 4:03 pm   INDICATION: Signs/Symptoms:weakness.     COMPARISON: 01/08/2008   ACCESSION NUMBER(S): [id number]   ORDERING CLINICIAN: LUCY HOLLAND   FINDINGS: There is a 9 x 9 cm mass like airspace opacity in the right upper lung zone silhouetting the right perihilar region. Left lung is relatively clear. No large pleural effusions within limits of portable technique. No large pneumothorax. No acute osseous findings.       9 x 9 cm masslike airspace opacity in the right upper lung zone is concerning neoplastic process. Recommend further evaluation with CT chest with contrast.     MACRO: Critical Finding:  See findings. Notification was initiated on 10/20/2024 at 4:40 pm by Dr. Divina Garcia.  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   Prolonged QTC interval  Hypokalemia/Hyponatremia  Anemia  Debility  OA Pain  Palliative Care     DNRCCA/DNI/No ICU  Capable  Has 3 children, Kenia, Kamille and estranged son  No advanced  directives.    10/23  Attempted to assist patient with signing HPOA/LW today, signing halted by daughter Kamille. Patient and daughter Kamille to discuss further as patient had initially wanted her daughter Kenia as Primary POA. I have asked SRINIVAS Aguilar to follow up.   Discussed updates with daughter Kamille after patient gave permission.   Spoke to attending service, new brain mets with vasogenic edema, prognosis poor. Would like to get hospice informational meeting set up as patient does not want any treatment for her cancer. Will need to follow up with patient after biopsy today at 1400.     Plan still remains to medically stabilize, then discharge to SNF to optimize function, but will then transition to LTC, likely with hospice for supportive care.      Family working on DPOA as they will need to assist with spenddown and medicaid.     Addendum: Came back to bedside after biopsy done. Pain uncontrolled with tramadol 25mg, increased dose to 50mg prn. Daughter Kamille at bedside. Patient/daughter requested to review scan results. I conveyed that MRI did show brain mets. Patient very quickly and clearly after hearing results stated that she wanted no treatment of her cancer. I did inquire about hospice informational meeting during our talk and both were in agreement. Patient requested I reach out to daughter Kenia to provide update, sent message requesting call back.     10/22  Encouraged use of prn tramadol low dose and tylenol to assist with pain control, encouraged hydration. Awaiting bone scan, mri brain and lung biopsy.   Planning for SNF when medically stable, then consideration for hospice if she does not want any treatment.   Needs HPOA and LW completed. LSW consulted.     10/21  Patient very clear that she does not want any treatment for her likely malignancy, however she is agreeable to lung biopsy to confirm diagnosis and she is ok with bone scan, MRI brain to assist with prognostication. She values quality of  life and confesses that her quality of life currently is very poor. She is not afraid to die. She is bedbound at baseline and agrees with daughter that she cannot safely return home.   Discussed concerns and wishes with attending and daughter. Plan to medically stablize, pursue lung biopsy while inpatient. Then plan for rehab to optimize physical function. Likely will require placement after rehab, with hospice for supportive care. Care coordination involved, will need family choices for SNF. We initiated conversations about hospice referral, will follow up tomorrow for patient/family decision. Daughter planning to contact [employer] to get simple will and DPOA. I provided copy of HPOA and LW for daughter/patient to review, will follow up tomorrow to sign/witness.      Treatment model of care, plan for SNF when medically stable, will then likely transition to LTC with hospice.     I spent 1hr 10 minutes in the professional and overall care of this patient. Spent over 40min with daughter and patient in acp discussion.      Jimena Kan, APRN-CNP

## 2024-10-24 ENCOUNTER — DOCUMENTATION (OUTPATIENT)
Dept: RESEARCH | Age: 79
End: 2024-10-24
Payer: MEDICARE

## 2024-10-24 LAB
ALBUMIN SERPL BCP-MCNC: 2.5 G/DL (ref 3.4–5)
ALP SERPL-CCNC: 41 U/L (ref 33–136)
ALT SERPL W P-5'-P-CCNC: 5 U/L (ref 7–45)
ANION GAP SERPL CALCULATED.3IONS-SCNC: 11 MMOL/L (ref 10–20)
APTT PPP: 54 SECONDS (ref 22–32.5)
APTT PPP: 70.7 SECONDS (ref 22–32.5)
APTT PPP: 71.1 SECONDS (ref 22–32.5)
AST SERPL W P-5'-P-CCNC: 13 U/L (ref 9–39)
BILIRUB SERPL-MCNC: 0.4 MG/DL (ref 0–1.2)
BUN SERPL-MCNC: 5 MG/DL (ref 6–23)
CALCIUM SERPL-MCNC: 8.5 MG/DL (ref 8.6–10.3)
CHLORIDE SERPL-SCNC: 95 MMOL/L (ref 98–107)
CO2 SERPL-SCNC: 28 MMOL/L (ref 21–32)
CREAT SERPL-MCNC: 0.52 MG/DL (ref 0.5–1.05)
EGFRCR SERPLBLD CKD-EPI 2021: >90 ML/MIN/1.73M*2
ERYTHROCYTE [DISTWIDTH] IN BLOOD BY AUTOMATED COUNT: 15.1 % (ref 11.5–14.5)
GLUCOSE BLD MANUAL STRIP-MCNC: 180 MG/DL (ref 74–99)
GLUCOSE BLD MANUAL STRIP-MCNC: 380 MG/DL (ref 74–99)
GLUCOSE BLD MANUAL STRIP-MCNC: 389 MG/DL (ref 74–99)
GLUCOSE SERPL-MCNC: 258 MG/DL (ref 74–99)
HCT VFR BLD AUTO: 28.6 % (ref 36–46)
HGB BLD-MCNC: 9.3 G/DL (ref 12–16)
INR PPP: 1.8 (ref 0.9–1.2)
MCH RBC QN AUTO: 30.4 PG (ref 26–34)
MCHC RBC AUTO-ENTMCNC: 32.5 G/DL (ref 32–36)
MCV RBC AUTO: 94 FL (ref 80–100)
NRBC BLD-RTO: 0 /100 WBCS (ref 0–0)
PLATELET # BLD AUTO: 280 X10*3/UL (ref 150–450)
POTASSIUM SERPL-SCNC: 4 MMOL/L (ref 3.5–5.3)
PROT SERPL-MCNC: 6.3 G/DL (ref 6.4–8.2)
PROTHROMBIN TIME: 18.2 SECONDS (ref 9.3–12.7)
RBC # BLD AUTO: 3.06 X10*6/UL (ref 4–5.2)
SODIUM SERPL-SCNC: 130 MMOL/L (ref 136–145)
WBC # BLD AUTO: 8.4 X10*3/UL (ref 4.4–11.3)

## 2024-10-24 PROCEDURE — 99497 ADVNCD CARE PLAN 30 MIN: CPT

## 2024-10-24 PROCEDURE — 82947 ASSAY GLUCOSE BLOOD QUANT: CPT

## 2024-10-24 PROCEDURE — 85610 PROTHROMBIN TIME: CPT | Performed by: STUDENT IN AN ORGANIZED HEALTH CARE EDUCATION/TRAINING PROGRAM

## 2024-10-24 PROCEDURE — 2500000001 HC RX 250 WO HCPCS SELF ADMINISTERED DRUGS (ALT 637 FOR MEDICARE OP): Performed by: NURSE PRACTITIONER

## 2024-10-24 PROCEDURE — 2500000002 HC RX 250 W HCPCS SELF ADMINISTERED DRUGS (ALT 637 FOR MEDICARE OP, ALT 636 FOR OP/ED): Performed by: STUDENT IN AN ORGANIZED HEALTH CARE EDUCATION/TRAINING PROGRAM

## 2024-10-24 PROCEDURE — 85730 THROMBOPLASTIN TIME PARTIAL: CPT | Performed by: STUDENT IN AN ORGANIZED HEALTH CARE EDUCATION/TRAINING PROGRAM

## 2024-10-24 PROCEDURE — 80053 COMPREHEN METABOLIC PANEL: CPT | Performed by: STUDENT IN AN ORGANIZED HEALTH CARE EDUCATION/TRAINING PROGRAM

## 2024-10-24 PROCEDURE — 99232 SBSQ HOSP IP/OBS MODERATE 35: CPT | Performed by: STUDENT IN AN ORGANIZED HEALTH CARE EDUCATION/TRAINING PROGRAM

## 2024-10-24 PROCEDURE — 2500000001 HC RX 250 WO HCPCS SELF ADMINISTERED DRUGS (ALT 637 FOR MEDICARE OP): Performed by: STUDENT IN AN ORGANIZED HEALTH CARE EDUCATION/TRAINING PROGRAM

## 2024-10-24 PROCEDURE — 2500000001 HC RX 250 WO HCPCS SELF ADMINISTERED DRUGS (ALT 637 FOR MEDICARE OP): Performed by: INTERNAL MEDICINE

## 2024-10-24 PROCEDURE — 36415 COLL VENOUS BLD VENIPUNCTURE: CPT | Performed by: STUDENT IN AN ORGANIZED HEALTH CARE EDUCATION/TRAINING PROGRAM

## 2024-10-24 PROCEDURE — 99232 SBSQ HOSP IP/OBS MODERATE 35: CPT | Performed by: INTERNAL MEDICINE

## 2024-10-24 PROCEDURE — 2500000004 HC RX 250 GENERAL PHARMACY W/ HCPCS (ALT 636 FOR OP/ED): Performed by: STUDENT IN AN ORGANIZED HEALTH CARE EDUCATION/TRAINING PROGRAM

## 2024-10-24 PROCEDURE — 94640 AIRWAY INHALATION TREATMENT: CPT

## 2024-10-24 PROCEDURE — 94668 MNPJ CHEST WALL SBSQ: CPT

## 2024-10-24 PROCEDURE — 85027 COMPLETE CBC AUTOMATED: CPT | Performed by: STUDENT IN AN ORGANIZED HEALTH CARE EDUCATION/TRAINING PROGRAM

## 2024-10-24 PROCEDURE — 9420000001 HC RT PATIENT EDUCATION 5 MIN

## 2024-10-24 PROCEDURE — 99233 SBSQ HOSP IP/OBS HIGH 50: CPT

## 2024-10-24 PROCEDURE — 1100000001 HC PRIVATE ROOM DAILY

## 2024-10-24 RX ORDER — PANTOPRAZOLE SODIUM 40 MG/1
40 TABLET, DELAYED RELEASE ORAL
Status: DISCONTINUED | OUTPATIENT
Start: 2024-10-24 | End: 2024-10-30 | Stop reason: HOSPADM

## 2024-10-24 RX ORDER — INSULIN LISPRO 100 [IU]/ML
0-15 INJECTION, SOLUTION INTRAVENOUS; SUBCUTANEOUS
Status: DISCONTINUED | OUTPATIENT
Start: 2024-10-24 | End: 2024-10-30 | Stop reason: HOSPADM

## 2024-10-24 ASSESSMENT — COGNITIVE AND FUNCTIONAL STATUS - GENERAL
MOVING TO AND FROM BED TO CHAIR: A LITTLE
DRESSING REGULAR LOWER BODY CLOTHING: A LITTLE
MOVING TO AND FROM BED TO CHAIR: A LITTLE
EATING MEALS: A LITTLE
EATING MEALS: A LITTLE
DRESSING REGULAR UPPER BODY CLOTHING: A LITTLE
TOILETING: A LITTLE
DRESSING REGULAR LOWER BODY CLOTHING: A LITTLE
TOILETING: A LITTLE
PERSONAL GROOMING: A LITTLE
HELP NEEDED FOR BATHING: A LITTLE
STANDING UP FROM CHAIR USING ARMS: A LITTLE
STANDING UP FROM CHAIR USING ARMS: A LITTLE
WALKING IN HOSPITAL ROOM: A LOT
DRESSING REGULAR UPPER BODY CLOTHING: A LITTLE
TURNING FROM BACK TO SIDE WHILE IN FLAT BAD: A LITTLE
EATING MEALS: A LITTLE
PERSONAL GROOMING: A LITTLE
HELP NEEDED FOR BATHING: A LITTLE
MOBILITY SCORE: 17
TURNING FROM BACK TO SIDE WHILE IN FLAT BAD: A LITTLE
DRESSING REGULAR UPPER BODY CLOTHING: A LITTLE
TOILETING: A LITTLE
MOBILITY SCORE: 17
HELP NEEDED FOR BATHING: A LITTLE
MOBILITY SCORE: 17
PERSONAL GROOMING: A LITTLE
DAILY ACTIVITIY SCORE: 18
DAILY ACTIVITIY SCORE: 18
WALKING IN HOSPITAL ROOM: A LOT
DRESSING REGULAR LOWER BODY CLOTHING: A LITTLE
CLIMB 3 TO 5 STEPS WITH RAILING: A LOT
WALKING IN HOSPITAL ROOM: A LOT
CLIMB 3 TO 5 STEPS WITH RAILING: A LOT
CLIMB 3 TO 5 STEPS WITH RAILING: A LOT
TURNING FROM BACK TO SIDE WHILE IN FLAT BAD: A LITTLE
DAILY ACTIVITIY SCORE: 18
STANDING UP FROM CHAIR USING ARMS: A LITTLE
MOVING TO AND FROM BED TO CHAIR: A LITTLE

## 2024-10-24 ASSESSMENT — PAIN SCALES - GENERAL
PAINLEVEL_OUTOF10: 7
PAINLEVEL_OUTOF10: 0 - NO PAIN
PAINLEVEL_OUTOF10: 2

## 2024-10-24 ASSESSMENT — PAIN SCALES - WONG BAKER: WONGBAKER_NUMERICALRESPONSE: NO HURT

## 2024-10-24 ASSESSMENT — PAIN - FUNCTIONAL ASSESSMENT
PAIN_FUNCTIONAL_ASSESSMENT: 0-10

## 2024-10-24 ASSESSMENT — PAIN DESCRIPTION - DESCRIPTORS: DESCRIPTORS: ACHING;DISCOMFORT;DULL

## 2024-10-24 NOTE — ASSESSMENT & PLAN NOTE
Oncology consult  Pulmonology consult  Palliative care consult  Smoking cessation  Bone scan without evidence of metastasis  MRI brain showing metastatic disease  Started on decadron   S/p lymph node biopsy  Continue unasyn for post-obstructive pneumonia, can be discharged on augmentin for 14 day course per pulm

## 2024-10-24 NOTE — NURSING NOTE
Assumed care of pt. Eyes closed, respirations even and unlabored. Heparin drip running. Call light within reach.

## 2024-10-24 NOTE — PROGRESS NOTES
Physical Therapy                 Therapy Communication Note    Patient Name: Dian Perez  MRN: [id number]  Department: [address]  Room: [address] [place], [place] [address]  Today's Date: 10/24/2024     Discipline: Physical Therapy    Cancel/Discharge PT services; spoke with patient regarding her current medical status. Patient wishing to no longer receive therapy during her hospital stay. Patient met with Hospice services. Will discontinue any further PT services per patient request.

## 2024-10-24 NOTE — PROGRESS NOTES
Occupational Therapy                 Therapy Communication Note    Patient Name: Dian Perez  MRN: [id number]  Department: [address]  Room: [address] [place], [place] [address]  Today's Date: 10/24/2024     Discipline: Occupational Therapy    Missed Visit Reason: Missed Visit Reason: Other (Comment) (Will discontinue OT services per patient request- Following conversation, patient wishing to no longer receive therapy during her hospital stay d/t declining status + hospice consult)    Missed Time: Cancel    Comment:   Problem: Goal Outcome Summary  Goal: Goal Outcome Summary  Outcome: No Change  A&Ox4. Bradycardic. O2 sats 94-96% on RA. Tele SB with BBB. Oxycodone given x3 for 6-8/10 L hip pain, with decrease in pain. Dressing CDI. Voiding adequately on bedpan. MD aware of bradycardia, cardiology consulted (see note). Dangled, per MD, patient not out of bed until consulted by cardiology. Family at bedside throughout shift. Continue to monitor.

## 2024-10-24 NOTE — ASSESSMENT & PLAN NOTE
Post obstructive.   Continue unasyn.  Can transition to augmentin at discharge for a total 14 day course.

## 2024-10-24 NOTE — PROGRESS NOTES
Medication Adjustment    The following medication(s) was/were adjusted for Dian Perez per protocol/policy due to [facility] approved/hospital use guidelines.    Medication(s) adjusted:   Pantoprazole IV to PO    Gayatri Pratt, PharmD

## 2024-10-24 NOTE — PROGRESS NOTES
"Nutrition Follow up Note    Nutrition Assessment      Pt reported that appetite is improving. Reported chronic diarrhea. Will continue to send mighty shakes.     Nutrition History:  Food and Nutrient History: pt reported good appetite today  Energy Intake: Good > 75 %  Food Allergies/Intolerances:  None      Anthropometrics:  Ht: 170.2 cm (5' 7\"), Wt: 75.8 kg (167 lb 1.7 oz), BMI: 26.17             Weight Change:  Daily Weight  10/22/24 : 75.8 kg (167 lb 1.7 oz)  04/04/24: 189 lbs    Weight History / % Weight Change: Chart shows a 22# (11.6%) over 6 months  Significant Weight Loss: Yes  Interpretation of Weight Loss: >10% in 6 months       Nutrition Focused Physical Exam Findings:                       Nutrition Significant Labs:  Lab Results   Component Value Date    WBC 8.4 10/24/2024    HGB 9.3 (L) 10/24/2024    HCT 28.6 (L) 10/24/2024     10/24/2024    ALT 5 (L) 10/24/2024    AST 13 10/24/2024     (L) 10/24/2024    K 4.0 10/24/2024    CL 95 (L) 10/24/2024    CREATININE 0.52 10/24/2024    BUN 5 (L) 10/24/2024    CO2 28 10/24/2024    TSH 0.61 10/20/2024    INR 1.8 (H) 10/24/2024    HGBA1C 6.6 (H) 10/20/2024     Nutrition Specific Medications:  ampicillin-sulbactam, 3 g, intravenous, q6h  budesonide, 0.5 mg, nebulization, BID  dexAMETHasone, 6 mg, oral, Daily with breakfast  fluticasone, 2 spray, Each Nostril, Daily  folic acid, 1 mg, oral, Daily  formoterol, 20 mcg, nebulization, BID  insulin lispro, 0-15 Units, subcutaneous, TID  insulin NPH (Isophane), 6 Units, subcutaneous, q12h ADRIAN  levothyroxine, 150 mcg, oral, Daily  metoprolol succinate XL, 50 mg, oral, BID  pantoprazole, 40 mg, oral, BID AC  polyethylene glycol, 17 g, oral, Daily  potassium chloride CR, 20 mEq, oral, Daily  warfarin, 5 mg, oral, Daily      heparin, 0-4,500 Units/hr, Last Rate: 700 Units/hr (10/24/24 1139)        Dietary Orders (From admission, onward)       Start     Ordered    10/24/24 1221  Adult diet Regular, Consistent " Carb; CCD 75 gm/meal  Diet effective now        Question Answer Comment   Diet type Regular    Diet type Consistent Carb    Carb diet selection: CCD 75 gm/meal        10/24/24 1221    10/21/24 1407  Oral nutritional supplements  Until discontinued        Comments: Any flavor   Question Answer Comment   Deliver with All meals    Select supplement: Sugar Free Mighty Shake        10/21/24 1406    10/20/24 2157  May Participate in Room Service  ( ROOM SERVICE MAY PARTICIPATE)  Once        Question:  .  Answer:  Yes    10/20/24 2156                  Estimated Needs:   Estimated Energy Needs  Total Energy Estimated Needs (kCal):  (6517-7502)  Total Estimated Energy Need per Day (kCal/kg):  (30-35)  Method for Estimating Needs: Actual Wt    Estimated Protein Needs  Total Protein Estimated Needs (g):  ()  Total Protein Estimated Needs (g/kg):  (1.2-1.5)  Method for Estimating Needs: Actual Wt    Estimated Fluid Needs  Total Fluid Estimated Needs (mL): 1900 mL  Total Fluid Estimated Needs (mL/kg): 25 mL/kg  Method for Estimating Needs: Actual Wt        Nutrition Diagnosis   Nutrition Diagnosis:  Malnutrition Diagnosis  Patient has Malnutrition Diagnosis: Yes  Diagnosis Status: Ongoing  Malnutrition Diagnosis: Severe malnutrition related to chronic disease or condition  As Evidenced by: 23% weight loss in past year, PO intake <75% of estimated needs >1 month            Nutrition Interventions/Recommendations   Nutrition Interventions and Recommendations:    Nutrition Prescription:  Individualized Nutrition Prescription Provided for : 1370-4284 calories,  gm protein to be provided via diet/nutritional supplements    Nutrition Interventions:   Food and/or Nutrient Delivery Interventions  Interventions: Meals and snacks, Medical food supplement  Meals and Snacks: Carbohydrate-modified diet  Goal: provide diet as ordered  Medical Food Supplement: Modified beverage  Goal: mighty shake TID to provide 200 kcals and 7g  protein each    Education Documentation  No documentation found.           Nutrition Monitoring and Evaluation   Monitoring/Evaluation:   Food/Nutrient Related History Monitoring  Monitoring and Evaluation Plan: Energy intake  Energy Intake: Estimated energy intake  Criteria: pt to consume >/= 75% estimated needs    Body Composition/Growth/Weight History  Monitoring and Evaluation Plan: Weight  Weight: Measured weight  Criteria: pt will maintain wt at this time    Biochemical Data, Medical Tests and Procedures  Monitoring and Evaluation Plan: Glucose/endocrine profile  Glucose/Endocrine Profile: Glucose, casual, Glucose, fasting, Hemoglobin A1c (HgbA1c)  Criteria: labs will trend towards desirable range         Time Spent/Follow-up:   Follow Up  Time Spent (min): 30 minutes  Last Date of Nutrition Visit: 10/24/24  Nutrition Follow-Up Needed?: 5-7 days  Follow up Comment: 10/30/24

## 2024-10-24 NOTE — PROGRESS NOTES
Dian Perez is a 79 y.o. female on day 4 of admission presenting with Nausea and vomiting, unspecified vomiting type.  Patient with h/o DM, HTN, arthritis, who p/w N/V. And vomiting associated with significant weight loss. Symptoms also included cough productive of thin clear sputum. In the ED found to have hypokalemia, hypomagnesemia, admitted to [facility] for further management. Also had a chest CT done that showed large lung mass invading to mediastinum with extensive mediastinal LAP. Pulmonary is consulted for the CT findings.   Subjective   No acute overnight events. Remains on RA.     Overall is feeling the same as yesterday. SOB and cough slightly better. Cough still productive of clear sputum. Also complains of non specific pain.   Objective   Scheduled medications  ampicillin-sulbactam, 3 g, intravenous, q6h  budesonide, 0.5 mg, nebulization, BID  dexAMETHasone, 6 mg, oral, Daily with breakfast  fluticasone, 2 spray, Each Nostril, Daily  folic acid, 1 mg, oral, Daily  formoterol, 20 mcg, nebulization, BID  insulin lispro, 0-15 Units, subcutaneous, TID  insulin NPH (Isophane), 6 Units, subcutaneous, q12h ADRIAN  levothyroxine, 150 mcg, oral, Daily  metoprolol succinate XL, 50 mg, oral, BID  pantoprazole, 40 mg, oral, BID AC  polyethylene glycol, 17 g, oral, Daily  potassium chloride CR, 20 mEq, oral, Daily  warfarin, 5 mg, oral, Daily    Continuous medications  heparin, 0-4,500 Units/hr, Last Rate: 700 Units/hr (10/24/24 1139)    PRN medications  PRN medications: acetaminophen **OR** acetaminophen **OR** acetaminophen, albuterol, benzonatate, dextrose, dextrose, glucagon, glucagon, heparin (porcine), LORazepam, meclizine, melatonin, oxymetazoline, traMADol   Physical Exam  Constitutional:       General: She is not in acute distress.     Appearance: She is obese. She is ill-appearing. She is not toxic-appearing.   HENT:      Head: Normocephalic and atraumatic.      Comments: Thinning hair.      Nose:      Comments: On  "RA.      Mouth/Throat:      Mouth: Mucous membranes are moist.      Comments: Mallampati 2-3.   Eyes:      General: No scleral icterus.     Extraocular Movements: Extraocular movements intact.      Pupils: Pupils are equal, round, and reactive to light.   Cardiovascular:      Rate and Rhythm: Normal rate and regular rhythm.      Heart sounds: No murmur heard.     No friction rub. No gallop.   Pulmonary:      Effort: Pulmonary effort is normal. No respiratory distress.      Breath sounds: Rales (R upper lung.) present. No wheezing.      Comments: Overall fair air entry.   Abdominal:      General: Bowel sounds are normal. There is no distension.      Palpations: Abdomen is soft.      Tenderness: There is no abdominal tenderness.   Musculoskeletal:         General: No tenderness. Normal range of motion.      Cervical back: Normal range of motion and neck supple. No rigidity or tenderness.      Right lower leg: No edema.      Left lower leg: No edema.   Lymphadenopathy:      Cervical: No cervical adenopathy.   Skin:     General: Skin is warm and dry.      Coloration: Skin is not jaundiced.   Neurological:      General: No focal deficit present.      Mental Status: She is alert and oriented to person, place, and time.      Cranial Nerves: No cranial nerve deficit.      Motor: No weakness.   Psychiatric:         Mood and Affect: Mood normal.         Behavior: Behavior normal.     Last Recorded Vitals  Blood pressure (!) 109/36, pulse 69, temperature 36.6 °C (97.9 °F), temperature source Oral, resp. rate 16, height 1.702 m (5' 7\"), weight 75.8 kg (167 lb 1.7 oz), SpO2 94%.  Intake/Output last 3 Shifts:  I/O last 3 completed shifts:  In: 1214.3 (16 mL/kg) [P.O.:400; I.V.:414.3 (5.5 mL/kg); IV Piggyback:400]  Out: 1200 (15.8 mL/kg) [Urine:1200 (0.4 mL/kg/hr)]  Weight: 75.8 kg     Relevant Results  Results for orders placed or performed during the hospital encounter of 10/20/24 (from the past 24 hours)   CBC   Result Value " Ref Range    WBC 8.7 4.4 - 11.3 x10*3/uL    nRBC 0.0 0.0 - 0.0 /100 WBCs    RBC 3.21 (L) 4.00 - 5.20 x10*6/uL    Hemoglobin 9.7 (L) 12.0 - 16.0 g/dL    Hematocrit 30.1 (L) 36.0 - 46.0 %    MCV 94 80 - 100 fL    MCH 30.2 26.0 - 34.0 pg    MCHC 32.2 32.0 - 36.0 g/dL    RDW 15.1 (H) 11.5 - 14.5 %    Platelets 303 150 - 450 x10*3/uL   POCT GLUCOSE   Result Value Ref Range    POCT Glucose 188 (H) 74 - 99 mg/dL   POCT GLUCOSE   Result Value Ref Range    POCT Glucose 191 (H) 74 - 99 mg/dL   Comprehensive Metabolic Panel   Result Value Ref Range    Glucose 258 (H) 74 - 99 mg/dL    Sodium 130 (L) 136 - 145 mmol/L    Potassium 4.0 3.5 - 5.3 mmol/L    Chloride 95 (L) 98 - 107 mmol/L    Bicarbonate 28 21 - 32 mmol/L    Anion Gap 11 10 - 20 mmol/L    Urea Nitrogen 5 (L) 6 - 23 mg/dL    Creatinine 0.52 0.50 - 1.05 mg/dL    eGFR >90 >60 mL/min/1.73m*2    Calcium 8.5 (L) 8.6 - 10.3 mg/dL    Albumin 2.5 (L) 3.4 - 5.0 g/dL    Alkaline Phosphatase 41 33 - 136 U/L    Total Protein 6.3 (L) 6.4 - 8.2 g/dL    AST 13 9 - 39 U/L    Bilirubin, Total 0.4 0.0 - 1.2 mg/dL    ALT 5 (L) 7 - 45 U/L   Protime-INR   Result Value Ref Range    Protime 18.2 (H) 9.3 - 12.7 seconds    INR 1.8 (H) 0.9 - 1.2   CBC   Result Value Ref Range    WBC 8.4 4.4 - 11.3 x10*3/uL    nRBC 0.0 0.0 - 0.0 /100 WBCs    RBC 3.06 (L) 4.00 - 5.20 x10*6/uL    Hemoglobin 9.3 (L) 12.0 - 16.0 g/dL    Hematocrit 28.6 (L) 36.0 - 46.0 %    MCV 94 80 - 100 fL    MCH 30.4 26.0 - 34.0 pg    MCHC 32.5 32.0 - 36.0 g/dL    RDW 15.1 (H) 11.5 - 14.5 %    Platelets 280 150 - 450 x10*3/uL   aPTT   Result Value Ref Range    aPTT 71.1 (H) 22.0 - 32.5 seconds   POCT GLUCOSE   Result Value Ref Range    POCT Glucose 389 (H) 74 - 99 mg/dL   APTT   Result Value Ref Range    aPTT 70.7 (H) 22.0 - 32.5 seconds   POCT GLUCOSE   Result Value Ref Range    POCT Glucose 380 (H) 74 - 99 mg/dL   US guided biopsy lymph node superficial    Result Date: 10/23/2024  Interpreted By:  Florencio Ruggiero, STUDY: US  GUIDED BIOPSY LYMPH NODE SUPERFICIAL; 10/23/2024 1:51 pm   INDICATION: Left infraclavicular adenopathy.   COMPARISON: None   ACCESSION NUMBER(S): [id number]   ORDERING CLINICIAN: NOLAN SORTO   TECHNIQUE: Ultrasound-guided biopsy of cervical lymph node   FINDINGS: Informed consent obtained. Patient positioned supine. Left infraclavicular region prepped and draped. Under ultrasound guidance, a 16 gauge biopsy instrument advanced to enlarged morphologically abnormal lymph node and 3 core biopsies obtained. Specimens submitted for histology and flow cytometry. Patient tolerated procedure well..       Ultrasound-guided core biopsy of enlarged left infraclavicular lymph node.     Signed by: Florencio Ruggiero 10/23/2024 3:31 PM Dictation workstation:   [id number]    MR brain w and wo IV contrast    Result Date: 10/23/2024  Interpreted By:  Pietro Kaur, STUDY: MR BRAIN W AND WO IV CONTRAST; ;  10/22/2024 7:11 pm   INDICATION: Signs/Symptoms:rule out brain mets.     COMPARISON: None.   ACCESSION NUMBER(S): [id number]   ORDERING CLINICIAN: NOLAN SORTO   TECHNIQUE: MRI of the brain was performed with the acquisition of axial diffusion-weighted, axial T1, axial FLAIR, axial T2 gradient echo, axial T2 fat saturated, axial T1 fat saturated postcontrast sequence, and volumetric axial T1 post contrast sequence with multiplanar reformats.   Contrast: 16 mL of Dotarem was injected intravenously.   FINDINGS: There is no acute intracranial hemorrhage or infarct. There are 3 small lesions located within the left centrum semiovale and within the left occipital white matter which demonstrate increased signal on the diffusion-weighted sequence but without signal dropout on the ADC map which is most consistent with T2 shine through.   There is a punctate focus of enhancement located within the right parietal lobe gray-white matter junction (series 900, image 123 of 190) which is highly suspicious for a metastasis. There are 2  rim enhancing lesions with internal regions of non enhancement located within the right cerebellum seen on series 900 images 55 and 59 which measure 5 and 8 mm respectively. These lesions are most consistent with metastases. There is mild parenchymal vasogenic edema within the surrounding brain parenchyma with no mass effect.   There is a 6 mm nodular focus of enhancement along the right occipital lobe (series 900, image 90 of 190) which is highly suspicious for a dural-based metastasis, alternatively, could reflect a small meningioma. There is diffuse dural enhancement along the bilateral cerebral convexities which is suspicious for neoplastic infiltration.   There are multiple regions of T2 hyperintensity within the bilateral cerebral hemispheric white matter which are probably sequela of chronic small vessel ischemic changes.   Ventricles, sulci, and basilar cisterns are normal in size, shape, and configuration for patient's age.   There is mild mucosal thickening located within the left maxillary sinus and within the ethmoid air cells.       1. There are 3 enhancing lesions located within the right parietal lobe and within the right cerebellum which are most consistent with metastases. The largest of these lesions is located within the right cerebellum and measures 8 mm.   2. There is mild vasogenic edema located within the right cerebellum surrounding the metastases causing no striking mass effect.   3. Subcentimeter enhancing mass arising from the right occipital lobe dura is most consistent with a metastasis versus a meningioma.   4. There is diffuse dural enhancement which is suspicious for neoplastic infiltration, however other disease processes such as infectious or inflammatory infiltration of the meninges or reactive changes from intracranial hypotension can also cause similar appearance. There are no other imaging findings to suggest intracranial hypotension. Sequela of recent lumbar puncture can also  cause diffuse dural enhancement, correlate clinically.   This study was interpreted at [facility].   MACRO: None   Signed by: Pietro Kaur 10/23/2024 10:15 AM Dictation workstation:   [id number]    Assessment/Plan   Assessment & Plan  Nausea and vomiting, unspecified vomiting type  Improved  Persistent dizziness - may be related to cerebellar mets. Dexamethasone 6mg daily ordered.  Pneumonia of left upper lobe due to infectious organism  Post obstructive.   Continue unasyn.  Can transition to augmentin at discharge for a total 14 day course.   Tobacco use  Does not want replacement therapy  Mass of upper lobe of right lung  Biopsy of LN done today for diagnosis  Metastatic disease (Multi)  Pt does not think she wants treatment - hospice consulted  Hypokalemia  improving  Hyponatremia  improving  Hypomagnesemia  resolved  Lactic acidosis  Secondary to fluid depletion - resolved  Elevated troponin  resolved  Acute anemia  Likely secondary to malignancy  Prolonged Q-T interval on ECG  Avoid qt prolonging agents  Left atrial mass  Thrombus - on anticoagulation  Hypothyroidism  On relpletion  79 YOF with h/o DM, HTN, arthritis, who p/w N/V. And vomiting associated with significant weight loss. Symptoms also included cough productive of thin clear sputum. In the ED found to have hypokalemia, hypomagnesemia, admitted to [facility] for further management. Also had a chest CT done that showed large lung mass invading toe mediastinum with extensive mediastinal LAP. Pulmonary is consulted for the CT findings.     Pneumonia: likely post obstructive 2/2 the mass.       Continue Unasyn      Continue to monitor for now    RUL mass: with concern for cancer. Given also lymphadenopathy, concern for cancer. S/p US guided lymph node biopsy.        FU on biopsy result       Might need repeat biopsy    Tobacco use disorder:        Smoking cessation    Pulmonary will FU while in house.     Vika Bonner,  MD

## 2024-10-24 NOTE — PROGRESS NOTES
MSW called pt dtr [place] [phone] 3x. Each time I called it rang twice and sounded like it was answered but there was no one on the other end.     Dc plan TBD due to pt recent dx. PT rec SNF. [facility] is following and mentioned dtr might be interested in hospice and there is an order but no preference of Hospice organization listed.     MSW will continue to reach out to pt dtr so we can secure a safe dc plan SNF vs Hospice. Pt was sleeping when MSW went to see her in her room earlier.

## 2024-10-24 NOTE — PROGRESS NOTES
"Dian Perez is a 79 y.o. female on day 4 of admission presenting with Nausea and vomiting, unspecified vomiting type.    Subjective   This patient was seen and examined.  Sitting up in the bed.  The patient states her appetite is much better today.  No acute events overnight.    Objective     Physical Exam  Vitals and nursing note reviewed.   Constitutional:       Appearance: She is ill-appearing.   HENT:      Head: Normocephalic and atraumatic.      Mouth/Throat:      Mouth: Mucous membranes are moist.   Eyes:      Pupils: Pupils are equal, round, and reactive to light.   Cardiovascular:      Rate and Rhythm: Normal rate and regular rhythm.      Heart sounds: No murmur heard.     No friction rub. No gallop.   Pulmonary:      Breath sounds: Rhonchi present.      Comments: Moist cough  Abdominal:      General: Bowel sounds are normal.   Musculoskeletal:      Cervical back: Neck supple. No rigidity.      Right lower leg: No edema.      Left lower leg: No edema.   Skin:     General: Skin is dry.      Capillary Refill: Capillary refill takes 2 to 3 seconds.      Coloration: Skin is pale.   Neurological:      Mental Status: She is oriented to person, place, and time.      Motor: Weakness present.       Last Recorded Vitals  Blood pressure (!) 109/36, pulse 69, temperature 36.6 °C (97.9 °F), temperature source Oral, resp. rate 16, height 1.702 m (5' 7\"), weight 75.8 kg (167 lb 1.7 oz), SpO2 94%.  Intake/Output last 3 Shifts:  I/O last 3 completed shifts:  In: 1214.3 (16 mL/kg) [P.O.:400; I.V.:414.3 (5.5 mL/kg); IV Piggyback:400]  Out: 1200 (15.8 mL/kg) [Urine:1200 (0.4 mL/kg/hr)]  Weight: 75.8 kg     Relevant Results  Results for orders placed or performed during the hospital encounter of 10/20/24 (from the past 24 hours)   CBC   Result Value Ref Range    WBC 8.7 4.4 - 11.3 x10*3/uL    nRBC 0.0 0.0 - 0.0 /100 WBCs    RBC 3.21 (L) 4.00 - 5.20 x10*6/uL    Hemoglobin 9.7 (L) 12.0 - 16.0 g/dL    Hematocrit 30.1 (L) 36.0 - 46.0 " %    MCV 94 80 - 100 fL    MCH 30.2 26.0 - 34.0 pg    MCHC 32.2 32.0 - 36.0 g/dL    RDW 15.1 (H) 11.5 - 14.5 %    Platelets 303 150 - 450 x10*3/uL   POCT GLUCOSE   Result Value Ref Range    POCT Glucose 188 (H) 74 - 99 mg/dL   POCT GLUCOSE   Result Value Ref Range    POCT Glucose 191 (H) 74 - 99 mg/dL   Comprehensive Metabolic Panel   Result Value Ref Range    Glucose 258 (H) 74 - 99 mg/dL    Sodium 130 (L) 136 - 145 mmol/L    Potassium 4.0 3.5 - 5.3 mmol/L    Chloride 95 (L) 98 - 107 mmol/L    Bicarbonate 28 21 - 32 mmol/L    Anion Gap 11 10 - 20 mmol/L    Urea Nitrogen 5 (L) 6 - 23 mg/dL    Creatinine 0.52 0.50 - 1.05 mg/dL    eGFR >90 >60 mL/min/1.73m*2    Calcium 8.5 (L) 8.6 - 10.3 mg/dL    Albumin 2.5 (L) 3.4 - 5.0 g/dL    Alkaline Phosphatase 41 33 - 136 U/L    Total Protein 6.3 (L) 6.4 - 8.2 g/dL    AST 13 9 - 39 U/L    Bilirubin, Total 0.4 0.0 - 1.2 mg/dL    ALT 5 (L) 7 - 45 U/L   Protime-INR   Result Value Ref Range    Protime 18.2 (H) 9.3 - 12.7 seconds    INR 1.8 (H) 0.9 - 1.2   CBC   Result Value Ref Range    WBC 8.4 4.4 - 11.3 x10*3/uL    nRBC 0.0 0.0 - 0.0 /100 WBCs    RBC 3.06 (L) 4.00 - 5.20 x10*6/uL    Hemoglobin 9.3 (L) 12.0 - 16.0 g/dL    Hematocrit 28.6 (L) 36.0 - 46.0 %    MCV 94 80 - 100 fL    MCH 30.4 26.0 - 34.0 pg    MCHC 32.5 32.0 - 36.0 g/dL    RDW 15.1 (H) 11.5 - 14.5 %    Platelets 280 150 - 450 x10*3/uL   aPTT   Result Value Ref Range    aPTT 71.1 (H) 22.0 - 32.5 seconds   POCT GLUCOSE   Result Value Ref Range    POCT Glucose 389 (H) 74 - 99 mg/dL   APTT   Result Value Ref Range    aPTT 70.7 (H) 22.0 - 32.5 seconds   POCT GLUCOSE   Result Value Ref Range    POCT Glucose 380 (H) 74 - 99 mg/dL      US guided biopsy lymph node superficial  Result Date: 10/23/2024  Ultrasound-guided core biopsy of enlarged left infraclavicular lymph node.     Signed by: Florencio Ruggiero 10/23/2024 3:31 PM Dictation workstation:   [id number]    MR brain w and wo IV contrast  Result Date: 10/23/2024  1. There  are 3 enhancing lesions located within the right parietal lobe and within the right cerebellum which are most consistent with metastases. The largest of these lesions is located within the right cerebellum and measures 8 mm.   2. There is mild vasogenic edema located within the right cerebellum surrounding the metastases causing no striking mass effect.   3. Subcentimeter enhancing mass arising from the right occipital lobe dura is most consistent with a metastasis versus a meningioma.   4. There is diffuse dural enhancement which is suspicious for neoplastic infiltration, however other disease processes such as infectious or inflammatory infiltration of the meninges or reactive changes from intracranial hypotension can also cause similar appearance. There are no other imaging findings to suggest intracranial hypotension. Sequela of recent lumbar puncture can also cause diffuse dural enhancement, correlate clinically.   This study was interpreted at [facility].   MACRO: None   Signed by: Pietro Kaur 10/23/2024 10:15 AM Dictation workstation:   [id number]    Scheduled medications  ampicillin-sulbactam, 3 g, intravenous, q6h  budesonide, 0.5 mg, nebulization, BID  dexAMETHasone, 6 mg, oral, Daily with breakfast  fluticasone, 2 spray, Each Nostril, Daily  folic acid, 1 mg, oral, Daily  formoterol, 20 mcg, nebulization, BID  insulin lispro, 0-15 Units, subcutaneous, TID  insulin NPH (Isophane), 6 Units, subcutaneous, q12h ADRIAN  levothyroxine, 150 mcg, oral, Daily  metoprolol succinate XL, 50 mg, oral, BID  pantoprazole, 40 mg, oral, BID AC  polyethylene glycol, 17 g, oral, Daily  potassium chloride CR, 20 mEq, oral, Daily  warfarin, 5 mg, oral, Daily      Continuous medications  heparin, 0-4,500 Units/hr, Last Rate: 700 Units/hr (10/24/24 6209)      PRN medications  PRN medications: acetaminophen **OR** acetaminophen **OR** acetaminophen, albuterol, benzonatate, dextrose, dextrose,  glucagon, glucagon, heparin (porcine), LORazepam, meclizine, melatonin, oxymetazoline, traMADol     Assessment/Plan   Assessment & Plan  Mass of upper lobe of right lung    UTI  Nausea/Vomiting  LA Thrombus  Mass RUL with L adrenal nodule, mediastinal LN, L subpectoral mass, newly found brain mets on MRI brain.   Prolonged QTC interval  Hypokalemia/Hyponatremia  Anemia  Debility  OA Pain  Palliative Care     DNRCCA/DNI/No ICU  The patient has decision-making capacity  Has 3 children, Kenia, Kamille and estranged son  LW and DPOA-HC in physical chart   DPOA-HC is Kenia and 1st alternate is Kamille     10/24  Discussion with patient regarding advanced directives.  The patient and I filled out a power of  for healthcare and living well at bedside.  This was placed in a physical chart.  Daughter Calista called for an update, all questions answered.  This patient stated that she did not want any extensive measures and that in the event that patient was unable to speak for herself, her daughter Kenia would be the one who would follow her wishes over her daughter Kamille.  Goals of care established.  Palliative care will sign off at this time.    10/23  Attempted to assist patient with signing HPOA/LW today, signing halted by daughter Kamille. Patient and daughter Kamille to discuss further as patient had initially wanted her daughter Kenia as Primary POA. I have asked SRINIVAS Aguilar to follow up.   Discussed updates with daughter Kamille after patient gave permission.   Spoke to attending service, new brain mets with vasogenic edema, prognosis poor. Would like to get hospice informational meeting set up as patient does not want any treatment for her cancer. Will need to follow up with patient after biopsy today at 1400.      Plan still remains to medically stabilize, then discharge to SNF to optimize function, but will then transition to LTC, likely with hospice for supportive care.       Family working on DPOA as they will  need to assist with spenddown and medicaid.      Addendum: Came back to bedside after biopsy done. Pain uncontrolled with tramadol 25mg, increased dose to 50mg prn. Daughter Kamille at bedside. Patient/daughter requested to review scan results. I conveyed that MRI did show brain mets. Patient very quickly and clearly after hearing results stated that she wanted no treatment of her cancer. I did inquire about hospice informational meeting during our talk and both were in agreement. Patient requested I reach out to daughter Kenia to provide update, sent message requesting call back.      10/22  Encouraged use of prn tramadol low dose and tylenol to assist with pain control, encouraged hydration. Awaiting bone scan, mri brain and lung biopsy.   Planning for SNF when medically stable, then consideration for hospice if she does not want any treatment.   Needs HPOA and LW completed. LSW consulted.      10/21  Patient very clear that she does not want any treatment for her likely malignancy, however she is agreeable to lung biopsy to confirm diagnosis and she is ok with bone scan, MRI brain to assist with prognostication. She values quality of life and confesses that her quality of life currently is very poor. She is not afraid to die. She is bedbound at baseline and agrees with daughter that she cannot safely return home.   Discussed concerns and wishes with attending and daughter. Plan to medically stablize, pursue lung biopsy while inpatient. Then plan for rehab to optimize physical function. Likely will require placement after rehab, with hospice for supportive care. Care coordination involved, will need family choices for SNF. We initiated conversations about hospice referral, will follow up tomorrow for patient/family decision. Daughter planning to contact [facility] to get simple will and DPOA. I provided copy of HPOA and LW for daughter/patient to review, will follow up tomorrow to sign/witness.       Treatment model of care, plan for SNF when medically stable, will then likely transition to LTC with hospice.     I spent 60 minutes in the professional and overall care of this patient.  Total ACP time was 20 minutes.      Beatris Beard, APRN-CNP

## 2024-10-24 NOTE — PROGRESS NOTES
Dian Perez is a 79 y.o. female on day 4 of admission presenting with Nausea and vomiting, unspecified vomiting type.      Subjective   States she's feeling a lot better today. Feels more awake and appetite is improving. Tolerating PO. Has loose stools chronically.       Objective     Last Recorded Vitals  BP (!) 109/36 (BP Location: Right arm, Patient Position: Lying)   Pulse 69   Temp 36.6 °C (97.9 °F) (Oral)   Resp 16   Wt 75.8 kg (167 lb 1.7 oz)   SpO2 94%   Intake/Output last 3 Shifts:    Intake/Output Summary (Last 24 hours) at 10/24/2024 1226  Last data filed at 10/24/2024 0908  Gross per 24 hour   Intake 516.25 ml   Output 450 ml   Net 66.25 ml       Admission Weight  Weight: 77.1 kg (170 lb) (10/20/24 1520)    Daily Weight  10/22/24 : 75.8 kg (167 lb 1.7 oz)    Image Results  US guided biopsy lymph node superficial  Narrative: Interpreted By:  Florencio Ruggiero,   STUDY:  US GUIDED BIOPSY LYMPH NODE SUPERFICIAL; 10/23/2024 1:51 pm      INDICATION:  Left infraclavicular adenopathy.      COMPARISON:  None      ACCESSION NUMBER(S):  [id number]      ORDERING CLINICIAN:  NOLAN SORTO      TECHNIQUE:  Ultrasound-guided biopsy of cervical lymph node      FINDINGS:  Informed consent obtained. Patient positioned supine. Left  infraclavicular region prepped and draped. Under ultrasound guidance,  a 16 gauge biopsy instrument advanced to enlarged morphologically  abnormal lymph node and 3 core biopsies obtained. Specimens submitted  for histology and flow cytometry. Patient tolerated procedure well..      Impression: Ultrasound-guided core biopsy of enlarged left infraclavicular lymph  node.          Signed by: Florencio Ruggiero 10/23/2024 3:31 PM  Dictation workstation:   [id number]  MR brain w and wo IV contrast  Narrative: Interpreted By:  Pietro Kaur,   STUDY:  MR BRAIN W AND WO IV CONTRAST; ;  10/22/2024 7:11 pm      INDICATION:  Signs/Symptoms:rule out brain mets.          COMPARISON:  None.      ACCESSION  NUMBER(S):  [id number]      ORDERING CLINICIAN:  NOLAN SORTO      TECHNIQUE:  MRI of the brain was performed with the acquisition of axial  diffusion-weighted, axial T1, axial FLAIR, axial T2 gradient echo,  axial T2 fat saturated, axial T1 fat saturated postcontrast sequence,  and volumetric axial T1 post contrast sequence with multiplanar  reformats.      Contrast: 16 mL of Dotarem was injected intravenously.      FINDINGS:  There is no acute intracranial hemorrhage or infarct. There are 3  small lesions located within the left centrum semiovale and within  the left occipital white matter which demonstrate increased signal on  the diffusion-weighted sequence but without signal dropout on the ADC  map which is most consistent with T2 shine through.      There is a punctate focus of enhancement located within the right  parietal lobe gray-white matter junction (series 900, image 123 of  190) which is highly suspicious for a metastasis. There are 2 rim  enhancing lesions with internal regions of non enhancement located  within the right cerebellum seen on series 900 images 55 and 59 which  measure 5 and 8 mm respectively. These lesions are most consistent  with metastases. There is mild parenchymal vasogenic edema within the  surrounding brain parenchyma with no mass effect.      There is a 6 mm nodular focus of enhancement along the right  occipital lobe (series 900, image 90 of 190) which is highly  suspicious for a dural-based metastasis, alternatively, could reflect  a small meningioma. There is diffuse dural enhancement along the  bilateral cerebral convexities which is suspicious for neoplastic  infiltration.      There are multiple regions of T2 hyperintensity within the bilateral  cerebral hemispheric white matter which are probably sequela of  chronic small vessel ischemic changes.      Ventricles, sulci, and basilar cisterns are normal in size, shape,  and configuration for patient's age.      There  is mild mucosal thickening located within the left maxillary  sinus and within the ethmoid air cells.      Impression: 1. There are 3 enhancing lesions located within the right parietal  lobe and within the right cerebellum which are most consistent with  metastases. The largest of these lesions is located within the right  cerebellum and measures 8 mm.      2. There is mild vasogenic edema located within the right cerebellum  surrounding the metastases causing no striking mass effect.      3. Subcentimeter enhancing mass arising from the right occipital lobe  dura is most consistent with a metastasis versus a meningioma.      4. There is diffuse dural enhancement which is suspicious for  neoplastic infiltration, however other disease processes such as  infectious or inflammatory infiltration of the meninges or reactive  changes from intracranial hypotension can also cause similar  appearance. There are no other imaging findings to suggest  intracranial hypotension. Sequela of recent lumbar puncture can also  cause diffuse dural enhancement, correlate clinically.      This study was interpreted at [facility].      MACRO:  None      Signed by: Pietro Kaur 10/23/2024 10:15 AM  Dictation workstation:   [id number]      Physical Exam  Constitutional:       General: She is not in acute distress.     Appearance: She is ill-appearing. She is not toxic-appearing.   HENT:      Head: Normocephalic.      Mouth/Throat:      Pharynx: Oropharynx is clear.   Eyes:      General: No scleral icterus.  Cardiovascular:      Rate and Rhythm: Normal rate.   Pulmonary:      Effort: No respiratory distress.      Breath sounds: No wheezing.   Abdominal:      General: There is no distension.      Palpations: Abdomen is soft.   Musculoskeletal:      Right lower leg: No edema.      Left lower leg: No edema.   Neurological:      Mental Status: She is alert and oriented to person, place, and time.          Relevant Results               Assessment/Plan        Assessment & Plan  Mass of upper lobe of right lung  Oncology consult  Pulmonology consult  Palliative care consult  Smoking cessation  Bone scan without evidence of metastasis  MRI brain showing metastatic disease  Started on decadron   S/p lymph node biopsy  Continue unasyn for post-obstructive pneumonia, can be discharged on augmentin for 14 day course per pulm  Metastatic disease (Multi)  As above  Nausea and vomiting, unspecified vomiting type  Likely related to malignancy  Resolved   Left atrial mass  CT imaging showing thrombus in the left atrium and left ventricle  Continue heparin drip  Echo showing EF 55-60%, diastolic dysfunction, and moderately sized left atrial thrombus   Cardiology consulted  Started on coumadin by cardiology - will bridge with heparin and stop heparin gtt when INR is therapeutic  Prolonged Q-T interval on ECG  QTc 616 on EKG, avoid QT prolonging medication.  Type 2 diabetes mellitus  HbA1c 6.6%  Continue SSI  Hypoglycemia protocol  Hypokalemia  Replace PRN  Acute anemia  GI consulted  Stool occult negative  Likely related to malignancy  Continue PPI BID  Hypomagnesemia  Replace PRN  Hyponatremia  Hydrochlorothiazide discontinued  Resolved   Elevated troponin  Flat troponins 15->15, type II MI demand ischemia  Lactic acidosis  Suspect due to volume depletion  Resolved   Sinus tachycardia  Suspect due to volume depletion  Resolved   Adrenal nodule  Incidental finding  Follow up oncology and palliative care evaluations  Consider nephrology referral at discharge for further workup   Hypothyroidism  TSH normal  Continue Synthroid  Endometrial hyperplasia  Outpatient follow-up with gynecology, will need nonemergent ultrasound  Tobacco use  Greater than 120-pack-year smoking history, recommend smoking cessation.    Plan:  INR 1.8 this AM - continue heparin gtt and coumadin, check INR in the AM, if therapeutic will discontinue  heparin gtt  BG uncontrolled due to decadron, increase SSI and start NPH insulin, adjust as needed  Hospice consulted for informational meeting  PT/OT  Plan for discharge to SNF pending facility acceptance and precert             Ina Lane MD

## 2024-10-24 NOTE — NURSING NOTE
No change from previous assessment. Pt resting comfortably in bed with call light in reach. Heparin drip running with no signs of bleeding noted at this time.

## 2024-10-24 NOTE — NURSING NOTE
Pt sitting up in bed eating ice cream in pleasant mood. Medicated with prn pain medication. Updated daughter Kamille on pt status. Pt daughter mentions that pt has chronic long term pain and requesting pain medication to be given more routine d/t pt forgetting to ask for it at times. Noted to Kamille will pass on in report.

## 2024-10-25 LAB
ALBUMIN SERPL BCP-MCNC: 2.4 G/DL (ref 3.4–5)
ALP SERPL-CCNC: 36 U/L (ref 33–136)
ALT SERPL W P-5'-P-CCNC: 6 U/L (ref 7–45)
ANION GAP SERPL CALCULATED.3IONS-SCNC: 10 MMOL/L (ref 10–20)
APTT PPP: 49.3 SECONDS (ref 22–32.5)
AST SERPL W P-5'-P-CCNC: 11 U/L (ref 9–39)
BACTERIA BLD CULT: NORMAL
BACTERIA BLD CULT: NORMAL
BILIRUB SERPL-MCNC: 0.2 MG/DL (ref 0–1.2)
BUN SERPL-MCNC: 10 MG/DL (ref 6–23)
CALCIUM SERPL-MCNC: 8.5 MG/DL (ref 8.6–10.3)
CHLORIDE SERPL-SCNC: 98 MMOL/L (ref 98–107)
CO2 SERPL-SCNC: 29 MMOL/L (ref 21–32)
CREAT SERPL-MCNC: 0.52 MG/DL (ref 0.5–1.05)
EGFRCR SERPLBLD CKD-EPI 2021: >90 ML/MIN/1.73M*2
ERYTHROCYTE [DISTWIDTH] IN BLOOD BY AUTOMATED COUNT: 14.7 % (ref 11.5–14.5)
GLUCOSE BLD MANUAL STRIP-MCNC: 256 MG/DL (ref 74–99)
GLUCOSE BLD MANUAL STRIP-MCNC: 256 MG/DL (ref 74–99)
GLUCOSE BLD MANUAL STRIP-MCNC: 258 MG/DL (ref 74–99)
GLUCOSE BLD MANUAL STRIP-MCNC: 264 MG/DL (ref 74–99)
GLUCOSE BLD MANUAL STRIP-MCNC: 340 MG/DL (ref 74–99)
GLUCOSE SERPL-MCNC: 279 MG/DL (ref 74–99)
HCT VFR BLD AUTO: 26.8 % (ref 36–46)
HGB BLD-MCNC: 8.6 G/DL (ref 12–16)
INR PPP: 2.7 (ref 0.9–1.2)
MCH RBC QN AUTO: 30 PG (ref 26–34)
MCHC RBC AUTO-ENTMCNC: 32.1 G/DL (ref 32–36)
MCV RBC AUTO: 93 FL (ref 80–100)
NRBC BLD-RTO: 0 /100 WBCS (ref 0–0)
PLATELET # BLD AUTO: 295 X10*3/UL (ref 150–450)
POTASSIUM SERPL-SCNC: 3.7 MMOL/L (ref 3.5–5.3)
PROT SERPL-MCNC: 5.7 G/DL (ref 6.4–8.2)
PROTHROMBIN TIME: 26.7 SECONDS (ref 9.3–12.7)
RBC # BLD AUTO: 2.87 X10*6/UL (ref 4–5.2)
SODIUM SERPL-SCNC: 133 MMOL/L (ref 136–145)
WBC # BLD AUTO: 9.5 X10*3/UL (ref 4.4–11.3)

## 2024-10-25 PROCEDURE — 36415 COLL VENOUS BLD VENIPUNCTURE: CPT | Performed by: STUDENT IN AN ORGANIZED HEALTH CARE EDUCATION/TRAINING PROGRAM

## 2024-10-25 PROCEDURE — 99233 SBSQ HOSP IP/OBS HIGH 50: CPT | Performed by: INTERNAL MEDICINE

## 2024-10-25 PROCEDURE — 2500000004 HC RX 250 GENERAL PHARMACY W/ HCPCS (ALT 636 FOR OP/ED): Performed by: STUDENT IN AN ORGANIZED HEALTH CARE EDUCATION/TRAINING PROGRAM

## 2024-10-25 PROCEDURE — 99232 SBSQ HOSP IP/OBS MODERATE 35: CPT | Performed by: INTERNAL MEDICINE

## 2024-10-25 PROCEDURE — 99232 SBSQ HOSP IP/OBS MODERATE 35: CPT | Performed by: STUDENT IN AN ORGANIZED HEALTH CARE EDUCATION/TRAINING PROGRAM

## 2024-10-25 PROCEDURE — 85610 PROTHROMBIN TIME: CPT | Performed by: STUDENT IN AN ORGANIZED HEALTH CARE EDUCATION/TRAINING PROGRAM

## 2024-10-25 PROCEDURE — 94640 AIRWAY INHALATION TREATMENT: CPT

## 2024-10-25 PROCEDURE — 85027 COMPLETE CBC AUTOMATED: CPT | Performed by: STUDENT IN AN ORGANIZED HEALTH CARE EDUCATION/TRAINING PROGRAM

## 2024-10-25 PROCEDURE — 2500000002 HC RX 250 W HCPCS SELF ADMINISTERED DRUGS (ALT 637 FOR MEDICARE OP, ALT 636 FOR OP/ED): Performed by: STUDENT IN AN ORGANIZED HEALTH CARE EDUCATION/TRAINING PROGRAM

## 2024-10-25 PROCEDURE — 2500000001 HC RX 250 WO HCPCS SELF ADMINISTERED DRUGS (ALT 637 FOR MEDICARE OP): Performed by: NURSE PRACTITIONER

## 2024-10-25 PROCEDURE — 82947 ASSAY GLUCOSE BLOOD QUANT: CPT

## 2024-10-25 PROCEDURE — 2500000001 HC RX 250 WO HCPCS SELF ADMINISTERED DRUGS (ALT 637 FOR MEDICARE OP): Performed by: STUDENT IN AN ORGANIZED HEALTH CARE EDUCATION/TRAINING PROGRAM

## 2024-10-25 PROCEDURE — 80053 COMPREHEN METABOLIC PANEL: CPT | Performed by: STUDENT IN AN ORGANIZED HEALTH CARE EDUCATION/TRAINING PROGRAM

## 2024-10-25 PROCEDURE — 2500000001 HC RX 250 WO HCPCS SELF ADMINISTERED DRUGS (ALT 637 FOR MEDICARE OP): Performed by: REGISTERED NURSE

## 2024-10-25 PROCEDURE — 9420000001 HC RT PATIENT EDUCATION 5 MIN

## 2024-10-25 PROCEDURE — 94668 MNPJ CHEST WALL SBSQ: CPT

## 2024-10-25 PROCEDURE — 2500000005 HC RX 250 GENERAL PHARMACY W/O HCPCS: Performed by: STUDENT IN AN ORGANIZED HEALTH CARE EDUCATION/TRAINING PROGRAM

## 2024-10-25 PROCEDURE — 85730 THROMBOPLASTIN TIME PARTIAL: CPT | Performed by: STUDENT IN AN ORGANIZED HEALTH CARE EDUCATION/TRAINING PROGRAM

## 2024-10-25 PROCEDURE — 1100000001 HC PRIVATE ROOM DAILY

## 2024-10-25 PROCEDURE — 99221 1ST HOSP IP/OBS SF/LOW 40: CPT | Performed by: INTERNAL MEDICINE

## 2024-10-25 RX ORDER — GUAIFENESIN/DEXTROMETHORPHAN 100-10MG/5
5 SYRUP ORAL EVERY 4 HOURS PRN
Status: DISCONTINUED | OUTPATIENT
Start: 2024-10-25 | End: 2024-10-30 | Stop reason: HOSPADM

## 2024-10-25 RX ORDER — WARFARIN 2 MG/1
2 TABLET ORAL DAILY
Status: DISCONTINUED | OUTPATIENT
Start: 2024-10-25 | End: 2024-10-30 | Stop reason: HOSPADM

## 2024-10-25 ASSESSMENT — COGNITIVE AND FUNCTIONAL STATUS - GENERAL
HELP NEEDED FOR BATHING: A LITTLE
STANDING UP FROM CHAIR USING ARMS: A LITTLE
PERSONAL GROOMING: A LITTLE
DRESSING REGULAR UPPER BODY CLOTHING: A LOT
TURNING FROM BACK TO SIDE WHILE IN FLAT BAD: A LITTLE
MOBILITY SCORE: 16
DRESSING REGULAR UPPER BODY CLOTHING: A LITTLE
DRESSING REGULAR LOWER BODY CLOTHING: A LITTLE
DRESSING REGULAR LOWER BODY CLOTHING: A LITTLE
TOILETING: A LITTLE
CLIMB 3 TO 5 STEPS WITH RAILING: TOTAL
CLIMB 3 TO 5 STEPS WITH RAILING: TOTAL
HELP NEEDED FOR BATHING: A LITTLE
MOBILITY SCORE: 14
MOVING FROM LYING ON BACK TO SITTING ON SIDE OF FLAT BED WITH BEDRAILS: A LITTLE
WALKING IN HOSPITAL ROOM: A LOT
MOVING TO AND FROM BED TO CHAIR: A LITTLE
MOVING TO AND FROM BED TO CHAIR: A LITTLE
TOILETING: A LOT
DAILY ACTIVITIY SCORE: 17
TURNING FROM BACK TO SIDE WHILE IN FLAT BAD: A LITTLE
STANDING UP FROM CHAIR USING ARMS: A LOT
WALKING IN HOSPITAL ROOM: A LOT
PERSONAL GROOMING: A LITTLE

## 2024-10-25 ASSESSMENT — PAIN - FUNCTIONAL ASSESSMENT
PAIN_FUNCTIONAL_ASSESSMENT: 0-10

## 2024-10-25 ASSESSMENT — PAIN SCALES - GENERAL
PAINLEVEL_OUTOF10: 2
PAINLEVEL_OUTOF10: 0 - NO PAIN
PAINLEVEL_OUTOF10: 2
PAINLEVEL_OUTOF10: 6
PAINLEVEL_OUTOF10: 7
PAINLEVEL_OUTOF10: 4

## 2024-10-25 ASSESSMENT — PAIN DESCRIPTION - LOCATION: LOCATION: GENERALIZED

## 2024-10-25 ASSESSMENT — PAIN DESCRIPTION - DESCRIPTORS: DESCRIPTORS: ACHING

## 2024-10-25 NOTE — ASSESSMENT & PLAN NOTE
Oncology consult  Pulmonology consult  Palliative care consult  Smoking cessation  Bone scan without evidence of metastasis  MRI brain showing metastatic disease  Started on decadron   S/p lymph node biopsy

## 2024-10-25 NOTE — PROGRESS NOTES
Subjective Data:  Patient appears to be comfortable.  Currently taking p.o. medication but no active chest pain tightness.  INR is stable with no overt discontinue heparin.  Pending rehab placement.  No further cardiac workup.  Overnight Events:    None  Objective   Last Recorded Vitals  BP (!) 113/47 (BP Location: Right arm, Patient Position: Lying) Comment: RN NOTIFIED  Pulse 75   Temp 36.8 °C (98.2 °F) (Oral)   Resp 17   Wt 75.8 kg (167 lb 1.7 oz)   SpO2 93%     Intake/Output Summary (Last 24 hours) at 10/25/2024 1137  Last data filed at 10/25/2024 0920  Gross per 24 hour   Intake 1012.93 ml   Output --   Net 1012.93 ml     Physical Exam:  HEENT: Normocephalic/atraumatic pupils equal react light  Neck exam mild JVD, no bruit  Lung exam few wheezing bilaterally, few crackles at the bases  Cardiac exam is regular rhythm S1-S2, soft systolic murmur heard.   Abdomen soft nontender, nondistended  Extremities no clubbing, cyanosis, trace edema  Neuro exam grossly intact.  Image Results  US guided biopsy lymph node superficial  Narrative: Interpreted By:  Florencio Ruggiero,   STUDY:  US GUIDED BIOPSY LYMPH NODE SUPERFICIAL; 10/23/2024 1:51 pm      INDICATION:  Left infraclavicular adenopathy.      COMPARISON:  None      ACCESSION NUMBER(S):  [id number]      ORDERING CLINICIAN:  NOLAN SORTO      TECHNIQUE:  Ultrasound-guided biopsy of cervical lymph node      FINDINGS:  Informed consent obtained. Patient positioned supine. Left  infraclavicular region prepped and draped. Under ultrasound guidance,  a 16 gauge biopsy instrument advanced to enlarged morphologically  abnormal lymph node and 3 core biopsies obtained. Specimens submitted  for histology and flow cytometry. Patient tolerated procedure well..      Impression: Ultrasound-guided core biopsy of enlarged left infraclavicular lymph  node.          Signed by: Florencio Ruggiero 10/23/2024 3:31 PM  Dictation workstation:   [id number]  MR brain w and wo IV  contrast  Narrative: Interpreted By:  Pietro Kaur,   STUDY:  MR BRAIN W AND WO IV CONTRAST; ;  10/22/2024 7:11 pm      INDICATION:  Signs/Symptoms:rule out brain mets.          COMPARISON:  None.      ACCESSION NUMBER(S):  [id number]      ORDERING CLINICIAN:  NOLAN SORTO      TECHNIQUE:  MRI of the brain was performed with the acquisition of axial  diffusion-weighted, axial T1, axial FLAIR, axial T2 gradient echo,  axial T2 fat saturated, axial T1 fat saturated postcontrast sequence,  and volumetric axial T1 post contrast sequence with multiplanar  reformats.      Contrast: 16 mL of Dotarem was injected intravenously.      FINDINGS:  There is no acute intracranial hemorrhage or infarct. There are 3  small lesions located within the left centrum semiovale and within  the left occipital white matter which demonstrate increased signal on  the diffusion-weighted sequence but without signal dropout on the ADC  map which is most consistent with T2 shine through.      There is a punctate focus of enhancement located within the right  parietal lobe gray-white matter junction (series 900, image 123 of  190) which is highly suspicious for a metastasis. There are 2 rim  enhancing lesions with internal regions of non enhancement located  within the right cerebellum seen on series 900 images 55 and 59 which  measure 5 and 8 mm respectively. These lesions are most consistent  with metastases. There is mild parenchymal vasogenic edema within the  surrounding brain parenchyma with no mass effect.      There is a 6 mm nodular focus of enhancement along the right  occipital lobe (series 900, image 90 of 190) which is highly  suspicious for a dural-based metastasis, alternatively, could reflect  a small meningioma. There is diffuse dural enhancement along the  bilateral cerebral convexities which is suspicious for neoplastic  infiltration.      There are multiple regions of T2 hyperintensity within the bilateral  cerebral  hemispheric white matter which are probably sequela of  chronic small vessel ischemic changes.      Ventricles, sulci, and basilar cisterns are normal in size, shape,  and configuration for patient's age.      There is mild mucosal thickening located within the left maxillary  sinus and within the ethmoid air cells.      Impression: 1. There are 3 enhancing lesions located within the right parietal  lobe and within the right cerebellum which are most consistent with  metastases. The largest of these lesions is located within the right  cerebellum and measures 8 mm.      2. There is mild vasogenic edema located within the right cerebellum  surrounding the metastases causing no striking mass effect.      3. Subcentimeter enhancing mass arising from the right occipital lobe  dura is most consistent with a metastasis versus a meningioma.      4. There is diffuse dural enhancement which is suspicious for  neoplastic infiltration, however other disease processes such as  infectious or inflammatory infiltration of the meninges or reactive  changes from intracranial hypotension can also cause similar  appearance. There are no other imaging findings to suggest  intracranial hypotension. Sequela of recent lumbar puncture can also  cause diffuse dural enhancement, correlate clinically.      This study was interpreted at [facility].      MACRO:  None      Signed by: Pietro Kaur 10/23/2024 10:15 AM  Dictation workstation:   [id number]    Last Labs:  CBC - 10/25/2024:  5:26 AM  9.5 8.6 295    26.8      CMP - 10/25/2024:  5:26 AM  8.5 5.7 11 --- 0.2   _ 2.4 6 36      PTT - 10/25/2024:  5:26 AM  2.7   26.7 49.3     Inpatient Medications:  Scheduled medications   Medication Dose Route Frequency    ampicillin-sulbactam  3 g intravenous q6h    budesonide  0.5 mg nebulization BID    dexAMETHasone  6 mg oral Daily with breakfast    fluticasone  2 spray Each Nostril Daily    folic acid  1 mg oral  Daily    formoterol  20 mcg nebulization BID    insulin lispro  0-15 Units subcutaneous TID    insulin NPH (Isophane)  6 Units subcutaneous q12h ADRIAN    levothyroxine  150 mcg oral Daily    metoprolol succinate XL  50 mg oral BID    pantoprazole  40 mg oral BID AC    polyethylene glycol  17 g oral Daily    potassium chloride CR  20 mEq oral Daily    warfarin  2 mg oral Daily     Principal Problem:    Nausea and vomiting, unspecified vomiting type  Active Problems:    Tobacco use    Mass of upper lobe of right lung    Hilar mass    Metastatic disease (Multi)    Endometrial hyperplasia    Hypokalemia    Hyponatremia    Hypomagnesemia    Lactic acidosis    Elevated troponin    Type 2 diabetes mellitus    Acute anemia    Prolonged Q-T interval on ECG    Sinus tachycardia    Adrenal nodule    Left atrial mass    Hypothyroidism    Pneumonia of left upper lobe due to infectious organism    Assessment/Plan   Patient has abdominal history of mass in the upper right lung with a left atrial mass possible thrombus versus secondary metastasis.  Normal ejection fraction.  1.  Left atrial mass: Currently started patient anticoagulation INR is between 2 and 3.  Discontinue Afrin.  2.  Diabetes: Continue current insulin.    Critical care time is spent at bedside includes review of diagnostic tests, labs, and radiographs, serial assessments and management of hemodynamics, EKGs, old echoes, cardiac work-up and coordination of care.  Assessment, impression and plans are reflected in the note above as well as the orders.    Code Status:  DNR and No Intubation and No ICU  I spent 40 minutes in the professional and overall care of this patient.  Konrad Baker MD

## 2024-10-25 NOTE — CARE PLAN
Spoke with pt and her dtr Kenia  Pt does not want to go to rehab. She would like Hospice care.  [facility]dtr [facility]  Will place referral.  Pt would like DNR COMFORT CARE  Pt would like info on private duty aids; list given  Pts living Will and POA are both in the pts chart.  16:59 Referral placed to [facility]    DISCHARGE PLAN; HOME WITH FAMILY AND HOSPICE CARE; REFERRAL PLACED IN COMPUTER

## 2024-10-25 NOTE — ASSESSMENT & PLAN NOTE
Continue unasyn for post-obstructive pneumonia, can be discharged on augmentin for 14 day course per pulm

## 2024-10-25 NOTE — ASSESSMENT & PLAN NOTE
CT imaging showing thrombus in the left atrium and left ventricle  Continue heparin drip  Echo showing EF 55-60%, diastolic dysfunction, and moderately sized left atrial thrombus   Cardiology consulted  Started on coumadin by cardiology  Goal INR 2-3

## 2024-10-25 NOTE — CARE PLAN
Problem: Respiratory  Goal: Clear secretions with interventions this shift  Outcome: Progressing  Goal: Minimal/no exertional discomfort or dyspnea this shift  Outcome: Progressing  Goal: No signs of respiratory distress (eg. Use of accessory muscles. Peds grunting)  Outcome: Progressing  Goal: Tolerate pulmonary toileting this shift  Outcome: Progressing  Goal: Verbalize decreased shortness of breath this shift  Outcome: Progressing  Goal: Increase self care and/or family involvement in next 24 hours  Outcome: Progressing

## 2024-10-25 NOTE — CARE PLAN
The patient's goals for the shift include      The clinical goals for the shift include rest      Problem: Nutrition  Goal: Less than 5 days NPO/clear liquids  Outcome: Progressing  Goal: Oral intake greater than 50%  Outcome: Progressing  Goal: Oral intake greater 75%  Outcome: Progressing  Goal: Consume prescribed supplement  Outcome: Progressing  Goal: Adequate PO fluid intake  Outcome: Progressing  Goal: Nutrition support goals are met within 48 hrs  Outcome: Progressing  Goal: Nutrition support is meeting 75% of nutrient needs  Outcome: Progressing  Goal: Tube feed tolerance  Outcome: Progressing  Goal: BG  mg/dL  Outcome: Progressing  Goal: Lab values WNL  Outcome: Progressing  Goal: Electrolytes WNL  Outcome: Progressing  Goal: Promote healing  Outcome: Progressing  Goal: Maintain stable weight  Outcome: Progressing  Goal: Reduce weight from edema/fluid  Outcome: Progressing  Goal: Gradual weight gain  Outcome: Progressing  Goal: Improve ostomy output  Outcome: Progressing     Problem: Pain  Goal: Takes deep breaths with improved pain control throughout the shift  Outcome: Progressing  Goal: Turns in bed with improved pain control throughout the shift  Outcome: Progressing  Goal: Walks with improved pain control throughout the shift  Outcome: Progressing  Goal: Performs ADL's with improved pain control throughout shift  Outcome: Progressing  Goal: Participates in PT with improved pain control throughout the shift  Outcome: Progressing  Goal: Free from opioid side effects throughout the shift  Outcome: Progressing  Goal: Free from acute confusion related to pain meds throughout the shift  Outcome: Progressing     Problem: Fall/Injury  Goal: Not fall by end of shift  Outcome: Progressing  Goal: Be free from injury by end of the shift  Outcome: Progressing  Goal: Verbalize understanding of personal risk factors for fall in the hospital  Outcome: Progressing  Goal: Verbalize understanding of risk factor  reduction measures to prevent injury from fall in the home  Outcome: Progressing  Goal: Use assistive devices by end of the shift  Outcome: Progressing  Goal: Pace activities to prevent fatigue by end of the shift  Outcome: Progressing     Problem: Skin  Goal: Decreased wound size/increased tissue granulation at next dressing change  Outcome: Progressing  Flowsheets (Taken 10/25/2024 0917)  Decreased wound size/increased tissue granulation at next dressing change: Promote sleep for wound healing  Goal: Participates in plan/prevention/treatment measures  Outcome: Progressing  Flowsheets (Taken 10/25/2024 0917)  Participates in plan/prevention/treatment measures: Discuss with provider PT/OT consult  Goal: Prevent/manage excess moisture  Outcome: Progressing  Flowsheets (Taken 10/25/2024 0917)  Prevent/manage excess moisture: Monitor for/manage infection if present  Goal: Prevent/minimize sheer/friction injuries  Outcome: Progressing  Flowsheets (Taken 10/25/2024 0917)  Prevent/minimize sheer/friction injuries: Use pull sheet  Goal: Promote/optimize nutrition  Outcome: Progressing  Flowsheets (Taken 10/25/2024 0917)  Promote/optimize nutrition: Monitor/record intake including meals  Goal: Promote skin healing  Outcome: Progressing  Flowsheets (Taken 10/25/2024 0917)  Promote skin healing: Turn/reposition every 2 hours/use positioning/transfer devices     Problem: Pain - Adult  Goal: Verbalizes/displays adequate comfort level or baseline comfort level  Outcome: Progressing     Problem: Safety - Adult  Goal: Free from fall injury  Outcome: Progressing     Problem: Discharge Planning  Goal: Discharge to home or other facility with appropriate resources  Outcome: Progressing     Problem: Chronic Conditions and Co-morbidities  Goal: Patient's chronic conditions and co-morbidity symptoms are monitored and maintained or improved  Outcome: Progressing     Problem: Diabetes  Goal: Achieve decreasing blood glucose levels by end  of shift  Outcome: Progressing  Goal: Increase stability of blood glucose readings by end of shift  Outcome: Progressing  Goal: Decrease in ketones present in urine by end of shift  Outcome: Progressing  Goal: Maintain electrolyte levels within acceptable range throughout shift  Outcome: Progressing  Goal: Maintain glucose levels >70mg/dl to <250mg/dl throughout shift  Outcome: Progressing  Goal: No changes in neurological exam by end of shift  Outcome: Progressing  Goal: Learn about and adhere to nutrition recommendations by end of shift  Outcome: Progressing  Goal: Vital signs within normal range for age by end of shift  Outcome: Progressing  Goal: Increase self care and/or family involovement by end of shift  Outcome: Progressing  Goal: Receive DSME education by end of shift  Outcome: Progressing     Problem: Respiratory  Goal: Clear secretions with interventions this shift  Outcome: Progressing  Goal: Minimal/no exertional discomfort or dyspnea this shift  Outcome: Progressing  Goal: No signs of respiratory distress (eg. Use of accessory muscles. Peds grunting)  Outcome: Progressing  Goal: Tolerate pulmonary toileting this shift  Outcome: Progressing  Goal: Verbalize decreased shortness of breath this shift  Outcome: Progressing  Goal: Increase self care and/or family involvement in next 24 hours  Outcome: Progressing

## 2024-10-25 NOTE — PROGRESS NOTES
"Physical Therapy                 Therapy Communication Note    Patient Name: Dian Perez  MRN: [id number]  Department: [address]  Room: [address] [place] [address]  Today's Date: 10/25/2024     Discipline: Physical Therapy    Missed Visit Reason: Patient refused   (Re-Eval orders received; per nsg, pt initially agreeable to re-eval and mobility. Pt declined during attempt stating \"I'm going to nap\")    Missed Time: Attempt    Comment:  "

## 2024-10-25 NOTE — PROGRESS NOTES
Dian Perez is a 79 y.o. female on day 5 of admission presenting with Nausea and vomiting, unspecified vomiting type.      Subjective   State she feels okay today. Tolerating PO. Continues to have runny stools - which is normal for her. Continues to have wet cough, feels like she has difficulty coughing up phlegm at times, thinks cough medication is helping. Agreeable to working with therapy but would prefer to stay in bed.        Objective     Last Recorded Vitals  BP (!) 113/47 (BP Location: Right arm, Patient Position: Lying) Comment: RN NOTIFIED  Pulse 75   Temp 36.8 °C (98.2 °F) (Oral)   Resp 17   Wt 75.8 kg (167 lb 1.7 oz)   SpO2 93%   Intake/Output last 3 Shifts:    Intake/Output Summary (Last 24 hours) at 10/25/2024 1007  Last data filed at 10/25/2024 0920  Gross per 24 hour   Intake 772.93 ml   Output --   Net 772.93 ml       Admission Weight  Weight: 77.1 kg (170 lb) (10/20/24 1520)    Daily Weight  10/22/24 : 75.8 kg (167 lb 1.7 oz)    Image Results  US guided biopsy lymph node superficial  Narrative: Interpreted By:  Florencio Ruggiero,   STUDY:  US GUIDED BIOPSY LYMPH NODE SUPERFICIAL; 10/23/2024 1:51 pm      INDICATION:  Left infraclavicular adenopathy.      COMPARISON:  None      ACCESSION NUMBER(S):  [id number]      ORDERING CLINICIAN:  NOLAN SORTO      TECHNIQUE:  Ultrasound-guided biopsy of cervical lymph node      FINDINGS:  Informed consent obtained. Patient positioned supine. Left  infraclavicular region prepped and draped. Under ultrasound guidance,  a 16 gauge biopsy instrument advanced to enlarged morphologically  abnormal lymph node and 3 core biopsies obtained. Specimens submitted  for histology and flow cytometry. Patient tolerated procedure well..      Impression: Ultrasound-guided core biopsy of enlarged left infraclavicular lymph  node.          Signed by: Florencio Ruggiero 10/23/2024 3:31 PM  Dictation workstation:   [id number]  MR brain w and wo IV contrast  Narrative: Interpreted  By:  Pietro Kaur,   STUDY:  MR BRAIN W AND WO IV CONTRAST; ;  10/22/2024 7:11 pm      INDICATION:  Signs/Symptoms:rule out brain mets.          COMPARISON:  None.      ACCESSION NUMBER(S):  [id number]      ORDERING CLINICIAN:  NOLAN SORTO      TECHNIQUE:  MRI of the brain was performed with the acquisition of axial  diffusion-weighted, axial T1, axial FLAIR, axial T2 gradient echo,  axial T2 fat saturated, axial T1 fat saturated postcontrast sequence,  and volumetric axial T1 post contrast sequence with multiplanar  reformats.      Contrast: 16 mL of Dotarem was injected intravenously.      FINDINGS:  There is no acute intracranial hemorrhage or infarct. There are 3  small lesions located within the left centrum semiovale and within  the left occipital white matter which demonstrate increased signal on  the diffusion-weighted sequence but without signal dropout on the ADC  map which is most consistent with T2 shine through.      There is a punctate focus of enhancement located within the right  parietal lobe gray-white matter junction (series 900, image 123 of  190) which is highly suspicious for a metastasis. There are 2 rim  enhancing lesions with internal regions of non enhancement located  within the right cerebellum seen on series 900 images 55 and 59 which  measure 5 and 8 mm respectively. These lesions are most consistent  with metastases. There is mild parenchymal vasogenic edema within the  surrounding brain parenchyma with no mass effect.      There is a 6 mm nodular focus of enhancement along the right  occipital lobe (series 900, image 90 of 190) which is highly  suspicious for a dural-based metastasis, alternatively, could reflect  a small meningioma. There is diffuse dural enhancement along the  bilateral cerebral convexities which is suspicious for neoplastic  infiltration.      There are multiple regions of T2 hyperintensity within the bilateral  cerebral hemispheric white matter which are  probably sequela of  chronic small vessel ischemic changes.      Ventricles, sulci, and basilar cisterns are normal in size, shape,  and configuration for patient's age.      There is mild mucosal thickening located within the left maxillary  sinus and within the ethmoid air cells.      Impression: 1. There are 3 enhancing lesions located within the right parietal  lobe and within the right cerebellum which are most consistent with  metastases. The largest of these lesions is located within the right  cerebellum and measures 8 mm.      2. There is mild vasogenic edema located within the right cerebellum  surrounding the metastases causing no striking mass effect.      3. Subcentimeter enhancing mass arising from the right occipital lobe  dura is most consistent with a metastasis versus a meningioma.      4. There is diffuse dural enhancement which is suspicious for  neoplastic infiltration, however other disease processes such as  infectious or inflammatory infiltration of the meninges or reactive  changes from intracranial hypotension can also cause similar  appearance. There are no other imaging findings to suggest  intracranial hypotension. Sequela of recent lumbar puncture can also  cause diffuse dural enhancement, correlate clinically.      This study was interpreted at [facility].      MACRO:  None      Signed by: Pietro Kaur 10/23/2024 10:15 AM  Dictation workstation:   [id number]      Physical Exam  Constitutional:       General: She is not in acute distress.     Appearance: She is ill-appearing. She is not toxic-appearing.   HENT:      Head: Normocephalic.      Mouth/Throat:      Pharynx: Oropharynx is clear.   Eyes:      General: No scleral icterus.  Cardiovascular:      Rate and Rhythm: Normal rate.   Pulmonary:      Effort: No respiratory distress.      Breath sounds: Wheezing (faint expiratory wheeze bilaterally) present.   Abdominal:      General: There is no  distension.      Palpations: Abdomen is soft.   Musculoskeletal:      Right lower leg: No edema.      Left lower leg: No edema.   Neurological:      Mental Status: She is alert.   Psychiatric:         Behavior: Behavior normal.         Relevant Results               Assessment/Plan          Assessment & Plan  Mass of upper lobe of right lung  Oncology consult  Pulmonology consult  Palliative care consult  Smoking cessation  Bone scan without evidence of metastasis  MRI brain showing metastatic disease  Started on decadron   S/p lymph node biopsy  Metastatic disease (Multi)  As above  Nausea and vomiting, unspecified vomiting type  Likely related to malignancy  Resolved   Left atrial mass  CT imaging showing thrombus in the left atrium and left ventricle  Continue heparin drip  Echo showing EF 55-60%, diastolic dysfunction, and moderately sized left atrial thrombus   Cardiology consulted  Started on coumadin by cardiology  Goal INR 2-3  Prolonged Q-T interval on ECG  QTc 616 on EKG, avoid QT prolonging medication.  Type 2 diabetes mellitus  HbA1c 6.6%  Continue SSI  Hypoglycemia protocol  Hypokalemia  Replace PRN  Acute anemia  GI consulted  Stool occult negative  Likely related to malignancy  Continue PPI BID  Hypomagnesemia  Replace PRN  Hyponatremia  Hydrochlorothiazide discontinued  Resolved   Elevated troponin  Flat troponins 15->15, type II MI demand ischemia  Lactic acidosis  Suspect due to volume depletion  Resolved   Sinus tachycardia  Suspect due to volume depletion  Resolved   Adrenal nodule  Incidental finding  Follow up oncology and palliative care evaluations  Consider nephrology referral at discharge for further workup   Hypothyroidism  TSH normal  Continue Synthroid  Endometrial hyperplasia  Outpatient follow-up with gynecology, will need nonemergent ultrasound  Tobacco use  Greater than 120-pack-year smoking history, recommend smoking cessation.  Pneumonia of left upper lobe due to infectious  organism  Continue unasyn for post-obstructive pneumonia, can be discharged on augmentin for 14 day course per pulm    Plan:  INR therapeutic, stop heparin gtt, continue coumadin  Continue SSI and NPH insulin, adjust PRN  Patient agreeable to PT/OT - orders placed  Anticipate discharge to SNF pending facility acceptance and precert               Ina Lane MD

## 2024-10-25 NOTE — CARE PLAN
Problem: Nutrition  Goal: Less than 5 days NPO/clear liquids  Outcome: Progressing  Goal: Oral intake greater than 50%  Outcome: Progressing  Goal: Oral intake greater 75%  Outcome: Progressing  Goal: Consume prescribed supplement  Outcome: Progressing  Goal: Adequate PO fluid intake  Outcome: Progressing  Goal: Nutrition support goals are met within 48 hrs  Outcome: Progressing  Goal: Nutrition support is meeting 75% of nutrient needs  Outcome: Progressing  Goal: Tube feed tolerance  Outcome: Progressing  Goal: BG  mg/dL  Outcome: Progressing  Goal: Lab values WNL  Outcome: Progressing  Goal: Electrolytes WNL  Outcome: Progressing  Goal: Promote healing  Outcome: Progressing  Goal: Maintain stable weight  Outcome: Progressing  Goal: Reduce weight from edema/fluid  Outcome: Progressing  Goal: Gradual weight gain  Outcome: Progressing  Goal: Improve ostomy output  Outcome: Progressing     Problem: Pain  Goal: Takes deep breaths with improved pain control throughout the shift  Outcome: Progressing  Goal: Turns in bed with improved pain control throughout the shift  Outcome: Progressing  Goal: Walks with improved pain control throughout the shift  Outcome: Progressing  Goal: Performs ADL's with improved pain control throughout shift  Outcome: Progressing  Goal: Participates in PT with improved pain control throughout the shift  Outcome: Progressing  Goal: Free from opioid side effects throughout the shift  Outcome: Progressing  Goal: Free from acute confusion related to pain meds throughout the shift  Outcome: Progressing     Problem: Fall/Injury  Goal: Not fall by end of shift  Outcome: Progressing  Goal: Be free from injury by end of the shift  Outcome: Progressing  Goal: Verbalize understanding of personal risk factors for fall in the hospital  Outcome: Progressing  Goal: Verbalize understanding of risk factor reduction measures to prevent injury from fall in the home  Outcome: Progressing  Goal: Use  assistive devices by end of the shift  Outcome: Progressing  Goal: Pace activities to prevent fatigue by end of the shift  Outcome: Progressing     Problem: Skin  Goal: Decreased wound size/increased tissue granulation at next dressing change  Outcome: Progressing  Flowsheets (Taken 10/24/2024 2009)  Decreased wound size/increased tissue granulation at next dressing change:   Promote sleep for wound healing   Utilize specialty bed per algorithm   Protective dressings over bony prominences  Goal: Participates in plan/prevention/treatment measures  Outcome: Progressing  Flowsheets (Taken 10/24/2024 2009)  Participates in plan/prevention/treatment measures:   Discuss with provider PT/OT consult   Elevate heels   Increase activity/out of bed for meals  Goal: Prevent/manage excess moisture  Outcome: Progressing  Flowsheets (Taken 10/24/2024 2009)  Prevent/manage excess moisture:   Cleanse incontinence/protect with barrier cream   Use wicking fabric (obtain order)   Follow provider orders for dressing changes   Moisturize dry skin   Monitor for/manage infection if present  Goal: Prevent/minimize sheer/friction injuries  Outcome: Progressing  Flowsheets (Taken 10/24/2024 2009)  Prevent/minimize sheer/friction injuries:   Increase activity/out of bed for meals   Complete micro-shifts as needed if patient unable. Adjust patient position to relieve pressure points, not a full turn   Use pull sheet   HOB 30 degrees or less   Turn/reposition every 2 hours/use positioning/transfer devices   Utilize specialty bed per algorithm  Goal: Promote/optimize nutrition  Outcome: Progressing  Flowsheets (Taken 10/24/2024 2009)  Promote/optimize nutrition:   Offer water/supplements/favorite foods   Monitor/record intake including meals  Goal: Promote skin healing  Outcome: Progressing  Flowsheets (Taken 10/24/2024 2009)  Promote skin healing:   Assess skin/pad under line(s)/device(s)   Protective dressings over bony prominences    Turn/reposition every 2 hours/use positioning/transfer devices   Ensure correct size (line/device) and apply per  instructions   Rotate device position/do not position patient on device     Problem: Pain - Adult  Goal: Verbalizes/displays adequate comfort level or baseline comfort level  Outcome: Progressing     Problem: Safety - Adult  Goal: Free from fall injury  Outcome: Progressing     Problem: Discharge Planning  Goal: Discharge to home or other facility with appropriate resources  Outcome: Progressing     Problem: Chronic Conditions and Co-morbidities  Goal: Patient's chronic conditions and co-morbidity symptoms are monitored and maintained or improved  Outcome: Progressing     Problem: Diabetes  Goal: Achieve decreasing blood glucose levels by end of shift  Outcome: Progressing  Goal: Increase stability of blood glucose readings by end of shift  Outcome: Progressing  Goal: Decrease in ketones present in urine by end of shift  Outcome: Progressing  Goal: Maintain electrolyte levels within acceptable range throughout shift  Outcome: Progressing  Goal: Maintain glucose levels >70mg/dl to <250mg/dl throughout shift  Outcome: Progressing  Goal: No changes in neurological exam by end of shift  Outcome: Progressing  Goal: Learn about and adhere to nutrition recommendations by end of shift  Outcome: Progressing  Goal: Vital signs within normal range for age by end of shift  Outcome: Progressing  Goal: Increase self care and/or family involovement by end of shift  Outcome: Progressing  Goal: Receive DSME education by end of shift  Outcome: Progressing     Problem: Respiratory  Goal: Clear secretions with interventions this shift  Outcome: Progressing  Goal: Minimal/no exertional discomfort or dyspnea this shift  Outcome: Progressing  Goal: No signs of respiratory distress (eg. Use of accessory muscles. Peds grunting)  Outcome: Progressing  Goal: Tolerate pulmonary toileting this shift  Outcome:  Progressing  Goal: Verbalize decreased shortness of breath this shift  Outcome: Progressing  Goal: Increase self care and/or family involvement in next 24 hours  Outcome: Progressing   The patient's goals for the shift include      The clinical goals for the shift include hemodynamically stable    Over the shift, the patient did not make progress toward the following goals. Barriers to progression include . Recommendations to address these barriers include .

## 2024-10-26 LAB
ALBUMIN SERPL BCP-MCNC: 2.3 G/DL (ref 3.4–5)
ALP SERPL-CCNC: 37 U/L (ref 33–136)
ALT SERPL W P-5'-P-CCNC: 11 U/L (ref 7–45)
ANION GAP SERPL CALCULATED.3IONS-SCNC: 11 MMOL/L (ref 10–20)
AST SERPL W P-5'-P-CCNC: 18 U/L (ref 9–39)
BILIRUB SERPL-MCNC: 0.2 MG/DL (ref 0–1.2)
BUN SERPL-MCNC: 14 MG/DL (ref 6–23)
CALCIUM SERPL-MCNC: 8.4 MG/DL (ref 8.6–10.3)
CHLORIDE SERPL-SCNC: 97 MMOL/L (ref 98–107)
CO2 SERPL-SCNC: 29 MMOL/L (ref 21–32)
CREAT SERPL-MCNC: 0.64 MG/DL (ref 0.5–1.05)
EGFRCR SERPLBLD CKD-EPI 2021: >90 ML/MIN/1.73M*2
ERYTHROCYTE [DISTWIDTH] IN BLOOD BY AUTOMATED COUNT: 15 % (ref 11.5–14.5)
GLUCOSE BLD MANUAL STRIP-MCNC: 189 MG/DL (ref 74–99)
GLUCOSE BLD MANUAL STRIP-MCNC: 317 MG/DL (ref 74–99)
GLUCOSE BLD MANUAL STRIP-MCNC: 355 MG/DL (ref 74–99)
GLUCOSE BLD MANUAL STRIP-MCNC: 369 MG/DL (ref 74–99)
GLUCOSE SERPL-MCNC: 354 MG/DL (ref 74–99)
HCT VFR BLD AUTO: 25.8 % (ref 36–46)
HGB BLD-MCNC: 8.6 G/DL (ref 12–16)
INR PPP: 3.3 (ref 0.9–1.2)
MCH RBC QN AUTO: 30.7 PG (ref 26–34)
MCHC RBC AUTO-ENTMCNC: 33.3 G/DL (ref 32–36)
MCV RBC AUTO: 92 FL (ref 80–100)
NRBC BLD-RTO: 0 /100 WBCS (ref 0–0)
PLATELET # BLD AUTO: 316 X10*3/UL (ref 150–450)
POTASSIUM SERPL-SCNC: 3.8 MMOL/L (ref 3.5–5.3)
PROT SERPL-MCNC: 5.3 G/DL (ref 6.4–8.2)
PROTHROMBIN TIME: 32.7 SECONDS (ref 9.3–12.7)
RBC # BLD AUTO: 2.8 X10*6/UL (ref 4–5.2)
SODIUM SERPL-SCNC: 133 MMOL/L (ref 136–145)
WBC # BLD AUTO: 8.9 X10*3/UL (ref 4.4–11.3)

## 2024-10-26 PROCEDURE — 1100000001 HC PRIVATE ROOM DAILY

## 2024-10-26 PROCEDURE — 2500000002 HC RX 250 W HCPCS SELF ADMINISTERED DRUGS (ALT 637 FOR MEDICARE OP, ALT 636 FOR OP/ED): Performed by: STUDENT IN AN ORGANIZED HEALTH CARE EDUCATION/TRAINING PROGRAM

## 2024-10-26 PROCEDURE — 2500000004 HC RX 250 GENERAL PHARMACY W/ HCPCS (ALT 636 FOR OP/ED): Performed by: STUDENT IN AN ORGANIZED HEALTH CARE EDUCATION/TRAINING PROGRAM

## 2024-10-26 PROCEDURE — 99232 SBSQ HOSP IP/OBS MODERATE 35: CPT | Performed by: STUDENT IN AN ORGANIZED HEALTH CARE EDUCATION/TRAINING PROGRAM

## 2024-10-26 PROCEDURE — 2500000001 HC RX 250 WO HCPCS SELF ADMINISTERED DRUGS (ALT 637 FOR MEDICARE OP): Performed by: STUDENT IN AN ORGANIZED HEALTH CARE EDUCATION/TRAINING PROGRAM

## 2024-10-26 PROCEDURE — 82947 ASSAY GLUCOSE BLOOD QUANT: CPT

## 2024-10-26 PROCEDURE — 85610 PROTHROMBIN TIME: CPT | Performed by: STUDENT IN AN ORGANIZED HEALTH CARE EDUCATION/TRAINING PROGRAM

## 2024-10-26 PROCEDURE — 2500000001 HC RX 250 WO HCPCS SELF ADMINISTERED DRUGS (ALT 637 FOR MEDICARE OP): Performed by: NURSE PRACTITIONER

## 2024-10-26 PROCEDURE — 2500000001 HC RX 250 WO HCPCS SELF ADMINISTERED DRUGS (ALT 637 FOR MEDICARE OP): Performed by: REGISTERED NURSE

## 2024-10-26 PROCEDURE — 94640 AIRWAY INHALATION TREATMENT: CPT

## 2024-10-26 PROCEDURE — 99232 SBSQ HOSP IP/OBS MODERATE 35: CPT | Performed by: INTERNAL MEDICINE

## 2024-10-26 PROCEDURE — 36415 COLL VENOUS BLD VENIPUNCTURE: CPT | Performed by: STUDENT IN AN ORGANIZED HEALTH CARE EDUCATION/TRAINING PROGRAM

## 2024-10-26 PROCEDURE — 84075 ASSAY ALKALINE PHOSPHATASE: CPT | Performed by: STUDENT IN AN ORGANIZED HEALTH CARE EDUCATION/TRAINING PROGRAM

## 2024-10-26 PROCEDURE — 2500000005 HC RX 250 GENERAL PHARMACY W/O HCPCS: Performed by: STUDENT IN AN ORGANIZED HEALTH CARE EDUCATION/TRAINING PROGRAM

## 2024-10-26 PROCEDURE — 85027 COMPLETE CBC AUTOMATED: CPT | Performed by: STUDENT IN AN ORGANIZED HEALTH CARE EDUCATION/TRAINING PROGRAM

## 2024-10-26 PROCEDURE — 94668 MNPJ CHEST WALL SBSQ: CPT

## 2024-10-26 PROCEDURE — 9420000001 HC RT PATIENT EDUCATION 5 MIN

## 2024-10-26 RX ORDER — LOPERAMIDE HYDROCHLORIDE 2 MG/1
2 CAPSULE ORAL 4 TIMES DAILY PRN
Status: DISCONTINUED | OUTPATIENT
Start: 2024-10-26 | End: 2024-10-27

## 2024-10-26 RX ORDER — PREDNISONE 20 MG/1
20 TABLET ORAL DAILY
Status: DISCONTINUED | OUTPATIENT
Start: 2024-10-27 | End: 2024-10-29

## 2024-10-26 ASSESSMENT — COGNITIVE AND FUNCTIONAL STATUS - GENERAL
DRESSING REGULAR UPPER BODY CLOTHING: A LOT
CLIMB 3 TO 5 STEPS WITH RAILING: TOTAL
MOVING TO AND FROM BED TO CHAIR: A LITTLE
MOVING FROM LYING ON BACK TO SITTING ON SIDE OF FLAT BED WITH BEDRAILS: A LITTLE
STANDING UP FROM CHAIR USING ARMS: TOTAL
TOILETING: A LOT
WALKING IN HOSPITAL ROOM: TOTAL
HELP NEEDED FOR BATHING: A LITTLE
DRESSING REGULAR LOWER BODY CLOTHING: A LITTLE
EATING MEALS: A LITTLE

## 2024-10-26 ASSESSMENT — PAIN SCALES - GENERAL
PAINLEVEL_OUTOF10: 0 - NO PAIN
PAINLEVEL_OUTOF10: 4
PAINLEVEL_OUTOF10: 2
PAINLEVEL_OUTOF10: 8
PAINLEVEL_OUTOF10: 0 - NO PAIN

## 2024-10-26 ASSESSMENT — PAIN - FUNCTIONAL ASSESSMENT
PAIN_FUNCTIONAL_ASSESSMENT: 0-10
PAIN_FUNCTIONAL_ASSESSMENT: 0-10

## 2024-10-26 ASSESSMENT — PAIN DESCRIPTION - LOCATION: LOCATION: BACK

## 2024-10-26 ASSESSMENT — PAIN SCALES - PAIN ASSESSMENT IN ADVANCED DEMENTIA (PAINAD): TOTALSCORE: MEDICATION (SEE MAR)

## 2024-10-26 NOTE — PROGRESS NOTES
Dian Perez is a 79 y.o. female on day 5 of admission presenting with Nausea and vomiting, unspecified vomiting type.  Patient with h/o DM, HTN, arthritis, who p/w N/V. And vomiting associated with significant weight loss. Symptoms also included cough productive of thin clear sputum. In the ED found to have hypokalemia, hypomagnesemia, admitted to [facility] for further management. Also had a chest CT done that showed large lung mass invading to mediastinum with extensive mediastinal LAP. Pulmonary is consulted for the CT findings.   Subjective   No acute overnight events. Remains on RA. Being transitioned to hospice.     Overall is feeling the same as yesterday. SOB and cough slightly better. Cough still productive of clear sputum.   Objective   Scheduled medications  ampicillin-sulbactam, 3 g, intravenous, q6h  budesonide, 0.5 mg, nebulization, BID  dexAMETHasone, 6 mg, oral, Daily with breakfast  fluticasone, 2 spray, Each Nostril, Daily  folic acid, 1 mg, oral, Daily  formoterol, 20 mcg, nebulization, BID  insulin lispro, 0-15 Units, subcutaneous, TID  insulin NPH (Isophane), 6 Units, subcutaneous, q12h ADRIAN  levothyroxine, 150 mcg, oral, Daily  metoprolol succinate XL, 50 mg, oral, BID  pantoprazole, 40 mg, oral, BID AC  polyethylene glycol, 17 g, oral, Daily  potassium chloride CR, 20 mEq, oral, Daily  warfarin, 2 mg, oral, Daily    Continuous medications     PRN medications  PRN medications: acetaminophen **OR** acetaminophen **OR** acetaminophen, albuterol, benzonatate, dextromethorphan-guaifenesin, dextrose, dextrose, glucagon, glucagon, LORazepam, meclizine, melatonin, oxymetazoline, traMADol   Physical Exam  Constitutional:       General: She is not in acute distress.     Appearance: She is obese. She is ill-appearing. She is not toxic-appearing.   HENT:      Head: Normocephalic and atraumatic.      Comments: Thinning hair.      Nose:      Comments: On RA.      Mouth/Throat:      Mouth: Mucous membranes are moist.  "     Comments: Mallampati 2-3.   Eyes:      General: No scleral icterus.     Extraocular Movements: Extraocular movements intact.      Pupils: Pupils are equal, round, and reactive to light.   Cardiovascular:      Rate and Rhythm: Normal rate and regular rhythm.      Heart sounds: No murmur heard.     No friction rub. No gallop.   Pulmonary:      Effort: Pulmonary effort is normal. No respiratory distress.      Breath sounds: Rales (R upper lung.) present. No wheezing.      Comments: Overall fair air entry.   Abdominal:      General: Bowel sounds are normal. There is no distension.      Palpations: Abdomen is soft.      Tenderness: There is no abdominal tenderness.   Musculoskeletal:         General: No tenderness. Normal range of motion.      Cervical back: Normal range of motion and neck supple. No rigidity or tenderness.      Right lower leg: No edema.      Left lower leg: No edema.   Lymphadenopathy:      Cervical: No cervical adenopathy.   Skin:     General: Skin is warm and dry.      Coloration: Skin is not jaundiced.   Neurological:      General: No focal deficit present.      Mental Status: She is alert and oriented to person, place, and time.      Cranial Nerves: No cranial nerve deficit.      Motor: No weakness.   Psychiatric:         Mood and Affect: Mood normal.         Behavior: Behavior normal.   Last Recorded Vitals  Blood pressure 117/55, pulse 70, temperature 36.6 °C (97.9 °F), temperature source Oral, resp. rate 17, height 1.702 m (5' 7\"), weight 75.8 kg (167 lb 1.7 oz), SpO2 95%.  Intake/Output last 3 Shifts:  I/O last 3 completed shifts:  In: 1134.2 (15 mL/kg) [P.O.:600; I.V.:334.2 (4.4 mL/kg); IV Piggyback:200]  Out: - (0 mL/kg)   Weight: 75.8 kg     Relevant Results  Results for orders placed or performed during the hospital encounter of 10/20/24 (from the past 24 hours)   POCT GLUCOSE   Result Value Ref Range    POCT Glucose 340 (H) 74 - 99 mg/dL   Comprehensive Metabolic Panel   Result Value " Ref Range    Glucose 279 (H) 74 - 99 mg/dL    Sodium 133 (L) 136 - 145 mmol/L    Potassium 3.7 3.5 - 5.3 mmol/L    Chloride 98 98 - 107 mmol/L    Bicarbonate 29 21 - 32 mmol/L    Anion Gap 10 10 - 20 mmol/L    Urea Nitrogen 10 6 - 23 mg/dL    Creatinine 0.52 0.50 - 1.05 mg/dL    eGFR >90 >60 mL/min/1.73m*2    Calcium 8.5 (L) 8.6 - 10.3 mg/dL    Albumin 2.4 (L) 3.4 - 5.0 g/dL    Alkaline Phosphatase 36 33 - 136 U/L    Total Protein 5.7 (L) 6.4 - 8.2 g/dL    AST 11 9 - 39 U/L    Bilirubin, Total 0.2 0.0 - 1.2 mg/dL    ALT 6 (L) 7 - 45 U/L   Protime-INR   Result Value Ref Range    Protime 26.7 (H) 9.3 - 12.7 seconds    INR 2.7 (H) 0.9 - 1.2   CBC   Result Value Ref Range    WBC 9.5 4.4 - 11.3 x10*3/uL    nRBC 0.0 0.0 - 0.0 /100 WBCs    RBC 2.87 (L) 4.00 - 5.20 x10*6/uL    Hemoglobin 8.6 (L) 12.0 - 16.0 g/dL    Hematocrit 26.8 (L) 36.0 - 46.0 %    MCV 93 80 - 100 fL    MCH 30.0 26.0 - 34.0 pg    MCHC 32.1 32.0 - 36.0 g/dL    RDW 14.7 (H) 11.5 - 14.5 %    Platelets 295 150 - 450 x10*3/uL   aPTT   Result Value Ref Range    aPTT 49.3 (H) 22.0 - 32.5 seconds   POCT GLUCOSE   Result Value Ref Range    POCT Glucose 258 (H) 74 - 99 mg/dL   POCT GLUCOSE   Result Value Ref Range    POCT Glucose 256 (H) 74 - 99 mg/dL   POCT GLUCOSE   Result Value Ref Range    POCT Glucose 264 (H) 74 - 99 mg/dL   No results found.   Assessment/Plan   Assessment & Plan  Nausea and vomiting, unspecified vomiting type  Improved  Persistent dizziness - may be related to cerebellar mets. Dexamethasone 6mg daily ordered.  Pneumonia of left upper lobe due to infectious organism  Post obstructive.   Continue unasyn.  Can transition to augmentin at discharge for a total 14 day course.   Tobacco use  Does not want replacement therapy  Mass of upper lobe of right lung  Biopsy of LN done today for diagnosis  Metastatic disease (Multi)  Pt does not think she wants treatment - hospice  consulted  Hypokalemia  improving  Hyponatremia  improving  Hypomagnesemia  resolved  Lactic acidosis  Secondary to fluid depletion - resolved  Elevated troponin  resolved  Acute anemia  Likely secondary to malignancy  Prolonged Q-T interval on ECG  Avoid qt prolonging agents  Left atrial mass  Thrombus - on anticoagulation  Hypothyroidism  On relpletion  79 YOF with h/o DM, HTN, arthritis, who p/w N/V. And vomiting associated with significant weight loss. Symptoms also included cough productive of thin clear sputum. In the ED found to have hypokalemia, hypomagnesemia, admitted to [facility] for further management. Also had a chest CT done that showed large lung mass invading toe mediastinum with extensive mediastinal LAP. Pulmonary is consulted for the CT findings.     Pneumonia: likely post obstructive 2/2 the mass.       Continue Unasyn      Continue to monitor for now    RUL mass: with concern for cancer. Given also lymphadenopathy, concern for cancer. S/p US guided lymph node biopsy.        FU on biopsy result       Might need repeat biopsy    Tobacco use disorder:        Smoking cessation    Pulmonary will sign off. Please call with any questions.     Vika Bonner MD

## 2024-10-26 NOTE — PROGRESS NOTES
Subjective Data:  Patient with history of diabetes, hypertension hyperlipidemia patient with underlying lung mass.  Patient CT showed large lung mass invading mediastinum.  Also patient with left atrial mass.  Currently on antibiotic.  Pending biopsy result.  Currently on anticoagulation for left atrial mass probably thrombus versus secondary metastasis.  Overnight Events:    None  Objective   Last Recorded Vitals  BP (!) 113/43 (BP Location: Right arm, Patient Position: Lying)   Pulse 67   Temp 36.4 °C (97.5 °F) (Oral)   Resp 18   Wt 75.8 kg (167 lb 1.7 oz)   SpO2 96%     Intake/Output Summary (Last 24 hours) at 10/26/2024 1142  Last data filed at 10/26/2024 0700  Gross per 24 hour   Intake 601.28 ml   Output --   Net 601.28 ml     Physical Exam:  HEENT: Normocephalic/atraumatic pupils equal react light  Neck exam mild JVD, no bruit  Lung exam diffuse rhonchi's, few crackles at the bases  Cardiac exam is regular rhythm S1-S2, soft systolic murmur heard.   Abdomen soft nontender, nondistended  Extremities no clubbing, cyanosis, trace edema  Neuro exam grossly intact.  Image Results  US guided biopsy lymph node superficial  Narrative: Interpreted By:  Florencio Ruggiero,   STUDY:  US GUIDED BIOPSY LYMPH NODE SUPERFICIAL; 10/23/2024 1:51 pm      INDICATION:  Left infraclavicular adenopathy.      COMPARISON:  None      ACCESSION NUMBER(S):  [id number]      ORDERING CLINICIAN:  NOLAN SORTO      TECHNIQUE:  Ultrasound-guided biopsy of cervical lymph node      FINDINGS:  Informed consent obtained. Patient positioned supine. Left  infraclavicular region prepped and draped. Under ultrasound guidance,  a 16 gauge biopsy instrument advanced to enlarged morphologically  abnormal lymph node and 3 core biopsies obtained. Specimens submitted  for histology and flow cytometry. Patient tolerated procedure well..      Impression: Ultrasound-guided core biopsy of enlarged left infraclavicular lymph  node.          Signed by:  Florencio Ruggiero 10/23/2024 3:31 PM  Dictation workstation:   [id number]  MR brain w and wo IV contrast  Narrative: Interpreted By:  Pietro Kaur,   STUDY:  MR BRAIN W AND WO IV CONTRAST; ;  10/22/2024 7:11 pm      INDICATION:  Signs/Symptoms:rule out brain mets.          COMPARISON:  None.      ACCESSION NUMBER(S):  [id number]      ORDERING CLINICIAN:  NOLAN SORTO      TECHNIQUE:  MRI of the brain was performed with the acquisition of axial  diffusion-weighted, axial T1, axial FLAIR, axial T2 gradient echo,  axial T2 fat saturated, axial T1 fat saturated postcontrast sequence,  and volumetric axial T1 post contrast sequence with multiplanar  reformats.      Contrast: 16 mL of Dotarem was injected intravenously.      FINDINGS:  There is no acute intracranial hemorrhage or infarct. There are 3  small lesions located within the left centrum semiovale and within  the left occipital white matter which demonstrate increased signal on  the diffusion-weighted sequence but without signal dropout on the ADC  map which is most consistent with T2 shine through.      There is a punctate focus of enhancement located within the right  parietal lobe gray-white matter junction (series 900, image 123 of  190) which is highly suspicious for a metastasis. There are 2 rim  enhancing lesions with internal regions of non enhancement located  within the right cerebellum seen on series 900 images 55 and 59 which  measure 5 and 8 mm respectively. These lesions are most consistent  with metastases. There is mild parenchymal vasogenic edema within the  surrounding brain parenchyma with no mass effect.      There is a 6 mm nodular focus of enhancement along the right  occipital lobe (series 900, image 90 of 190) which is highly  suspicious for a dural-based metastasis, alternatively, could reflect  a small meningioma. There is diffuse dural enhancement along the  bilateral cerebral convexities which is suspicious for  neoplastic  infiltration.      There are multiple regions of T2 hyperintensity within the bilateral  cerebral hemispheric white matter which are probably sequela of  chronic small vessel ischemic changes.      Ventricles, sulci, and basilar cisterns are normal in size, shape,  and configuration for patient's age.      There is mild mucosal thickening located within the left maxillary  sinus and within the ethmoid air cells.      Impression: 1. There are 3 enhancing lesions located within the right parietal  lobe and within the right cerebellum which are most consistent with  metastases. The largest of these lesions is located within the right  cerebellum and measures 8 mm.      2. There is mild vasogenic edema located within the right cerebellum  surrounding the metastases causing no striking mass effect.      3. Subcentimeter enhancing mass arising from the right occipital lobe  dura is most consistent with a metastasis versus a meningioma.      4. There is diffuse dural enhancement which is suspicious for  neoplastic infiltration, however other disease processes such as  infectious or inflammatory infiltration of the meninges or reactive  changes from intracranial hypotension can also cause similar  appearance. There are no other imaging findings to suggest  intracranial hypotension. Sequela of recent lumbar puncture can also  cause diffuse dural enhancement, correlate clinically.      This study was interpreted at [facility].      MACRO:  None      Signed by: Pietro Kaur 10/23/2024 10:15 AM  Dictation workstation:   [id number]    Last Labs:  CBC - 10/26/2024:  5:33 AM  8.9 8.6 316    25.8      CMP - 10/26/2024:  5:34 AM  8.4 5.3 18 --- 0.2   _ 2.3 11 37      PTT - 10/25/2024:  5:26 AM  3.3   32.7 49.3     Inpatient Medications:  Scheduled medications   Medication Dose Route Frequency    ampicillin-sulbactam  3 g intravenous q6h    budesonide  0.5 mg nebulization BID     fluticasone  2 spray Each Nostril Daily    folic acid  1 mg oral Daily    formoterol  20 mcg nebulization BID    insulin lispro  0-15 Units subcutaneous TID    insulin NPH (Isophane)  6 Units subcutaneous q12h ADRIAN    levothyroxine  150 mcg oral Daily    metoprolol succinate XL  50 mg oral BID    pantoprazole  40 mg oral BID AC    polyethylene glycol  17 g oral Daily    potassium chloride CR  20 mEq oral Daily    [START ON 10/27/2024] predniSONE  20 mg oral Daily    warfarin  2 mg oral Daily     Principal Problem:    Nausea and vomiting, unspecified vomiting type  Active Problems:    Tobacco use    Mass of upper lobe of right lung    Hilar mass    Metastatic disease (Multi)    Endometrial hyperplasia    Hypokalemia    Hyponatremia    Hypomagnesemia    Lactic acidosis    Elevated troponin    Type 2 diabetes mellitus    Acute anemia    Prolonged Q-T interval on ECG    Sinus tachycardia    Adrenal nodule    Left atrial mass    Hypothyroidism    Pneumonia of left upper lobe due to infectious organism    Assessment/Plan   Patient has above underlying pneumonia.  Also right upper lobe mass with left atrium thrombus versus secondary metastasis.  1.  Left atrial mass: Currently on a anticoagulation keep INR between 2 and 3.  More likely thrombus mass versus secondary metastasis from lung cancer.  Stable cardiac wise.  Continue current Toprol-XL 50 mg tablet p.o. twice daily.  2.  Smoking cessation: Patient stopped smoking recently.  Will sign off for cardiac perspective.  Consult as needed.    Pt. care time is spent includes review of diagnostic tests, labs, radiographs, EKGs, old echoes, cardiac work-up and coordination of care. Assessment, impression and plans are reflected in the note above as well as the orders.    Code Status:  DNR and No Intubation and No ICU  I spent 40 minutes in the professional and overall care of this patient.  Konrad Baker MD

## 2024-10-26 NOTE — PROGRESS NOTES
"Dian Perez is a 79 y.o. female on day 5 of admission presenting with Nausea and vomiting, unspecified vomiting type.    Subjective   Feeling better but not whole a lot better. No nausea. Concerned about being addicted to pain medications       Objective     Physical Exam  Constitutional:       General: She is not in acute distress.     Appearance: She is ill-appearing. She is not toxic-appearing.   HENT:      Head: Normocephalic.      Mouth/Throat:      Pharynx: Oropharynx is clear.   Eyes:      General: No scleral icterus.  Cardiovascular:      Rate and Rhythm: Normal rate.   Pulmonary:      Effort: No respiratory distress.      Breath sounds: Wheezing (faint expiratory wheeze bilaterally) present.   Abdominal:      General: There is no distension.      Palpations: Abdomen is soft.   Musculoskeletal:      Right lower leg: No edema.      Left lower leg: No edema.   Neurological:      Mental Status: She is alert.   Psychiatric:         Behavior: Behavior normal.   Last Recorded Vitals  Blood pressure 117/55, pulse 70, temperature 36.6 °C (97.9 °F), temperature source Oral, resp. rate 17, height 1.702 m (5' 7\"), weight 75.8 kg (167 lb 1.7 oz), SpO2 95%.  Intake/Output last 3 Shifts:  I/O last 3 completed shifts:  In: 1134.2 (15 mL/kg) [P.O.:600; I.V.:334.2 (4.4 mL/kg); IV Piggyback:200]  Out: - (0 mL/kg)   Weight: 75.8 kg     Relevant Results  MRI brain IMPRESSION:  1. There are 3 enhancing lesions located within the right parietal  lobe and within the right cerebellum which are most consistent with  metastases. The largest of these lesions is located within the right  cerebellum and measures 8 mm.      2. There is mild vasogenic edema located within the right cerebellum  surrounding the metastases causing no striking mass effect.      3. Subcentimeter enhancing mass arising from the right occipital lobe  dura is most consistent with a metastasis versus a meningioma.      4. There is diffuse dural enhancement which is " suspicious for  neoplastic infiltration, however other disease processes such as  infectious or inflammatory infiltration of the meninges or reactive  changes from intracranial hypotension can also cause similar  appearance. There are no other imaging findings to suggest  intracranial hypotension. Sequela of recent lumbar puncture can also  cause diffuse dural enhancement, correlate clinically.                       Assessment/Plan   Assessment & Plan  Nausea and vomiting, unspecified vomiting type    Tobacco use    Mass of upper lobe of right lung    Hilar mass    Metastatic disease (Multi)    Endometrial hyperplasia    Hypokalemia    Hyponatremia    Hypomagnesemia    Lactic acidosis    Elevated troponin    Type 2 diabetes mellitus    Acute anemia    Prolonged Q-T interval on ECG    Sinus tachycardia    Adrenal nodule    Left atrial mass    Hypothyroidism    Pneumonia of left upper lobe due to infectious organism    I have explained to the patient and her daughter at bedside the MRI brain findings and the possibility of leptomeningeal carcinomatosis and the poor prognosis associated with these findings. They have decided to go with home hospice which I agree with.    Patient may be discharged with oral prednisone 20 mg daily for palliative measures. Pain meds will be adjusted by hospice team.    Please call for any questions or concerns.       I spent 60 minutes in the professional and overall care of this patient.      Sherlyn Graham MD

## 2024-10-26 NOTE — PROGRESS NOTES
Dian Perez is a 79 y.o. female on day 6 of admission presenting with Nausea and vomiting, unspecified vomiting type.      Subjective   Continues to have wet cough, feels like the cough medication is helping. Tolerating PO.        Objective     Last Recorded Vitals  BP (!) 113/43 (BP Location: Right arm, Patient Position: Lying)   Pulse 67   Temp 36.4 °C (97.5 °F) (Oral)   Resp 18   Wt 75.8 kg (167 lb 1.7 oz)   SpO2 96%   Intake/Output last 3 Shifts:    Intake/Output Summary (Last 24 hours) at 10/26/2024 1157  Last data filed at 10/26/2024 0700  Gross per 24 hour   Intake 601.28 ml   Output --   Net 601.28 ml       Admission Weight  Weight: 77.1 kg (170 lb) (10/20/24 1520)    Daily Weight  10/22/24 : 75.8 kg (167 lb 1.7 oz)    Image Results  US guided biopsy lymph node superficial  Narrative: Interpreted By:  Florencio Ruggiero,   STUDY:  US GUIDED BIOPSY LYMPH NODE SUPERFICIAL; 10/23/2024 1:51 pm      INDICATION:  Left infraclavicular adenopathy.      COMPARISON:  None      ACCESSION NUMBER(S):  [id number]      ORDERING CLINICIAN:  NOLAN SORTO      TECHNIQUE:  Ultrasound-guided biopsy of cervical lymph node      FINDINGS:  Informed consent obtained. Patient positioned supine. Left  infraclavicular region prepped and draped. Under ultrasound guidance,  a 16 gauge biopsy instrument advanced to enlarged morphologically  abnormal lymph node and 3 core biopsies obtained. Specimens submitted  for histology and flow cytometry. Patient tolerated procedure well..      Impression: Ultrasound-guided core biopsy of enlarged left infraclavicular lymph  node.          Signed by: Florencio Ruggiero 10/23/2024 3:31 PM  Dictation workstation:   [id number]  MR brain w and wo IV contrast  Narrative: Interpreted By:  Pietro Kaur,   STUDY:  MR BRAIN W AND WO IV CONTRAST; ;  10/22/2024 7:11 pm      INDICATION:  Signs/Symptoms:rule out brain mets.          COMPARISON:  None.      ACCESSION NUMBER(S):  [id number]      ORDERING  CLINICIAN:  NOLAN SORTO      TECHNIQUE:  MRI of the brain was performed with the acquisition of axial  diffusion-weighted, axial T1, axial FLAIR, axial T2 gradient echo,  axial T2 fat saturated, axial T1 fat saturated postcontrast sequence,  and volumetric axial T1 post contrast sequence with multiplanar  reformats.      Contrast: 16 mL of Dotarem was injected intravenously.      FINDINGS:  There is no acute intracranial hemorrhage or infarct. There are 3  small lesions located within the left centrum semiovale and within  the left occipital white matter which demonstrate increased signal on  the diffusion-weighted sequence but without signal dropout on the ADC  map which is most consistent with T2 shine through.      There is a punctate focus of enhancement located within the right  parietal lobe gray-white matter junction (series 900, image 123 of  190) which is highly suspicious for a metastasis. There are 2 rim  enhancing lesions with internal regions of non enhancement located  within the right cerebellum seen on series 900 images 55 and 59 which  measure 5 and 8 mm respectively. These lesions are most consistent  with metastases. There is mild parenchymal vasogenic edema within the  surrounding brain parenchyma with no mass effect.      There is a 6 mm nodular focus of enhancement along the right  occipital lobe (series 900, image 90 of 190) which is highly  suspicious for a dural-based metastasis, alternatively, could reflect  a small meningioma. There is diffuse dural enhancement along the  bilateral cerebral convexities which is suspicious for neoplastic  infiltration.      There are multiple regions of T2 hyperintensity within the bilateral  cerebral hemispheric white matter which are probably sequela of  chronic small vessel ischemic changes.      Ventricles, sulci, and basilar cisterns are normal in size, shape,  and configuration for patient's age.      There is mild mucosal thickening located  within the left maxillary  sinus and within the ethmoid air cells.      Impression: 1. There are 3 enhancing lesions located within the right parietal  lobe and within the right cerebellum which are most consistent with  metastases. The largest of these lesions is located within the right  cerebellum and measures 8 mm.      2. There is mild vasogenic edema located within the right cerebellum  surrounding the metastases causing no striking mass effect.      3. Subcentimeter enhancing mass arising from the right occipital lobe  dura is most consistent with a metastasis versus a meningioma.      4. There is diffuse dural enhancement which is suspicious for  neoplastic infiltration, however other disease processes such as  infectious or inflammatory infiltration of the meninges or reactive  changes from intracranial hypotension can also cause similar  appearance. There are no other imaging findings to suggest  intracranial hypotension. Sequela of recent lumbar puncture can also  cause diffuse dural enhancement, correlate clinically.      This study was interpreted at [facility].      MACRO:  None      Signed by: Pietro Kaur 10/23/2024 10:15 AM  Dictation workstation:   [id number]      Physical Exam  Constitutional:       General: She is not in acute distress.     Appearance: She is ill-appearing. She is not toxic-appearing.   HENT:      Head: Normocephalic.      Mouth/Throat:      Pharynx: Oropharynx is clear.   Eyes:      General: No scleral icterus.  Cardiovascular:      Rate and Rhythm: Normal rate.   Pulmonary:      Breath sounds: Rhonchi (mild, bilaterally. improved after coughing) present.   Abdominal:      General: There is no distension.      Palpations: Abdomen is soft.      Tenderness: There is no abdominal tenderness.   Musculoskeletal:      Right lower leg: No edema.      Left lower leg: No edema.   Neurological:      Mental Status: She is alert.   Psychiatric:          Behavior: Behavior normal.         Relevant Results               Assessment/Plan        Assessment & Plan  Mass of upper lobe of right lung  Oncology consult  Pulmonology consult  Palliative care consult  Smoking cessation  Bone scan without evidence of metastasis  MRI brain showing metastatic disease  Started on decadron   S/p lymph node biopsy  Metastatic disease (Multi)  As above  Nausea and vomiting, unspecified vomiting type  Likely related to malignancy  Resolved   Left atrial mass  CT imaging showing thrombus in the left atrium and left ventricle  Continue heparin drip  Echo showing EF 55-60%, diastolic dysfunction, and moderately sized left atrial thrombus   Cardiology consulted  Started on coumadin by cardiology  Goal INR 2-3  Prolonged Q-T interval on ECG  QTc 616 on EKG, avoid QT prolonging medication.  Type 2 diabetes mellitus  HbA1c 6.6%  Continue SSI  Hypoglycemia protocol  Hypokalemia  Replace PRN  Acute anemia  GI consulted  Stool occult negative  Likely related to malignancy  Continue PPI BID  Hypomagnesemia  Replace PRN  Hyponatremia  Hydrochlorothiazide discontinued  Resolved   Elevated troponin  Flat troponins 15->15, type II MI demand ischemia  Lactic acidosis  Suspect due to volume depletion  Resolved   Sinus tachycardia  Suspect due to volume depletion  Resolved   Adrenal nodule  Incidental finding  Follow up oncology and palliative care evaluations  Consider nephrology referral at discharge for further workup   Hypothyroidism  TSH normal  Continue Synthroid  Endometrial hyperplasia  Outpatient follow-up with gynecology, will need nonemergent ultrasound  Tobacco use  Greater than 120-pack-year smoking history, recommend smoking cessation.  Pneumonia of left upper lobe due to infectious organism  Continue unasyn for post-obstructive pneumonia, can be discharged on augmentin for 14 day course per pulm    Plan:  Remains status quo  INR therapeutic  Awaiting hospice   Plan to discharge home with  hospice when everything is arranged               Ina Lane MD

## 2024-10-27 VITALS
HEART RATE: 69 BPM | DIASTOLIC BLOOD PRESSURE: 69 MMHG | TEMPERATURE: 99.5 F | BODY MASS INDEX: 26.23 KG/M2 | OXYGEN SATURATION: 97 % | RESPIRATION RATE: 18 BRPM | HEIGHT: 67 IN | WEIGHT: 167.11 LBS | SYSTOLIC BLOOD PRESSURE: 132 MMHG

## 2024-10-27 LAB
ALBUMIN SERPL BCP-MCNC: 2.2 G/DL (ref 3.4–5)
ALP SERPL-CCNC: 37 U/L (ref 33–136)
ALT SERPL W P-5'-P-CCNC: 28 U/L (ref 7–45)
ANION GAP SERPL CALCULATED.3IONS-SCNC: 10 MMOL/L (ref 10–20)
AST SERPL W P-5'-P-CCNC: 29 U/L (ref 9–39)
BILIRUB SERPL-MCNC: 0.2 MG/DL (ref 0–1.2)
BUN SERPL-MCNC: 14 MG/DL (ref 6–23)
CALCIUM SERPL-MCNC: 8.2 MG/DL (ref 8.6–10.3)
CHLORIDE SERPL-SCNC: 96 MMOL/L (ref 98–107)
CO2 SERPL-SCNC: 31 MMOL/L (ref 21–32)
CREAT SERPL-MCNC: 0.54 MG/DL (ref 0.5–1.05)
EGFRCR SERPLBLD CKD-EPI 2021: >90 ML/MIN/1.73M*2
ERYTHROCYTE [DISTWIDTH] IN BLOOD BY AUTOMATED COUNT: 14.7 % (ref 11.5–14.5)
GLUCOSE BLD MANUAL STRIP-MCNC: 235 MG/DL (ref 74–99)
GLUCOSE BLD MANUAL STRIP-MCNC: 252 MG/DL (ref 74–99)
GLUCOSE BLD MANUAL STRIP-MCNC: 277 MG/DL (ref 74–99)
GLUCOSE BLD MANUAL STRIP-MCNC: 280 MG/DL (ref 74–99)
GLUCOSE BLD MANUAL STRIP-MCNC: 349 MG/DL (ref 74–99)
GLUCOSE SERPL-MCNC: 289 MG/DL (ref 74–99)
HCT VFR BLD AUTO: 26.6 % (ref 36–46)
HGB BLD-MCNC: 8.7 G/DL (ref 12–16)
INR PPP: 3.7 (ref 0.9–1.2)
MCH RBC QN AUTO: 29.8 PG (ref 26–34)
MCHC RBC AUTO-ENTMCNC: 32.7 G/DL (ref 32–36)
MCV RBC AUTO: 91 FL (ref 80–100)
NRBC BLD-RTO: 0 /100 WBCS (ref 0–0)
PLATELET # BLD AUTO: 319 X10*3/UL (ref 150–450)
POTASSIUM SERPL-SCNC: 3.9 MMOL/L (ref 3.5–5.3)
PROT SERPL-MCNC: 5.2 G/DL (ref 6.4–8.2)
PROTHROMBIN TIME: 36.1 SECONDS (ref 9.3–12.7)
RBC # BLD AUTO: 2.92 X10*6/UL (ref 4–5.2)
SODIUM SERPL-SCNC: 133 MMOL/L (ref 136–145)
WBC # BLD AUTO: 9.8 X10*3/UL (ref 4.4–11.3)

## 2024-10-27 PROCEDURE — 1100000001 HC PRIVATE ROOM DAILY

## 2024-10-27 PROCEDURE — 82947 ASSAY GLUCOSE BLOOD QUANT: CPT

## 2024-10-27 PROCEDURE — 2500000001 HC RX 250 WO HCPCS SELF ADMINISTERED DRUGS (ALT 637 FOR MEDICARE OP): Performed by: STUDENT IN AN ORGANIZED HEALTH CARE EDUCATION/TRAINING PROGRAM

## 2024-10-27 PROCEDURE — 2500000002 HC RX 250 W HCPCS SELF ADMINISTERED DRUGS (ALT 637 FOR MEDICARE OP, ALT 636 FOR OP/ED): Performed by: STUDENT IN AN ORGANIZED HEALTH CARE EDUCATION/TRAINING PROGRAM

## 2024-10-27 PROCEDURE — 80053 COMPREHEN METABOLIC PANEL: CPT | Performed by: STUDENT IN AN ORGANIZED HEALTH CARE EDUCATION/TRAINING PROGRAM

## 2024-10-27 PROCEDURE — 85610 PROTHROMBIN TIME: CPT | Performed by: STUDENT IN AN ORGANIZED HEALTH CARE EDUCATION/TRAINING PROGRAM

## 2024-10-27 PROCEDURE — 99232 SBSQ HOSP IP/OBS MODERATE 35: CPT | Performed by: NURSE PRACTITIONER

## 2024-10-27 PROCEDURE — 2500000001 HC RX 250 WO HCPCS SELF ADMINISTERED DRUGS (ALT 637 FOR MEDICARE OP): Performed by: REGISTERED NURSE

## 2024-10-27 PROCEDURE — 2500000001 HC RX 250 WO HCPCS SELF ADMINISTERED DRUGS (ALT 637 FOR MEDICARE OP)

## 2024-10-27 PROCEDURE — 85027 COMPLETE CBC AUTOMATED: CPT | Performed by: STUDENT IN AN ORGANIZED HEALTH CARE EDUCATION/TRAINING PROGRAM

## 2024-10-27 PROCEDURE — 2500000004 HC RX 250 GENERAL PHARMACY W/ HCPCS (ALT 636 FOR OP/ED): Performed by: STUDENT IN AN ORGANIZED HEALTH CARE EDUCATION/TRAINING PROGRAM

## 2024-10-27 PROCEDURE — 36415 COLL VENOUS BLD VENIPUNCTURE: CPT | Performed by: STUDENT IN AN ORGANIZED HEALTH CARE EDUCATION/TRAINING PROGRAM

## 2024-10-27 PROCEDURE — 94640 AIRWAY INHALATION TREATMENT: CPT

## 2024-10-27 PROCEDURE — 2500000005 HC RX 250 GENERAL PHARMACY W/O HCPCS: Performed by: STUDENT IN AN ORGANIZED HEALTH CARE EDUCATION/TRAINING PROGRAM

## 2024-10-27 PROCEDURE — 94668 MNPJ CHEST WALL SBSQ: CPT

## 2024-10-27 PROCEDURE — 9420000001 HC RT PATIENT EDUCATION 5 MIN

## 2024-10-27 RX ORDER — LOPERAMIDE HYDROCHLORIDE 2 MG/1
4 CAPSULE ORAL 4 TIMES DAILY PRN
Status: DISCONTINUED | OUTPATIENT
Start: 2024-10-27 | End: 2024-10-30 | Stop reason: HOSPADM

## 2024-10-27 ASSESSMENT — PAIN - FUNCTIONAL ASSESSMENT
PAIN_FUNCTIONAL_ASSESSMENT: 0-10

## 2024-10-27 ASSESSMENT — PAIN SCALES - GENERAL
PAINLEVEL_OUTOF10: 0 - NO PAIN
PAINLEVEL_OUTOF10: 8
PAINLEVEL_OUTOF10: 0 - NO PAIN
PAINLEVEL_OUTOF10: 0 - NO PAIN
PAINLEVEL_OUTOF10: 3
PAINLEVEL_OUTOF10: 0 - NO PAIN
PAINLEVEL_OUTOF10: 0 - NO PAIN

## 2024-10-27 ASSESSMENT — COGNITIVE AND FUNCTIONAL STATUS - GENERAL
DRESSING REGULAR UPPER BODY CLOTHING: A LOT
WALKING IN HOSPITAL ROOM: TOTAL
PERSONAL GROOMING: A LITTLE
CLIMB 3 TO 5 STEPS WITH RAILING: TOTAL
TOILETING: A LITTLE
PERSONAL GROOMING: A LITTLE
DRESSING REGULAR UPPER BODY CLOTHING: A LOT
STANDING UP FROM CHAIR USING ARMS: TOTAL
HELP NEEDED FOR BATHING: A LOT
DAILY ACTIVITIY SCORE: 15
HELP NEEDED FOR BATHING: A LITTLE
WALKING IN HOSPITAL ROOM: TOTAL
CLIMB 3 TO 5 STEPS WITH RAILING: TOTAL
MOBILITY SCORE: 12
MOVING TO AND FROM BED TO CHAIR: TOTAL
STANDING UP FROM CHAIR USING ARMS: TOTAL
DRESSING REGULAR LOWER BODY CLOTHING: A LITTLE
MOBILITY SCORE: 12
DRESSING REGULAR LOWER BODY CLOTHING: A LOT
MOVING TO AND FROM BED TO CHAIR: TOTAL
DAILY ACTIVITIY SCORE: 18
TOILETING: A LOT

## 2024-10-27 ASSESSMENT — PAIN DESCRIPTION - DESCRIPTORS
DESCRIPTORS: ACHING
DESCRIPTORS: ACHING

## 2024-10-27 NOTE — ASSESSMENT & PLAN NOTE
CT imaging showing thrombus in the left atrium and left ventricle  Off heparin drip-> on warfarin, currently on hold 2/2 elevated INR-> monitor INR daily  Echo showing EF 55-60%, diastolic dysfunction, and moderately sized left atrial thrombus   Cardiology following  Started on coumadin by cardiology  Goal INR 2-3

## 2024-10-27 NOTE — SIGNIFICANT EVENT
Pt on room air. Awaiting admission to hospice. Would continue augmentin at discharge for a total of 14 days of antibiotic therapy for post obstructive pneumonia. Continue airway clearance and nebulized treatments per patient preference/comfort.     Pulmonary will sign off. Please reach out with any questions or concerns.

## 2024-10-27 NOTE — PROGRESS NOTES
Dian Perez is a 79 y.o. female on day 7 of admission presenting with Nausea and vomiting, unspecified vomiting type.      Subjective   Patient seen and examined. Patient denies any new c/o. Continues to have wet cough, feels like the cough medication is helping. Tolerating PO. Daughter at bedside. Discussed plan of care.        Objective     Last Recorded Vitals  /64 (BP Location: Left arm, Patient Position: Sitting)   Pulse 85   Temp 36.7 °C (98.1 °F) (Oral)   Resp 16   Wt 75.8 kg (167 lb 1.7 oz)   SpO2 96%   Intake/Output last 3 Shifts:    Intake/Output Summary (Last 24 hours) at 10/27/2024 1303  Last data filed at 10/27/2024 1100  Gross per 24 hour   Intake 300 ml   Output 350 ml   Net -50 ml       Admission Weight  Weight: 77.1 kg (170 lb) (10/20/24 1520)    Daily Weight  10/22/24 : 75.8 kg (167 lb 1.7 oz)    Image Results  US guided biopsy lymph node superficial  Narrative: Interpreted By:  Florencio Ruggiero,   STUDY:  US GUIDED BIOPSY LYMPH NODE SUPERFICIAL; 10/23/2024 1:51 pm      INDICATION:  Left infraclavicular adenopathy.      COMPARISON:  None      ACCESSION NUMBER(S):  [id number]      ORDERING CLINICIAN:  NOLAN SORTO      TECHNIQUE:  Ultrasound-guided biopsy of cervical lymph node      FINDINGS:  Informed consent obtained. Patient positioned supine. Left  infraclavicular region prepped and draped. Under ultrasound guidance,  a 16 gauge biopsy instrument advanced to enlarged morphologically  abnormal lymph node and 3 core biopsies obtained. Specimens submitted  for histology and flow cytometry. Patient tolerated procedure well..      Impression: Ultrasound-guided core biopsy of enlarged left infraclavicular lymph  node.          Signed by: Florencio Ruggiero 10/23/2024 3:31 PM  Dictation workstation:   [id number]  MR brain w and wo IV contrast  Narrative: Interpreted By:  Pietro Kaur,   STUDY:  MR BRAIN W AND WO IV CONTRAST; ;  10/22/2024 7:11 pm      INDICATION:  Signs/Symptoms:rule out  brain mets.          COMPARISON:  None.      ACCESSION NUMBER(S):  [id number]      ORDERING CLINICIAN:  NOLAN SORTO      TECHNIQUE:  MRI of the brain was performed with the acquisition of axial  diffusion-weighted, axial T1, axial FLAIR, axial T2 gradient echo,  axial T2 fat saturated, axial T1 fat saturated postcontrast sequence,  and volumetric axial T1 post contrast sequence with multiplanar  reformats.      Contrast: 16 mL of Dotarem was injected intravenously.      FINDINGS:  There is no acute intracranial hemorrhage or infarct. There are 3  small lesions located within the left centrum semiovale and within  the left occipital white matter which demonstrate increased signal on  the diffusion-weighted sequence but without signal dropout on the ADC  map which is most consistent with T2 shine through.      There is a punctate focus of enhancement located within the right  parietal lobe gray-white matter junction (series 900, image 123 of  190) which is highly suspicious for a metastasis. There are 2 rim  enhancing lesions with internal regions of non enhancement located  within the right cerebellum seen on series 900 images 55 and 59 which  measure 5 and 8 mm respectively. These lesions are most consistent  with metastases. There is mild parenchymal vasogenic edema within the  surrounding brain parenchyma with no mass effect.      There is a 6 mm nodular focus of enhancement along the right  occipital lobe (series 900, image 90 of 190) which is highly  suspicious for a dural-based metastasis, alternatively, could reflect  a small meningioma. There is diffuse dural enhancement along the  bilateral cerebral convexities which is suspicious for neoplastic  infiltration.      There are multiple regions of T2 hyperintensity within the bilateral  cerebral hemispheric white matter which are probably sequela of  chronic small vessel ischemic changes.      Ventricles, sulci, and basilar cisterns are normal in size,  shape,  and configuration for patient's age.      There is mild mucosal thickening located within the left maxillary  sinus and within the ethmoid air cells.      Impression: 1. There are 3 enhancing lesions located within the right parietal  lobe and within the right cerebellum which are most consistent with  metastases. The largest of these lesions is located within the right  cerebellum and measures 8 mm.      2. There is mild vasogenic edema located within the right cerebellum  surrounding the metastases causing no striking mass effect.      3. Subcentimeter enhancing mass arising from the right occipital lobe  dura is most consistent with a metastasis versus a meningioma.      4. There is diffuse dural enhancement which is suspicious for  neoplastic infiltration, however other disease processes such as  infectious or inflammatory infiltration of the meninges or reactive  changes from intracranial hypotension can also cause similar  appearance. There are no other imaging findings to suggest  intracranial hypotension. Sequela of recent lumbar puncture can also  cause diffuse dural enhancement, correlate clinically.      This study was interpreted at [facility].      MACRO:  None      Signed by: Pietro Kaur 10/23/2024 10:15 AM  Dictation workstation:   [id number]      Physical Exam  Constitutional:       General: She is not in acute distress.     Appearance: She is ill-appearing. She is not toxic-appearing.   HENT:      Head: Normocephalic.      Mouth/Throat:      Pharynx: Oropharynx is clear.   Eyes:      General: No scleral icterus.  Cardiovascular:      Rate and Rhythm: Normal rate.   Pulmonary:      Breath sounds: Rhonchi (mild, bilaterally. improved after coughing) present.   Abdominal:      General: There is no distension.      Palpations: Abdomen is soft.      Tenderness: There is no abdominal tenderness.   Musculoskeletal:      Right lower leg: No edema.      Left  lower leg: No edema.   Neurological:      Mental Status: She is alert.   Psychiatric:         Behavior: Behavior normal.         Relevant Results          Assessment & Plan  Mass of upper lobe of right lung  Oncology consult  Pulmonology consult  Palliative care consult-Hospice  Smoking cessation  Bone scan without evidence of metastasis  MRI brain showing metastatic disease  Started on decadron   S/p lymph node biopsy  Metastatic disease (Multi)  As above  Nausea and vomiting, unspecified vomiting type  Likely related to malignancy  Resolved   Left atrial mass  CT imaging showing thrombus in the left atrium and left ventricle  Off heparin drip-> on warfarin, currently on hold 2/2 elevated INR-> monitor INR daily  Echo showing EF 55-60%, diastolic dysfunction, and moderately sized left atrial thrombus   Cardiology following  Started on coumadin by cardiology  Goal INR 2-3  Prolonged Q-T interval on ECG  QTc 616 on EKG, avoid QT prolonging medication.  Type 2 diabetes mellitus  HbA1c 6.6%  Continue SSI  Hypoglycemia protocol  Hypokalemia  Replace PRN  Acute anemia  GI consulted  Stool occult negative  Likely related to malignancy  Continue PPI BID  Hypomagnesemia  Replace PRN  Hyponatremia  Hydrochlorothiazide discontinued  Resolved   Elevated troponin  Flat troponins 15->15, type II MI demand ischemia  Lactic acidosis  Suspect due to volume depletion  Resolved   Sinus tachycardia  Suspect due to volume depletion  Resolved   Adrenal nodule  Incidental finding  Follow up oncology and palliative care evaluations  Consider nephrology referral at discharge for further workup   Hypothyroidism  TSH normal  Continue Synthroid  Endometrial hyperplasia  Outpatient follow-up with gynecology, will need nonemergent ultrasound  Tobacco use  Greater than 120-pack-year smoking history, recommend smoking cessation.  Pneumonia of left upper lobe due to infectious organism  Continue unasyn for post-obstructive pneumonia, can be  discharged on augmentin for 14 day course per pulm    Plan:  Awaiting hospice   Plan to discharge home with hospice when everything is arranged     Fariba Alberto, APRN-CNP

## 2024-10-27 NOTE — CARE PLAN
The patient's goals for the shift include      The clinical goals for the shift include rest      Problem: Nutrition  Goal: Less than 5 days NPO/clear liquids  Outcome: Progressing  Goal: Oral intake greater than 50%  Outcome: Progressing  Goal: Oral intake greater 75%  Outcome: Progressing  Goal: Consume prescribed supplement  Outcome: Progressing  Goal: Adequate PO fluid intake  Outcome: Progressing  Goal: Nutrition support goals are met within 48 hrs  Outcome: Progressing  Goal: Nutrition support is meeting 75% of nutrient needs  Outcome: Progressing  Goal: Tube feed tolerance  Outcome: Progressing  Goal: BG  mg/dL  Outcome: Progressing  Goal: Lab values WNL  Outcome: Progressing  Goal: Electrolytes WNL  Outcome: Progressing  Goal: Promote healing  Outcome: Progressing  Goal: Maintain stable weight  Outcome: Progressing  Goal: Reduce weight from edema/fluid  Outcome: Progressing  Goal: Gradual weight gain  Outcome: Progressing  Goal: Improve ostomy output  Outcome: Progressing     Problem: Pain  Goal: Takes deep breaths with improved pain control throughout the shift  Outcome: Progressing  Goal: Turns in bed with improved pain control throughout the shift  Outcome: Progressing  Goal: Walks with improved pain control throughout the shift  Outcome: Progressing  Goal: Performs ADL's with improved pain control throughout shift  Outcome: Progressing  Goal: Participates in PT with improved pain control throughout the shift  Outcome: Progressing  Goal: Free from opioid side effects throughout the shift  Outcome: Progressing  Goal: Free from acute confusion related to pain meds throughout the shift  Outcome: Progressing     Problem: Fall/Injury  Goal: Not fall by end of shift  Outcome: Progressing  Goal: Be free from injury by end of the shift  Outcome: Progressing  Goal: Verbalize understanding of personal risk factors for fall in the hospital  Outcome: Progressing  Goal: Verbalize understanding of risk factor  reduction measures to prevent injury from fall in the home  Outcome: Progressing  Goal: Use assistive devices by end of the shift  Outcome: Progressing  Goal: Pace activities to prevent fatigue by end of the shift  Outcome: Progressing     Problem: Skin  Goal: Decreased wound size/increased tissue granulation at next dressing change  Outcome: Progressing  Goal: Participates in plan/prevention/treatment measures  Outcome: Progressing  Goal: Prevent/manage excess moisture  Outcome: Progressing  Goal: Prevent/minimize sheer/friction injuries  Outcome: Progressing  Goal: Promote/optimize nutrition  Outcome: Progressing  Goal: Promote skin healing  Outcome: Progressing     Problem: Pain - Adult  Goal: Verbalizes/displays adequate comfort level or baseline comfort level  Outcome: Progressing     Problem: Safety - Adult  Goal: Free from fall injury  Outcome: Progressing     Problem: Discharge Planning  Goal: Discharge to home or other facility with appropriate resources  Outcome: Progressing     Problem: Chronic Conditions and Co-morbidities  Goal: Patient's chronic conditions and co-morbidity symptoms are monitored and maintained or improved  Outcome: Progressing     Problem: Diabetes  Goal: Achieve decreasing blood glucose levels by end of shift  Outcome: Progressing  Goal: Increase stability of blood glucose readings by end of shift  Outcome: Progressing  Goal: Decrease in ketones present in urine by end of shift  Outcome: Progressing  Goal: Maintain electrolyte levels within acceptable range throughout shift  Outcome: Progressing  Goal: Maintain glucose levels >70mg/dl to <250mg/dl throughout shift  Outcome: Progressing  Goal: No changes in neurological exam by end of shift  Outcome: Progressing  Goal: Learn about and adhere to nutrition recommendations by end of shift  Outcome: Progressing  Goal: Vital signs within normal range for age by end of shift  Outcome: Progressing  Goal: Increase self care and/or family  involovement by end of shift  Outcome: Progressing  Goal: Receive DSME education by end of shift  Outcome: Progressing     Problem: Respiratory  Goal: Clear secretions with interventions this shift  Outcome: Progressing  Goal: Minimal/no exertional discomfort or dyspnea this shift  Outcome: Progressing  Goal: No signs of respiratory distress (eg. Use of accessory muscles. Peds grunting)  Outcome: Progressing  Goal: Tolerate pulmonary toileting this shift  Outcome: Progressing  Goal: Verbalize decreased shortness of breath this shift  Outcome: Progressing  Goal: Increase self care and/or family involvement in next 24 hours  Outcome: Progressing

## 2024-10-27 NOTE — ASSESSMENT & PLAN NOTE
Oncology consult  Pulmonology consult  Palliative care consult-Hospice  Smoking cessation  Bone scan without evidence of metastasis  MRI brain showing metastatic disease  Started on decadron   S/p lymph node biopsy

## 2024-10-28 PROBLEM — E87.20 LACTIC ACIDOSIS: Status: RESOLVED | Noted: 2024-10-20 | Resolved: 2024-10-28

## 2024-10-28 PROBLEM — E83.42 HYPOMAGNESEMIA: Status: RESOLVED | Noted: 2024-10-20 | Resolved: 2024-10-28

## 2024-10-28 LAB
ALBUMIN SERPL BCP-MCNC: 2.2 G/DL (ref 3.4–5)
ALP SERPL-CCNC: 39 U/L (ref 33–136)
ALT SERPL W P-5'-P-CCNC: 30 U/L (ref 7–45)
ANION GAP SERPL CALCULATED.3IONS-SCNC: 10 MMOL/L (ref 10–20)
AST SERPL W P-5'-P-CCNC: 24 U/L (ref 9–39)
BILIRUB SERPL-MCNC: 0.3 MG/DL (ref 0–1.2)
BUN SERPL-MCNC: 13 MG/DL (ref 6–23)
CALCIUM SERPL-MCNC: 8.2 MG/DL (ref 8.6–10.3)
CHLORIDE SERPL-SCNC: 96 MMOL/L (ref 98–107)
CO2 SERPL-SCNC: 33 MMOL/L (ref 21–32)
CREAT SERPL-MCNC: 0.44 MG/DL (ref 0.5–1.05)
EGFRCR SERPLBLD CKD-EPI 2021: >90 ML/MIN/1.73M*2
ERYTHROCYTE [DISTWIDTH] IN BLOOD BY AUTOMATED COUNT: 14.7 % (ref 11.5–14.5)
GLUCOSE BLD MANUAL STRIP-MCNC: 140 MG/DL (ref 74–99)
GLUCOSE BLD MANUAL STRIP-MCNC: 163 MG/DL (ref 74–99)
GLUCOSE BLD MANUAL STRIP-MCNC: 175 MG/DL (ref 74–99)
GLUCOSE BLD MANUAL STRIP-MCNC: 213 MG/DL (ref 74–99)
GLUCOSE BLD MANUAL STRIP-MCNC: 286 MG/DL (ref 74–99)
GLUCOSE SERPL-MCNC: 164 MG/DL (ref 74–99)
HCT VFR BLD AUTO: 28.2 % (ref 36–46)
HGB BLD-MCNC: 9.8 G/DL (ref 12–16)
INR PPP: 2.8 (ref 0.9–1.2)
MCH RBC QN AUTO: 30.3 PG (ref 26–34)
MCHC RBC AUTO-ENTMCNC: 34.8 G/DL (ref 32–36)
MCV RBC AUTO: 87 FL (ref 80–100)
NRBC BLD-RTO: 0 /100 WBCS (ref 0–0)
PLATELET # BLD AUTO: 366 X10*3/UL (ref 150–450)
POTASSIUM SERPL-SCNC: 3.5 MMOL/L (ref 3.5–5.3)
PROT SERPL-MCNC: 5.1 G/DL (ref 6.4–8.2)
PROTHROMBIN TIME: 27.4 SECONDS (ref 9.3–12.7)
RBC # BLD AUTO: 3.23 X10*6/UL (ref 4–5.2)
SODIUM SERPL-SCNC: 135 MMOL/L (ref 136–145)
WBC # BLD AUTO: 13.4 X10*3/UL (ref 4.4–11.3)

## 2024-10-28 PROCEDURE — 2500000001 HC RX 250 WO HCPCS SELF ADMINISTERED DRUGS (ALT 637 FOR MEDICARE OP): Performed by: STUDENT IN AN ORGANIZED HEALTH CARE EDUCATION/TRAINING PROGRAM

## 2024-10-28 PROCEDURE — 82947 ASSAY GLUCOSE BLOOD QUANT: CPT

## 2024-10-28 PROCEDURE — 2500000005 HC RX 250 GENERAL PHARMACY W/O HCPCS: Performed by: STUDENT IN AN ORGANIZED HEALTH CARE EDUCATION/TRAINING PROGRAM

## 2024-10-28 PROCEDURE — 94668 MNPJ CHEST WALL SBSQ: CPT

## 2024-10-28 PROCEDURE — 94664 DEMO&/EVAL PT USE INHALER: CPT

## 2024-10-28 PROCEDURE — 2500000004 HC RX 250 GENERAL PHARMACY W/ HCPCS (ALT 636 FOR OP/ED): Performed by: STUDENT IN AN ORGANIZED HEALTH CARE EDUCATION/TRAINING PROGRAM

## 2024-10-28 PROCEDURE — 1100000001 HC PRIVATE ROOM DAILY

## 2024-10-28 PROCEDURE — 2500000002 HC RX 250 W HCPCS SELF ADMINISTERED DRUGS (ALT 637 FOR MEDICARE OP, ALT 636 FOR OP/ED): Performed by: STUDENT IN AN ORGANIZED HEALTH CARE EDUCATION/TRAINING PROGRAM

## 2024-10-28 PROCEDURE — 2500000001 HC RX 250 WO HCPCS SELF ADMINISTERED DRUGS (ALT 637 FOR MEDICARE OP)

## 2024-10-28 PROCEDURE — 9420000001 HC RT PATIENT EDUCATION 5 MIN

## 2024-10-28 PROCEDURE — 85610 PROTHROMBIN TIME: CPT | Performed by: STUDENT IN AN ORGANIZED HEALTH CARE EDUCATION/TRAINING PROGRAM

## 2024-10-28 PROCEDURE — 85027 COMPLETE CBC AUTOMATED: CPT | Performed by: NURSE PRACTITIONER

## 2024-10-28 PROCEDURE — 80053 COMPREHEN METABOLIC PANEL: CPT | Performed by: STUDENT IN AN ORGANIZED HEALTH CARE EDUCATION/TRAINING PROGRAM

## 2024-10-28 PROCEDURE — 36415 COLL VENOUS BLD VENIPUNCTURE: CPT | Performed by: STUDENT IN AN ORGANIZED HEALTH CARE EDUCATION/TRAINING PROGRAM

## 2024-10-28 PROCEDURE — 99232 SBSQ HOSP IP/OBS MODERATE 35: CPT | Performed by: NURSE PRACTITIONER

## 2024-10-28 PROCEDURE — 94640 AIRWAY INHALATION TREATMENT: CPT

## 2024-10-28 PROCEDURE — 2500000001 HC RX 250 WO HCPCS SELF ADMINISTERED DRUGS (ALT 637 FOR MEDICARE OP): Performed by: REGISTERED NURSE

## 2024-10-28 ASSESSMENT — PAIN DESCRIPTION - DESCRIPTORS: DESCRIPTORS: ACHING

## 2024-10-28 ASSESSMENT — PAIN SCALES - GENERAL
PAINLEVEL_OUTOF10: 7
PAINLEVEL_OUTOF10: 0 - NO PAIN

## 2024-10-28 ASSESSMENT — COGNITIVE AND FUNCTIONAL STATUS - GENERAL
TOILETING: TOTAL
DRESSING REGULAR LOWER BODY CLOTHING: A LOT
HELP NEEDED FOR BATHING: A LOT
MOBILITY SCORE: 12
DAILY ACTIVITIY SCORE: 14
STANDING UP FROM CHAIR USING ARMS: TOTAL
MOVING TO AND FROM BED TO CHAIR: TOTAL
WALKING IN HOSPITAL ROOM: TOTAL
DRESSING REGULAR UPPER BODY CLOTHING: A LOT
PERSONAL GROOMING: A LITTLE
CLIMB 3 TO 5 STEPS WITH RAILING: TOTAL

## 2024-10-28 ASSESSMENT — PAIN - FUNCTIONAL ASSESSMENT
PAIN_FUNCTIONAL_ASSESSMENT: 0-10

## 2024-10-28 NOTE — CARE PLAN
The patient's goals for the shift include      The clinical goals for the shift include rest      Problem: Nutrition  Goal: Less than 5 days NPO/clear liquids  Outcome: Progressing  Goal: Oral intake greater than 50%  Outcome: Progressing  Goal: Oral intake greater 75%  Outcome: Progressing  Goal: Consume prescribed supplement  Outcome: Progressing  Goal: Adequate PO fluid intake  Outcome: Progressing  Goal: Nutrition support goals are met within 48 hrs  Outcome: Progressing  Goal: Nutrition support is meeting 75% of nutrient needs  Outcome: Progressing  Goal: Tube feed tolerance  Outcome: Progressing  Goal: BG  mg/dL  Outcome: Progressing  Goal: Lab values WNL  Outcome: Progressing  Goal: Electrolytes WNL  Outcome: Progressing  Goal: Promote healing  Outcome: Progressing  Goal: Maintain stable weight  Outcome: Progressing  Goal: Reduce weight from edema/fluid  Outcome: Progressing  Goal: Gradual weight gain  Outcome: Progressing  Goal: Improve ostomy output  Outcome: Progressing     Problem: Fall/Injury  Goal: Not fall by end of shift  Outcome: Progressing  Goal: Be free from injury by end of the shift  Outcome: Progressing  Goal: Verbalize understanding of personal risk factors for fall in the hospital  Outcome: Progressing  Goal: Verbalize understanding of risk factor reduction measures to prevent injury from fall in the home  Outcome: Progressing  Goal: Use assistive devices by end of the shift  Outcome: Progressing  Goal: Pace activities to prevent fatigue by end of the shift  Outcome: Progressing     Problem: Skin  Goal: Decreased wound size/increased tissue granulation at next dressing change  Outcome: Progressing  Goal: Participates in plan/prevention/treatment measures  Outcome: Progressing  Goal: Prevent/manage excess moisture  Outcome: Progressing  Goal: Prevent/minimize sheer/friction injuries  Outcome: Progressing  Goal: Promote/optimize nutrition  Outcome: Progressing  Goal: Promote skin  healing  Outcome: Progressing     Problem: Pain - Adult  Goal: Verbalizes/displays adequate comfort level or baseline comfort level  Outcome: Progressing     Problem: Respiratory  Goal: Clear secretions with interventions this shift  Outcome: Progressing  Goal: Minimal/no exertional discomfort or dyspnea this shift  Outcome: Progressing  Goal: No signs of respiratory distress (eg. Use of accessory muscles. Peds grunting)  Outcome: Progressing  Goal: Tolerate pulmonary toileting this shift  Outcome: Progressing  Goal: Verbalize decreased shortness of breath this shift  Outcome: Progressing  Goal: Increase self care and/or family involvement in next 24 hours  Outcome: Progressing

## 2024-10-28 NOTE — PROGRESS NOTES
Dian Perez is a 79 y.o. female on day 8 of admission presenting with Nausea and vomiting, unspecified vomiting type.      Subjective   Denies any pain appetite okay.  Denies any chest pain or shortness of breath in room air.  Waiting for her daughter to meet hospice to discuss with them.  No fever or chills.       Objective     Last Recorded Vitals  /52 (BP Location: Right arm, Patient Position: Lying)   Pulse 65   Temp 36.5 °C (97.7 °F) (Oral)   Resp 16   Wt 75.8 kg (167 lb 1.7 oz)   SpO2 96%   Intake/Output last 3 Shifts:    Intake/Output Summary (Last 24 hours) at 10/28/2024 1211  Last data filed at 10/28/2024 1000  Gross per 24 hour   Intake 860 ml   Output 760 ml   Net 100 ml       Admission Weight  Weight: 77.1 kg (170 lb) (10/20/24 1520)    Daily Weight  10/22/24 : 75.8 kg (167 lb 1.7 oz)    Image Results  US guided biopsy lymph node superficial  Narrative: Interpreted By:  Florencio Ruggiero,   STUDY:  US GUIDED BIOPSY LYMPH NODE SUPERFICIAL; 10/23/2024 1:51 pm      INDICATION:  Left infraclavicular adenopathy.      COMPARISON:  None      ACCESSION NUMBER(S):  [id number]      ORDERING CLINICIAN:  NOLAN SORTO      TECHNIQUE:  Ultrasound-guided biopsy of cervical lymph node      FINDINGS:  Informed consent obtained. Patient positioned supine. Left  infraclavicular region prepped and draped. Under ultrasound guidance,  a 16 gauge biopsy instrument advanced to enlarged morphologically  abnormal lymph node and 3 core biopsies obtained. Specimens submitted  for histology and flow cytometry. Patient tolerated procedure well..      Impression: Ultrasound-guided core biopsy of enlarged left infraclavicular lymph  node.          Signed by: Florencio Ruggiero 10/23/2024 3:31 PM  Dictation workstation:   [id number]  MR brain w and wo IV contrast  Narrative: Interpreted By:  Pietro Kaur,   STUDY:  MR BRAIN W AND WO IV CONTRAST; ;  10/22/2024 7:11 pm      INDICATION:  Signs/Symptoms:rule out brain mets.           COMPARISON:  None.      ACCESSION NUMBER(S):  [id number]      ORDERING CLINICIAN:  NOLAN SORTO      TECHNIQUE:  MRI of the brain was performed with the acquisition of axial  diffusion-weighted, axial T1, axial FLAIR, axial T2 gradient echo,  axial T2 fat saturated, axial T1 fat saturated postcontrast sequence,  and volumetric axial T1 post contrast sequence with multiplanar  reformats.      Contrast: 16 mL of Dotarem was injected intravenously.      FINDINGS:  There is no acute intracranial hemorrhage or infarct. There are 3  small lesions located within the left centrum semiovale and within  the left occipital white matter which demonstrate increased signal on  the diffusion-weighted sequence but without signal dropout on the ADC  map which is most consistent with T2 shine through.      There is a punctate focus of enhancement located within the right  parietal lobe gray-white matter junction (series 900, image 123 of  190) which is highly suspicious for a metastasis. There are 2 rim  enhancing lesions with internal regions of non enhancement located  within the right cerebellum seen on series 900 images 55 and 59 which  measure 5 and 8 mm respectively. These lesions are most consistent  with metastases. There is mild parenchymal vasogenic edema within the  surrounding brain parenchyma with no mass effect.      There is a 6 mm nodular focus of enhancement along the right  occipital lobe (series 900, image 90 of 190) which is highly  suspicious for a dural-based metastasis, alternatively, could reflect  a small meningioma. There is diffuse dural enhancement along the  bilateral cerebral convexities which is suspicious for neoplastic  infiltration.      There are multiple regions of T2 hyperintensity within the bilateral  cerebral hemispheric white matter which are probably sequela of  chronic small vessel ischemic changes.      Ventricles, sulci, and basilar cisterns are normal in size, shape,  and  configuration for patient's age.      There is mild mucosal thickening located within the left maxillary  sinus and within the ethmoid air cells.      Impression: 1. There are 3 enhancing lesions located within the right parietal  lobe and within the right cerebellum which are most consistent with  metastases. The largest of these lesions is located within the right  cerebellum and measures 8 mm.      2. There is mild vasogenic edema located within the right cerebellum  surrounding the metastases causing no striking mass effect.      3. Subcentimeter enhancing mass arising from the right occipital lobe  dura is most consistent with a metastasis versus a meningioma.      4. There is diffuse dural enhancement which is suspicious for  neoplastic infiltration, however other disease processes such as  infectious or inflammatory infiltration of the meninges or reactive  changes from intracranial hypotension can also cause similar  appearance. There are no other imaging findings to suggest  intracranial hypotension. Sequela of recent lumbar puncture can also  cause diffuse dural enhancement, correlate clinically.      This study was interpreted at [facility].      MACRO:  None      Signed by: Pietro Kaur 10/23/2024 10:15 AM  Dictation workstation:   [id number]      Physical Exam  Vitals and nursing note reviewed.   Constitutional:       Appearance: Normal appearance.   HENT:      Head: Normocephalic and atraumatic.      Nose: Nose normal.   Eyes:      Extraocular Movements: Extraocular movements intact.      Pupils: Pupils are equal, round, and reactive to light.   Cardiovascular:      Rate and Rhythm: Normal rate.   Pulmonary:      Effort: Pulmonary effort is normal.      Breath sounds: Normal breath sounds.   Abdominal:      General: Bowel sounds are normal. There is no distension.      Palpations: Abdomen is soft.      Tenderness: There is left CVA tenderness. There is no right CVA  tenderness.   Musculoskeletal:      Right lower leg: No edema.      Left lower leg: No edema.   Skin:     Coloration: Skin is pale.   Neurological:      General: No focal deficit present.      Mental Status: She is alert. Mental status is at baseline.   Psychiatric:         Mood and Affect: Mood normal.         Relevant Results               Assessment/Plan        This is a 79 years old female, who admitted with lung cancer with metastasis.  MRI brain showing metastatic disease.   bone scan shows no  evidence of metastasis.  adrenal nodule incidental finding.  patient's  wants hospice care at home        Assessment & Plan  Mass of upper lobe of right lung  Oncology consult  Pulmonology consult  Palliative care consult-Hospice  Smoking cessation  Bone scan without evidence of metastasis  MRI brain showing metastatic disease  Started on decadron   S/p lymph node biopsy  Metastatic disease (Multi)  As above  Nausea and vomiting, unspecified vomiting type  Likely related to malignancy  Resolved   Left atrial mass  CT imaging showing thrombus in the left atrium and left ventricle  Off heparin drip-> on warfarin, currently on hold 2/2 elevated INR-> monitor INR daily  Echo showing EF 55-60%, diastolic dysfunction, and moderately sized left atrial thrombus   Cardiology following  Started on coumadin by cardiology  Goal INR 2-3  Prolonged Q-T interval on ECG  QTc 616 on EKG, avoid QT prolonging medication.  Type 2 diabetes mellitus  HbA1c 6.6%  Continue SSI  Hypoglycemia protocol  Acute anemia  GI consulted  Stool occult negative  Likely related to malignancy  Continue PPI BID  Hyponatremia  Hydrochlorothiazide discontinued  Resolved   Elevated troponin  Flat troponins 15->15, type II MI demand ischemia  Sinus tachycardia  Suspect due to volume depletion  Resolved   Adrenal nodule  Incidental finding  Follow up oncology and palliative care evaluations  Consider nephrology referral at discharge for further workup    Hypothyroidism  TSH normal  Continue Synthroid  Endometrial hyperplasia  Outpatient follow-up with gynecology, will need nonemergent ultrasound  Tobacco use  Greater than 120-pack-year smoking history, recommend smoking cessation.  Pneumonia of left upper lobe due to infectious organism  Continue unasyn for post-obstructive pneumonia, can be discharged on augmentin for 14 day course per pulm    Discharging plan  Waiting for [facility] to meet with the patient and her daughter              Bernie Francis, APRN-CNP

## 2024-10-28 NOTE — CARE PLAN
The patient's goals for the shift include rest    The clinical goals for the shift include maintain safety, remain hds      Problem: Nutrition  Goal: Less than 5 days NPO/clear liquids  Outcome: Progressing  Goal: Oral intake greater than 50%  Outcome: Progressing  Goal: Oral intake greater 75%  Outcome: Progressing  Goal: Consume prescribed supplement  Outcome: Progressing  Goal: Adequate PO fluid intake  Outcome: Progressing  Goal: Nutrition support goals are met within 48 hrs  Outcome: Progressing  Goal: Nutrition support is meeting 75% of nutrient needs  Outcome: Progressing  Goal: Tube feed tolerance  Outcome: Progressing  Goal: BG  mg/dL  Outcome: Progressing  Goal: Lab values WNL  Outcome: Progressing  Goal: Electrolytes WNL  Outcome: Progressing  Goal: Promote healing  Outcome: Progressing  Goal: Maintain stable weight  Outcome: Progressing  Goal: Reduce weight from edema/fluid  Outcome: Progressing  Goal: Gradual weight gain  Outcome: Progressing  Goal: Improve ostomy output  Outcome: Progressing     Problem: Pain  Goal: Takes deep breaths with improved pain control throughout the shift  Outcome: Progressing  Goal: Turns in bed with improved pain control throughout the shift  Outcome: Progressing  Goal: Walks with improved pain control throughout the shift  Outcome: Progressing  Goal: Performs ADL's with improved pain control throughout shift  Outcome: Progressing  Goal: Participates in PT with improved pain control throughout the shift  Outcome: Progressing  Goal: Free from opioid side effects throughout the shift  Outcome: Progressing  Goal: Free from acute confusion related to pain meds throughout the shift  Outcome: Progressing     Problem: Fall/Injury  Goal: Not fall by end of shift  Outcome: Progressing  Goal: Be free from injury by end of the shift  Outcome: Progressing  Goal: Verbalize understanding of personal risk factors for fall in the hospital  Outcome: Progressing  Goal: Verbalize  understanding of risk factor reduction measures to prevent injury from fall in the home  Outcome: Progressing  Goal: Use assistive devices by end of the shift  Outcome: Progressing  Goal: Pace activities to prevent fatigue by end of the shift  Outcome: Progressing     Problem: Skin  Goal: Decreased wound size/increased tissue granulation at next dressing change  Outcome: Progressing  Goal: Participates in plan/prevention/treatment measures  Outcome: Progressing  Goal: Prevent/manage excess moisture  Outcome: Progressing  Goal: Prevent/minimize sheer/friction injuries  Outcome: Progressing  Goal: Promote/optimize nutrition  Outcome: Progressing  Goal: Promote skin healing  Outcome: Progressing     Problem: Pain - Adult  Goal: Verbalizes/displays adequate comfort level or baseline comfort level  Outcome: Progressing     Problem: Safety - Adult  Goal: Free from fall injury  Outcome: Progressing     Problem: Discharge Planning  Goal: Discharge to home or other facility with appropriate resources  Outcome: Progressing     Problem: Chronic Conditions and Co-morbidities  Goal: Patient's chronic conditions and co-morbidity symptoms are monitored and maintained or improved  Outcome: Progressing     Problem: Diabetes  Goal: Achieve decreasing blood glucose levels by end of shift  Outcome: Progressing  Goal: Increase stability of blood glucose readings by end of shift  Outcome: Progressing  Goal: Decrease in ketones present in urine by end of shift  Outcome: Progressing  Goal: Maintain electrolyte levels within acceptable range throughout shift  Outcome: Progressing  Goal: Maintain glucose levels >70mg/dl to <250mg/dl throughout shift  Outcome: Progressing  Goal: No changes in neurological exam by end of shift  Outcome: Progressing  Goal: Learn about and adhere to nutrition recommendations by end of shift  Outcome: Progressing  Goal: Vital signs within normal range for age by end of shift  Outcome: Progressing  Goal: Increase  self care and/or family involovement by end of shift  Outcome: Progressing  Goal: Receive DSME education by end of shift  Outcome: Progressing     Problem: Respiratory  Goal: Clear secretions with interventions this shift  Outcome: Progressing  Goal: Minimal/no exertional discomfort or dyspnea this shift  Outcome: Progressing  Goal: No signs of respiratory distress (eg. Use of accessory muscles. Peds grunting)  Outcome: Progressing  Goal: Tolerate pulmonary toileting this shift  Outcome: Progressing  Goal: Verbalize decreased shortness of breath this shift  Outcome: Progressing  Goal: Increase self care and/or family involvement in next 24 hours  Outcome: Progressing

## 2024-10-28 NOTE — ASSESSMENT & PLAN NOTE
Oncology consult  Pulmonology consult  Palliative care consult-Hospice  Smoking cessation  Bone scan without evidence of metastasis  MRI brain showing metastatic disease  Started on decadron   S/p lymph node biopsy   Nurse

## 2024-10-29 LAB
ALBUMIN SERPL BCP-MCNC: 2.2 G/DL (ref 3.4–5)
ALP SERPL-CCNC: 46 U/L (ref 33–136)
ALT SERPL W P-5'-P-CCNC: 31 U/L (ref 7–45)
ANION GAP SERPL CALCULATED.3IONS-SCNC: 10 MMOL/L (ref 10–20)
AST SERPL W P-5'-P-CCNC: 22 U/L (ref 9–39)
BILIRUB SERPL-MCNC: 0.4 MG/DL (ref 0–1.2)
BUN SERPL-MCNC: 9 MG/DL (ref 6–23)
CALCIUM SERPL-MCNC: 8.3 MG/DL (ref 8.6–10.3)
CELL COUNT (BLOOD): 1.29 X10*3/UL
CELL POPULATIONS: NORMAL
CHLORIDE SERPL-SCNC: 95 MMOL/L (ref 98–107)
CO2 SERPL-SCNC: 32 MMOL/L (ref 21–32)
CREAT SERPL-MCNC: 0.48 MG/DL (ref 0.5–1.05)
DIAGNOSIS: NORMAL
EGFRCR SERPLBLD CKD-EPI 2021: >90 ML/MIN/1.73M*2
ERYTHROCYTE [DISTWIDTH] IN BLOOD BY AUTOMATED COUNT: 15.6 % (ref 11.5–14.5)
FLOW DIFFERENTIAL: NORMAL
FLOW TEST ORDERED: NORMAL
GLUCOSE BLD MANUAL STRIP-MCNC: 133 MG/DL (ref 74–99)
GLUCOSE BLD MANUAL STRIP-MCNC: 142 MG/DL (ref 74–99)
GLUCOSE BLD MANUAL STRIP-MCNC: 144 MG/DL (ref 74–99)
GLUCOSE BLD MANUAL STRIP-MCNC: 258 MG/DL (ref 74–99)
GLUCOSE SERPL-MCNC: 128 MG/DL (ref 74–99)
HCT VFR BLD AUTO: 30 % (ref 36–46)
HGB BLD-MCNC: 9.7 G/DL (ref 12–16)
INR PPP: 2 (ref 0.9–1.2)
LAB TEST METHOD: NORMAL
MCH RBC QN AUTO: 30 PG (ref 26–34)
MCHC RBC AUTO-ENTMCNC: 32.3 G/DL (ref 32–36)
MCV RBC AUTO: 93 FL (ref 80–100)
NRBC BLD-RTO: 0 /100 WBCS (ref 0–0)
NUMBER OF CELLS COLLECTED: NORMAL PER TUBE
PATH REPORT.TOTAL CANCER: NORMAL
PLATELET # BLD AUTO: 401 X10*3/UL (ref 150–450)
POTASSIUM SERPL-SCNC: 3.5 MMOL/L (ref 3.5–5.3)
PROT SERPL-MCNC: 5.1 G/DL (ref 6.4–8.2)
PROTHROMBIN TIME: 19.9 SECONDS (ref 9.3–12.7)
RBC # BLD AUTO: 3.23 X10*6/UL (ref 4–5.2)
SIGNATURE COMMENT: NORMAL
SODIUM SERPL-SCNC: 133 MMOL/L (ref 136–145)
SPECIMEN VIABILITY: NORMAL
WBC # BLD AUTO: 14.5 X10*3/UL (ref 4.4–11.3)

## 2024-10-29 PROCEDURE — 2500000002 HC RX 250 W HCPCS SELF ADMINISTERED DRUGS (ALT 637 FOR MEDICARE OP, ALT 636 FOR OP/ED): Performed by: STUDENT IN AN ORGANIZED HEALTH CARE EDUCATION/TRAINING PROGRAM

## 2024-10-29 PROCEDURE — 2500000001 HC RX 250 WO HCPCS SELF ADMINISTERED DRUGS (ALT 637 FOR MEDICARE OP): Performed by: NURSE PRACTITIONER

## 2024-10-29 PROCEDURE — 94640 AIRWAY INHALATION TREATMENT: CPT

## 2024-10-29 PROCEDURE — 2500000004 HC RX 250 GENERAL PHARMACY W/ HCPCS (ALT 636 FOR OP/ED): Performed by: STUDENT IN AN ORGANIZED HEALTH CARE EDUCATION/TRAINING PROGRAM

## 2024-10-29 PROCEDURE — 85610 PROTHROMBIN TIME: CPT | Performed by: STUDENT IN AN ORGANIZED HEALTH CARE EDUCATION/TRAINING PROGRAM

## 2024-10-29 PROCEDURE — 80053 COMPREHEN METABOLIC PANEL: CPT | Performed by: STUDENT IN AN ORGANIZED HEALTH CARE EDUCATION/TRAINING PROGRAM

## 2024-10-29 PROCEDURE — 99497 ADVNCD CARE PLAN 30 MIN: CPT | Performed by: NURSE PRACTITIONER

## 2024-10-29 PROCEDURE — 2500000001 HC RX 250 WO HCPCS SELF ADMINISTERED DRUGS (ALT 637 FOR MEDICARE OP): Performed by: STUDENT IN AN ORGANIZED HEALTH CARE EDUCATION/TRAINING PROGRAM

## 2024-10-29 PROCEDURE — 9420000001 HC RT PATIENT EDUCATION 5 MIN

## 2024-10-29 PROCEDURE — 2500000001 HC RX 250 WO HCPCS SELF ADMINISTERED DRUGS (ALT 637 FOR MEDICARE OP): Performed by: REGISTERED NURSE

## 2024-10-29 PROCEDURE — 99232 SBSQ HOSP IP/OBS MODERATE 35: CPT | Performed by: NURSE PRACTITIONER

## 2024-10-29 PROCEDURE — 2500000004 HC RX 250 GENERAL PHARMACY W/ HCPCS (ALT 636 FOR OP/ED): Performed by: NURSE PRACTITIONER

## 2024-10-29 PROCEDURE — 36415 COLL VENOUS BLD VENIPUNCTURE: CPT | Performed by: STUDENT IN AN ORGANIZED HEALTH CARE EDUCATION/TRAINING PROGRAM

## 2024-10-29 PROCEDURE — 85027 COMPLETE CBC AUTOMATED: CPT | Performed by: NURSE PRACTITIONER

## 2024-10-29 PROCEDURE — 1100000001 HC PRIVATE ROOM DAILY

## 2024-10-29 PROCEDURE — 82947 ASSAY GLUCOSE BLOOD QUANT: CPT

## 2024-10-29 PROCEDURE — 94668 MNPJ CHEST WALL SBSQ: CPT

## 2024-10-29 PROCEDURE — 2500000005 HC RX 250 GENERAL PHARMACY W/O HCPCS: Performed by: STUDENT IN AN ORGANIZED HEALTH CARE EDUCATION/TRAINING PROGRAM

## 2024-10-29 PROCEDURE — 99233 SBSQ HOSP IP/OBS HIGH 50: CPT | Performed by: NURSE PRACTITIONER

## 2024-10-29 RX ORDER — OXYCODONE HYDROCHLORIDE 5 MG/1
5 TABLET ORAL EVERY 6 HOURS PRN
Status: DISCONTINUED | OUTPATIENT
Start: 2024-10-29 | End: 2024-10-29

## 2024-10-29 RX ORDER — PREDNISONE 20 MG/1
40 TABLET ORAL DAILY
Status: DISCONTINUED | OUTPATIENT
Start: 2024-10-30 | End: 2024-10-30 | Stop reason: HOSPADM

## 2024-10-29 RX ORDER — BENZONATATE 100 MG/1
100 CAPSULE ORAL 3 TIMES DAILY
Status: DISCONTINUED | OUTPATIENT
Start: 2024-10-29 | End: 2024-10-30 | Stop reason: HOSPADM

## 2024-10-29 RX ORDER — OXYCODONE HYDROCHLORIDE 5 MG/1
5 TABLET ORAL EVERY 4 HOURS PRN
Status: DISCONTINUED | OUTPATIENT
Start: 2024-10-29 | End: 2024-10-30 | Stop reason: HOSPADM

## 2024-10-29 RX ORDER — ONDANSETRON HYDROCHLORIDE 2 MG/ML
4 INJECTION, SOLUTION INTRAVENOUS EVERY 6 HOURS PRN
Status: DISCONTINUED | OUTPATIENT
Start: 2024-10-29 | End: 2024-10-30 | Stop reason: HOSPADM

## 2024-10-29 RX ORDER — TRAMADOL HYDROCHLORIDE 50 MG/1
50 TABLET ORAL EVERY 4 HOURS PRN
Status: DISCONTINUED | OUTPATIENT
Start: 2024-10-29 | End: 2024-10-30 | Stop reason: HOSPADM

## 2024-10-29 RX ORDER — DOCUSATE SODIUM 100 MG/1
100 CAPSULE, LIQUID FILLED ORAL 2 TIMES DAILY
Status: DISCONTINUED | OUTPATIENT
Start: 2024-10-29 | End: 2024-10-30 | Stop reason: HOSPADM

## 2024-10-29 RX ORDER — PREDNISONE 20 MG/1
20 TABLET ORAL ONCE
Status: COMPLETED | OUTPATIENT
Start: 2024-10-29 | End: 2024-10-29

## 2024-10-29 ASSESSMENT — PAIN DESCRIPTION - DESCRIPTORS: DESCRIPTORS: ACHING;HEADACHE

## 2024-10-29 ASSESSMENT — COGNITIVE AND FUNCTIONAL STATUS - GENERAL
CLIMB 3 TO 5 STEPS WITH RAILING: TOTAL
TURNING FROM BACK TO SIDE WHILE IN FLAT BAD: A LITTLE
PERSONAL GROOMING: A LOT
DAILY ACTIVITIY SCORE: 12
STANDING UP FROM CHAIR USING ARMS: TOTAL
DAILY ACTIVITIY SCORE: 13
WALKING IN HOSPITAL ROOM: TOTAL
MOVING FROM LYING ON BACK TO SITTING ON SIDE OF FLAT BED WITH BEDRAILS: A LITTLE
TURNING FROM BACK TO SIDE WHILE IN FLAT BAD: A LITTLE
DRESSING REGULAR LOWER BODY CLOTHING: A LOT
DRESSING REGULAR UPPER BODY CLOTHING: A LOT
MOVING FROM LYING ON BACK TO SITTING ON SIDE OF FLAT BED WITH BEDRAILS: A LITTLE
HELP NEEDED FOR BATHING: A LOT
DRESSING REGULAR LOWER BODY CLOTHING: A LOT
CLIMB 3 TO 5 STEPS WITH RAILING: TOTAL
MOBILITY SCORE: 10
MOBILITY SCORE: 11
TOILETING: TOTAL
TOILETING: TOTAL
EATING MEALS: A LITTLE
DRESSING REGULAR UPPER BODY CLOTHING: A LOT
WALKING IN HOSPITAL ROOM: TOTAL
STANDING UP FROM CHAIR USING ARMS: TOTAL
HELP NEEDED FOR BATHING: A LOT
PERSONAL GROOMING: A LOT
MOVING TO AND FROM BED TO CHAIR: TOTAL
MOVING TO AND FROM BED TO CHAIR: A LOT

## 2024-10-29 ASSESSMENT — PAIN DESCRIPTION - LOCATION
LOCATION: HEAD
LOCATION: HEAD
LOCATION: GENERALIZED

## 2024-10-29 ASSESSMENT — PAIN SCALES - GENERAL
PAINLEVEL_OUTOF10: 7
PAINLEVEL_OUTOF10: 0 - NO PAIN
PAINLEVEL_OUTOF10: 4
PAINLEVEL_OUTOF10: 3
PAINLEVEL_OUTOF10: 5 - MODERATE PAIN
PAINLEVEL_OUTOF10: 8
PAINLEVEL_OUTOF10: 10 - WORST POSSIBLE PAIN
PAINLEVEL_OUTOF10: 7
PAINLEVEL_OUTOF10: 8
PAINLEVEL_OUTOF10: 8

## 2024-10-29 ASSESSMENT — PAIN - FUNCTIONAL ASSESSMENT
PAIN_FUNCTIONAL_ASSESSMENT: 0-10

## 2024-10-29 ASSESSMENT — PAIN DESCRIPTION - ORIENTATION: ORIENTATION: MID

## 2024-10-29 NOTE — CARE PLAN
The patient's goals for the shift include rest    The clinical goals for the shift include Talk to [facility]      Problem: Nutrition  Goal: Less than 5 days NPO/clear liquids  Outcome: Progressing  Goal: Oral intake greater than 50%  Outcome: Progressing  Goal: Oral intake greater 75%  Outcome: Progressing  Goal: Consume prescribed supplement  Outcome: Progressing  Goal: Adequate PO fluid intake  Outcome: Progressing  Goal: Nutrition support goals are met within 48 hrs  Outcome: Progressing  Goal: Nutrition support is meeting 75% of nutrient needs  Outcome: Progressing  Goal: Tube feed tolerance  Outcome: Progressing  Goal: BG  mg/dL  Outcome: Progressing  Goal: Lab values WNL  Outcome: Progressing  Goal: Electrolytes WNL  Outcome: Progressing  Goal: Promote healing  Outcome: Progressing  Goal: Maintain stable weight  Outcome: Progressing  Goal: Reduce weight from edema/fluid  Outcome: Progressing  Goal: Gradual weight gain  Outcome: Progressing  Goal: Improve ostomy output  Outcome: Progressing     Problem: Pain  Goal: Takes deep breaths with improved pain control throughout the shift  Outcome: Progressing  Goal: Turns in bed with improved pain control throughout the shift  Outcome: Progressing  Goal: Walks with improved pain control throughout the shift  Outcome: Progressing  Goal: Performs ADL's with improved pain control throughout shift  Outcome: Progressing  Goal: Participates in PT with improved pain control throughout the shift  Outcome: Progressing  Goal: Free from opioid side effects throughout the shift  Outcome: Progressing  Goal: Free from acute confusion related to pain meds throughout the shift  Outcome: Progressing     Problem: Fall/Injury  Goal: Not fall by end of shift  Outcome: Progressing  Goal: Be free from injury by end of the shift  Outcome: Progressing  Goal: Verbalize understanding of personal risk factors for fall in the hospital  Outcome: Progressing  Goal: Verbalize  understanding of risk factor reduction measures to prevent injury from fall in the home  Outcome: Progressing  Goal: Use assistive devices by end of the shift  Outcome: Progressing  Goal: Pace activities to prevent fatigue by end of the shift  Outcome: Progressing     Problem: Skin  Goal: Decreased wound size/increased tissue granulation at next dressing change  Outcome: Progressing  Flowsheets (Taken 10/29/2024 0757)  Decreased wound size/increased tissue granulation at next dressing change: Promote sleep for wound healing  Goal: Participates in plan/prevention/treatment measures  Outcome: Progressing  Flowsheets (Taken 10/29/2024 0757)  Participates in plan/prevention/treatment measures: Discuss with provider PT/OT consult  Goal: Prevent/manage excess moisture  Outcome: Progressing  Flowsheets (Taken 10/29/2024 0757)  Prevent/manage excess moisture: Monitor for/manage infection if present  Goal: Prevent/minimize sheer/friction injuries  Outcome: Progressing  Flowsheets (Taken 10/29/2024 0757)  Prevent/minimize sheer/friction injuries: Use pull sheet  Goal: Promote/optimize nutrition  Outcome: Progressing  Flowsheets (Taken 10/29/2024 0757)  Promote/optimize nutrition: Monitor/record intake including meals  Goal: Promote skin healing  Outcome: Progressing  Flowsheets (Taken 10/29/2024 0757)  Promote skin healing: Turn/reposition every 2 hours/use positioning/transfer devices     Problem: Pain - Adult  Goal: Verbalizes/displays adequate comfort level or baseline comfort level  Outcome: Progressing     Problem: Safety - Adult  Goal: Free from fall injury  Outcome: Progressing     Problem: Discharge Planning  Goal: Discharge to home or other facility with appropriate resources  Outcome: Progressing     Problem: Chronic Conditions and Co-morbidities  Goal: Patient's chronic conditions and co-morbidity symptoms are monitored and maintained or improved  Outcome: Progressing     Problem: Diabetes  Goal: Achieve decreasing  blood glucose levels by end of shift  Outcome: Progressing  Goal: Increase stability of blood glucose readings by end of shift  Outcome: Progressing  Goal: Decrease in ketones present in urine by end of shift  Outcome: Progressing  Goal: Maintain electrolyte levels within acceptable range throughout shift  Outcome: Progressing  Goal: Maintain glucose levels >70mg/dl to <250mg/dl throughout shift  Outcome: Progressing  Goal: No changes in neurological exam by end of shift  Outcome: Progressing  Goal: Learn about and adhere to nutrition recommendations by end of shift  Outcome: Progressing  Goal: Vital signs within normal range for age by end of shift  Outcome: Progressing  Goal: Increase self care and/or family involovement by end of shift  Outcome: Progressing  Goal: Receive DSME education by end of shift  Outcome: Progressing     Problem: Respiratory  Goal: Clear secretions with interventions this shift  Outcome: Progressing  Goal: Minimal/no exertional discomfort or dyspnea this shift  Outcome: Progressing  Goal: No signs of respiratory distress (eg. Use of accessory muscles. Peds grunting)  Outcome: Progressing  Goal: Tolerate pulmonary toileting this shift  Outcome: Progressing  Goal: Verbalize decreased shortness of breath this shift  Outcome: Progressing  Goal: Increase self care and/or family involvement in next 24 hours  Outcome: Progressing

## 2024-10-29 NOTE — PROGRESS NOTES
Dian Perez is a 79 y.o. female on day 9 of admission presenting with Nausea and vomiting, unspecified vomiting type.    Subjective   Symptoms (0 - 10, Best to Worst)  [place] Symptom Assessment System  0-10 (Numeric) Pain Score: 0 - No pain  Co uncontrolled headache today, given oxycodone 5mg by attending service, with relief of pain. No nausea. Continues to have persistent productive cough.        Objective     Physical Exam  Vitals and nursing note reviewed.   Constitutional:       General: She is not in acute distress.     Appearance: She is ill-appearing.      Comments: Pale   HENT:      Head: Normocephalic and atraumatic.      Comments: Runny nose, congestion to posterior pharynx     Nose: Nose normal.      Mouth/Throat:      Mouth: Mucous membranes are moist.      Pharynx: Oropharynx is clear.   Eyes:      Extraocular Movements: Extraocular movements intact.      Pupils: Pupils are equal, round, and reactive to light.   Cardiovascular:      Rate and Rhythm: Normal rate and regular rhythm.      Pulses: Normal pulses.      Heart sounds: No murmur heard.  Pulmonary:      Effort: Pulmonary effort is normal. No respiratory distress.      Breath sounds: Normal breath sounds.   Abdominal:      General: Abdomen is flat. Bowel sounds are normal. There is no distension.      Palpations: Abdomen is soft.      Tenderness: There is no abdominal tenderness.   Musculoskeletal:         General: No swelling, tenderness, deformity or signs of injury. Normal range of motion.      Cervical back: Normal range of motion and neck supple.      Comments: Muscle Wasting   Skin:     General: Skin is warm and dry.      Capillary Refill: Capillary refill takes 2 to 3 seconds.   Neurological:      General: No focal deficit present.      Mental Status: She is alert and oriented to person, place, and time.   Psychiatric:         Mood and Affect: Mood normal.         Behavior: Behavior normal.         Thought Content: Thought content normal.   "       Judgment: Judgment normal.         Last Recorded Vitals  Blood pressure (!) 115/47, pulse 81, temperature 37 °C (98.6 °F), temperature source Oral, resp. rate 16, height 1.702 m (5' 7\"), weight 85.7 kg (188 lb 15 oz), SpO2 93%.  Intake/Output last 3 Shifts:  I/O last 3 completed shifts:  In: 1060 (12.4 mL/kg) [P.O.:660; IV Piggyback:400]  Out: 361 (4.2 mL/kg) [Urine:361 (0.1 mL/kg/hr)]  Weight: 85.7 kg     Relevant Results  Results for orders placed or performed during the hospital encounter of 10/20/24 (from the past 24 hours)   POCT GLUCOSE   Result Value Ref Range    POCT Glucose 286 (H) 74 - 99 mg/dL   POCT GLUCOSE   Result Value Ref Range    POCT Glucose 213 (H) 74 - 99 mg/dL   POCT GLUCOSE   Result Value Ref Range    POCT Glucose 175 (H) 74 - 99 mg/dL   CBC   Result Value Ref Range    WBC 14.5 (H) 4.4 - 11.3 x10*3/uL    nRBC 0.0 0.0 - 0.0 /100 WBCs    RBC 3.23 (L) 4.00 - 5.20 x10*6/uL    Hemoglobin 9.7 (L) 12.0 - 16.0 g/dL    Hematocrit 30.0 (L) 36.0 - 46.0 %    MCV 93 80 - 100 fL    MCH 30.0 26.0 - 34.0 pg    MCHC 32.3 32.0 - 36.0 g/dL    RDW 15.6 (H) 11.5 - 14.5 %    Platelets 401 150 - 450 x10*3/uL   Protime-INR   Result Value Ref Range    Protime 19.9 (H) 9.3 - 12.7 seconds    INR 2.0 (H) 0.9 - 1.2   Comprehensive Metabolic Panel   Result Value Ref Range    Glucose 128 (H) 74 - 99 mg/dL    Sodium 133 (L) 136 - 145 mmol/L    Potassium 3.5 3.5 - 5.3 mmol/L    Chloride 95 (L) 98 - 107 mmol/L    Bicarbonate 32 21 - 32 mmol/L    Anion Gap 10 10 - 20 mmol/L    Urea Nitrogen 9 6 - 23 mg/dL    Creatinine 0.48 (L) 0.50 - 1.05 mg/dL    eGFR >90 >60 mL/min/1.73m*2    Calcium 8.3 (L) 8.6 - 10.3 mg/dL    Albumin 2.2 (L) 3.4 - 5.0 g/dL    Alkaline Phosphatase 46 33 - 136 U/L    Total Protein 5.1 (L) 6.4 - 8.2 g/dL    AST 22 9 - 39 U/L    Bilirubin, Total 0.4 0.0 - 1.2 mg/dL    ALT 31 7 - 45 U/L   POCT GLUCOSE   Result Value Ref Range    POCT Glucose 144 (H) 74 - 99 mg/dL   POCT GLUCOSE   Result Value Ref Range    " POCT Glucose 142 (H) 74 - 99 mg/dL   POCT GLUCOSE   Result Value Ref Range    POCT Glucose 258 (H) 74 - 99 mg/dL    US guided biopsy lymph node superficial    Result Date: 10/23/2024  Interpreted By:  Florencio Ruggiero, STUDY: US GUIDED BIOPSY LYMPH NODE SUPERFICIAL; 10/23/2024 1:51 pm   INDICATION: Left infraclavicular adenopathy.   COMPARISON: None   ACCESSION NUMBER(S): [id number]   ORDERING CLINICIAN: NOLAN SORTO   TECHNIQUE: Ultrasound-guided biopsy of cervical lymph node   FINDINGS: Informed consent obtained. Patient positioned supine. Left infraclavicular region prepped and draped. Under ultrasound guidance, a 16 gauge biopsy instrument advanced to enlarged morphologically abnormal lymph node and 3 core biopsies obtained. Specimens submitted for histology and flow cytometry. Patient tolerated procedure well..       Ultrasound-guided core biopsy of enlarged left infraclavicular lymph node.     Signed by: Florencio Ruggiero 10/23/2024 3:31 PM Dictation workstation:   [id number]    MR brain w and wo IV contrast    Result Date: 10/23/2024  Interpreted By:  Pietro aKur, STUDY: MR BRAIN W AND WO IV CONTRAST; ;  10/22/2024 7:11 pm   INDICATION: Signs/Symptoms:rule out brain mets.     COMPARISON: None.   ACCESSION NUMBER(S): [id number]   ORDERING CLINICIAN: NOLAN SORTO   TECHNIQUE: MRI of the brain was performed with the acquisition of axial diffusion-weighted, axial T1, axial FLAIR, axial T2 gradient echo, axial T2 fat saturated, axial T1 fat saturated postcontrast sequence, and volumetric axial T1 post contrast sequence with multiplanar reformats.   Contrast: 16 mL of Dotarem was injected intravenously.   FINDINGS: There is no acute intracranial hemorrhage or infarct. There are 3 small lesions located within the left centrum semiovale and within the left occipital white matter which demonstrate increased signal on the diffusion-weighted sequence but without signal dropout on the ADC map which is most  consistent with T2 shine through.   There is a punctate focus of enhancement located within the right parietal lobe gray-white matter junction (series 900, image 123 of 190) which is highly suspicious for a metastasis. There are 2 rim enhancing lesions with internal regions of non enhancement located within the right cerebellum seen on series 900 images 55 and 59 which measure 5 and 8 mm respectively. These lesions are most consistent with metastases. There is mild parenchymal vasogenic edema within the surrounding brain parenchyma with no mass effect.   There is a 6 mm nodular focus of enhancement along the right occipital lobe (series 900, image 90 of 190) which is highly suspicious for a dural-based metastasis, alternatively, could reflect a small meningioma. There is diffuse dural enhancement along the bilateral cerebral convexities which is suspicious for neoplastic infiltration.   There are multiple regions of T2 hyperintensity within the bilateral cerebral hemispheric white matter which are probably sequela of chronic small vessel ischemic changes.   Ventricles, sulci, and basilar cisterns are normal in size, shape, and configuration for patient's age.   There is mild mucosal thickening located within the left maxillary sinus and within the ethmoid air cells.       1. There are 3 enhancing lesions located within the right parietal lobe and within the right cerebellum which are most consistent with metastases. The largest of these lesions is located within the right cerebellum and measures 8 mm.   2. There is mild vasogenic edema located within the right cerebellum surrounding the metastases causing no striking mass effect.   3. Subcentimeter enhancing mass arising from the right occipital lobe dura is most consistent with a metastasis versus a meningioma.   4. There is diffuse dural enhancement which is suspicious for neoplastic infiltration, however other disease processes such as infectious or inflammatory  infiltration of the meninges or reactive changes from intracranial hypotension can also cause similar appearance. There are no other imaging findings to suggest intracranial hypotension. Sequela of recent lumbar puncture can also cause diffuse dural enhancement, correlate clinically.   This study was interpreted at [facility].   MACRO: None   Signed by: Pietro Kaur 10/23/2024 10:15 AM Dictation workstation:   [id number]    NM bone whole body    Result Date: 10/22/2024  Interpreted By:  Brown Armstrong and Hanreck James STUDY: NM BONE WHOLE BODY;  10/22/2024 3:06 pm   INDICATION: Signs/Symptoms:rule out bone mets.  Lung mass seen on recent CT     COMPARISON: 10/20/2024 CT chest abdomen pelvis   ACCESSION NUMBER(S): [id number]   ORDERING CLINICIAN: NOLAN SORTO   TECHNIQUE: DIVISION OF NUCLEAR MEDICINE BONE SCAN, WHOLE BODY plus REGIONAL VIEWS   The patient received an intravenous dose of  19.8 mCi of Tc-99m MDP. Anterior and posterior images of the skeleton from skull vertex to feet were then acquired. Due to patient intolerance, no additional regional skeletal images were obtained.   FINDINGS: The exam is significantly limited by suboptimal patient positioning and lack of regional skeletal images. Within this limitation:   No focal increased radiotracer uptake to suggest osseous metastatic disease.   Expected, excreted activity is noted in the kidneys and bladder.       Limited exam due to suboptimal patient positioning and lack of regional skeletal images. Within this limitation there is no evidence of osseous metastatic disease.   I personally reviewed the images/study and I agree with the resident Vincenzo Soliman's findings as stated. This study was interpreted at [facility], [place], [place].   MACRO: None   Signed by: Brown Armstrong 10/22/2024 4:23 PM Dictation workstation:   [id number]    Transthoracic Echo (TTE)  Complete    Result Date: 10/22/2024           [facility] [id number] [place], [place] [address]            Phone [phone] TRANSTHORACIC ECHOCARDIOGRAM REPORT Patient Name:     ZION CERVANTES            Reading Physician:   [id number] Konrad Baker MD Study Date:       10/21/2024           Ordering Provider:   [id number] JAY GEIGER MRN/PID:          [id number]             Fellow: Accession#:       [id number]         Nurse: Date of           1945 / 79 years  Sonographer:         Stacey Funes RDCS Birth/Age: Gender:           F                    Additional Staff: Height:           170.18 cm            Admit Date: Weight:           75.75 kg             Admission Status:    Inpatient - Routine BSA / BMI:        1.87 m2 / 26.16      [facility]                   kg/m2 Blood Pressure: 116 /67 mmHg Study Type:    TRANSTHORACIC ECHO (TTE) COMPLETE Diagnosis/ICD: Intracardiac thrombosis, not elsewhere classified-I51.3 Indication:    mass in left atrium, and left ventricle CPT Codes:     Echo Complete w Full Doppler-[id number] Patient History: Pertinent History: DM, metastic disease, LA mass, sinus tachy, elevated                    troponin, SOB. Study Detail: The following Echo studies were performed: 2D, M-Mode, Doppler and               color flow. Definity used as a contrast agent for endocardial               border definition. Total contrast used for this procedure was 2 mL               via IV push.  Critical Event Critical Event: Test was completed as per department protocol. Critical Finding: Mass in the left atrium. Time Test was Completed: 1:35:05 PM Notified: DR. Baker. Attending notification time: 1:41:00 AM  PHYSICIAN INTERPRETATION: Left Ventricle: Left ventricular ejection fraction is normal, by visual estimate at 55-60%. There are no regional wall motion abnormalities. The left ventricular cavity size is normal. There  is mild concentric left ventricular hypertrophy involving the basal wall and septal wall. Spectral Doppler shows an impaired relaxation pattern of left ventricular diastolic filling. Left Atrium: The left atrium is upper limits of normal in size. There is a moderately sized fixed left atrial thrombus The left atrial thrombus is spherical and irregular in shape. More likely thrombus than metastatic tumor in the left atrium. Right Ventricle: The right ventricle is normal in size. There is normal right ventricular global systolic function. Right Atrium: The right atrium is upper limits of normal in size. Possible small thrombus in the right atrium. Aortic Valve: The aortic valve is trileaflet. There is evidence of mildly elevated transaortic gradients consistent with sclerosis of the aortic valve. The aortic valve dimensionless index is 0.90. There is trace aortic valve regurgitation. The peak instantaneous gradient of the aortic valve is 6.0 mmHg. The mean gradient of the aortic valve is 3.3 mmHg. Mitral Valve: The mitral valve is normal in structure. There is trace mitral valve regurgitation. Tricuspid Valve: The tricuspid valve is structurally normal. There is trace to mild tricuspid regurgitation. Pulmonic Valve: The pulmonic valve is not well visualized. There is trace pulmonic valve regurgitation. Pericardium: No pericardial effusion noted. Aorta: The aortic root is normal.  CONCLUSIONS:  1. Left ventricular ejection fraction is normal, by visual estimate at 55-60%.  2. Spectral Doppler shows an impaired relaxation pattern of left ventricular diastolic filling.  3. There is normal right ventricular global systolic function.  4. Moderately sized left atrial thrombus.  5. Aortic valve sclerosis.  6. More likely thrombus than metastatic tumor in the left atrium. QUANTITATIVE DATA SUMMARY:  2D MEASUREMENTS:           Normal Ranges: LAs:             4.55 cm   (2.7-4.0cm) IVSd:            1.06 cm   (0.6-1.1cm) LVPWd:            0.72 cm   (0.6-1.1cm) LVIDd:           3.91 cm   (3.9-5.9cm) LVIDs:           2.85 cm LV Mass Index:   55.7 g/m2 LV % FS          27.2 %  LA VOLUME:                    Normal Ranges: LA Vol A4C:        48.6 ml    (22+/-6mL/m2) LA Vol A2C:        40.0 ml LA Vol BP:         46.2 ml LA Vol Index A4C:  26.0 ml/m2 LA Vol Index A2C:  21.4 ml/m2 LA Vol Index BP:   24.7 ml/m2 LA Area A4C:       17.9 cm2 LA Area A2C:       15.5 cm2 LA Major Axis A4C: 5.6 cm LA Major Axis A2C: 5.1 cm LA Volume Index:   24.7 ml/m2 LA Vol A4C:        48.0 ml LA Vol A2C:        40.0 ml LA Vol Index BSA:  23.5 ml/m2  RA VOLUME BY A/L METHOD:            Normal Ranges: RA Vol A4C:              42.3 ml    (8.3-19.5ml) RA Vol Index A4C:        22.6 ml/m2 RA Area A4C:             16.7 cm2 RA Major Axis A4C:       5.6 cm  M-MODE MEASUREMENTS:         Normal Ranges: LAs:                 4.20 cm (2.7-4.0cm)  AORTA MEASUREMENTS:         Normal Ranges: Ao Sinus, d:        3.20 cm (2.1-3.5cm) Ao STJ, d:          3.00 cm (1.7-3.4cm) Asc Ao, d:          3.10 cm (2.1-3.4cm)  LV SYSTOLIC FUNCTION BY 2D PLANIMETRY (MOD):                      Normal Ranges: EF-A4C View:    54 % (>=55%) EF-A2C View:    60 % EF-Biplane:     58 % EF-Visual:      58 % LV EF Reported: 58 %  LV DIASTOLIC FUNCTION:           Normal Ranges: MV Peak E:             0.74 m/s  (0.7-1.2 m/s) MV Peak A:             0.81 m/s  (0.42-0.7 m/s) E/A Ratio:             0.92      (1.0-2.2) MV e'                  0.080 m/s (>8.0) MV lateral e'          0.09 m/s MV medial e'           0.07 m/s E/e' Ratio:            9.18      (<8.0)  MITRAL VALVE:          Normal Ranges: MV DT:        274 msec (150-240msec)  AORTIC VALVE:                     Normal Ranges: AoV Vmax:                1.22 m/s (<=1.7m/s) AoV Peak P.0 mmHg (<20mmHg) AoV Mean PG:             3.3 mmHg (1.7-11.5mmHg) LVOT Max Jeancarlos:            1.08 m/s (<=1.1m/s) AoV VTI:                 21.74 cm (18-25cm) LVOT VTI:                 19.64 cm LVOT Diameter:           2.24 cm  (1.8-2.4cm) AoV Area, VTI:           3.57 cm2 (2.5-5.5cm2) AoV Area,Vmax:           3.48 cm2 (2.5-4.5cm2) AoV Dimensionless Index: 0.90  RIGHT VENTRICLE: RV Basal 4.05 cm RV Major 6.3 cm TAPSE:   23.0 mm RV s'    0.16 m/s  TRICUSPID VALVE/RVSP:          Normal Ranges: Peak TR Velocity:     2.20 m/s RV Syst Pressure:     22 mmHg  (< 30mmHg) IVC Diam:             1.75 cm  AORTA: Asc Ao Diam 3.13 cm  [id number] Konrad Baker MD Electronically signed on 10/22/2024 at 8:07:52 AM  ** Final **     ECG 12 Lead    Result Date: 10/21/2024  Sinus tachycardia with occasional Premature ventricular complexes Nonspecific T wave abnormality Prolonged QT Abnormal ECG No previous ECGs available Confirmed by Pablito Caraballo ([id number]) on 10/21/2024 12:09:13 PM    CT chest abdomen pelvis w IV contrast    Result Date: 10/20/2024  Interpreted By:  Linda Stafford, STUDY: CT CHEST ABDOMEN PELVIS W IV CONTRAST;  10/20/2024 5:30 pm   INDICATION: Signs/Symptoms:n/v.   COMPARISON: None.   ACCESSION NUMBER(S): [id number]   ORDERING CLINICIAN: LUCY HOLLAND   TECHNIQUE: Axial CT images of the chest, abdomen and pelvis with coronal and sagittal reconstructed images obtained after intravenous administration of 75 mL of Omnipaque 350.   FINDINGS: CHEST:   VESSELS: Aorta is normal caliber. Atherosclerotic changes in the aorta and coronary arteries. HEART: Normal size. No pericardial effusion. There is a lobulated 2.8 x 3.6 x 2.8 cm mass in the left atrium which appears contiguous with large subcarinal hypoattenuating mass described below. MEDIASTINUM AND MONROE: Multiple enlarged mediastinal lymph nodes are present, largest in the subcarinal region demonstrates central hypoattenuation measuring 3.6 x 5.3 cm concerning for central necrosis. There is loss of fat plane with the adjacent esophagus as well as left ventricle concerning for local invasion. Additional enlarged and prominent thoracic lymph nodes noted.  LUNG, PLEURA, LARGE AIRWAYS: There is a multilobulated centrally hypointense mass in the right upper lobe and jac measuring approximately 8.6 x 12.3 x 8.3 cm. This demonstrates central foci of gas and hypoattenuation concerning for necrosis. There is marked narrowing of the right upper lobe pulmonary artery with occlusion of the right upper lobe pulmonary vein and bronchus. There is mass-effect and narrowing of the SVC. Findings are highly concerning for primary malignancy. Left lung is clear. No effusion or pneumothorax. CHEST WALL AND LOWER NECK: Subcentimeter hypodense nodule and coarse calcification in the right lobe of the thyroid gland is nonspecific. There is a heterogeneous enlarged left subpectoral mass measuring 3.8 x 3.8 cm. BONES: Generalized diffuse osteopenia. Multilevel degenerative changes of the spine. Bilateral shoulder osteoarthrosis.   ABDOMEN:   LIVER: Within normal limits. BILE DUCTS: Normal caliber. GALLBLADDER: Status post cholecystectomy. PANCREAS: Marked fatty atrophy of the pancreas. SPLEEN: Within normal limits. ADRENALS: Heterogeneous 3 cm left adrenal nodule concerning for metastases. Right adrenal glands within normal limits. KIDNEYS: Symmetric renal enhancement. No hydronephrosis or perinephric fluid collection. URETERS: No hydroureter.   VESSELS: Calcific atherosclerosis of the aortoiliac vessels. No aortic aneurysm. RETROPERITONEUM: Within normal limits.   PELVIS:   REPRODUCTIVE ORGANS: Anteverted uterus. The endometrial complex is abnormally thickened measuring up to 11 mm. No adnexal mass. BLADDER: Within normal limits.   BOWEL: Stomach is under distended. Visualized loops of bowel are without evidence for obstruction. Appendix is not identified with certainty. No pericecal inflammatory changes seen. Mural stratification of the colon likely sequela of remote colitis. A few scattered colonic diverticula without evidence for acute diverticulitis. No pneumatosis or portal venous  gas. PERITONEUM: No ascites or free air, no fluid collection.   ABDOMINAL WALL: Within normal limits. BONES: Generalized diffuse osteopenia. Multilevel degenerative changes of the spine. Mild S shaped scoliosis.       There is a large heterogeneous multilobulated mass in the right upper lobe and jac measuring approximately 8.6 x 12.3 x 8.3 cm. This demonstrates central foci of gas and hypoattenuation concerning for necrosis. There is marked narrowing of the right upper lobe pulmonary artery with occlusion of the right upper lobe pulmonary vein and bronchus. There is mass-effect and narrowing of the SVC. Findings are highly concerning for primary malignancy.   There are multiple enlarged mediastinal lymph nodes concerning for alton metastases. The largest in the subcarinal region demonstrates central hypoattenuation measuring 3.6 x 5.3 cm concerning for central necrosis. There is loss of fat plane with the adjacent esophagus and left ventricle with a lobulated 2.8 x 3.6 x 2.8 cm mass in the left atrium which is highly concerning for local invasion. Focal thrombus in the left ventricle not excluded. Further evaluation with echocardiogram is recommended.   There is a heterogeneous enlarged left subpectoral mass measuring 3.8 x 3.8 cm consistent with metastases.   Heterogeneous 3 cm left adrenal nodule is concerning for metastases.   The endometrial complex is abnormally thickened measuring up to 11 mm. Given patient's stated age differential considerations include hyperplasia versus neoplasia. Gynecological consultation further evaluation with nonemergent ultrasound is advised.   Diverticulosis without evidence for acute diverticulitis. Mural stratification of the colon likely sequela of remote colitis. Correlate with symptomatology if there is concern for superimposed early colitis.   Additional findings as described above.   MACRO: Linda Stafford discussed the significance and urgency of this critical finding by  telephone with  LUCY HOLLAND on 10/20/2024 at 6:36 pm. (**-RCF-**) Findings:  See findings.   Signed by: Linda Stafford 10/20/2024 6:44 PM Dictation workstation:   [id number]    XR chest 1 view    Result Date: 10/20/2024  Interpreted By:  Divina Garcia, STUDY: XR CHEST 1 VIEW; ;  10/20/2024 4:03 pm   INDICATION: Signs/Symptoms:weakness.     COMPARISON: 01/08/2008   ACCESSION NUMBER(S): [id number]   ORDERING CLINICIAN: LUCY HOLLAND   FINDINGS: There is a 9 x 9 cm mass like airspace opacity in the right upper lung zone silhouetting the right perihilar region. Left lung is relatively clear. No large pleural effusions within limits of portable technique. No large pneumothorax. No acute osseous findings.       9 x 9 cm masslike airspace opacity in the right upper lung zone is concerning neoplastic process. Recommend further evaluation with CT chest with contrast.     MACRO: Critical Finding:  See findings. Notification was initiated on 10/20/2024 at 4:40 pm by Dr. Divina Garcia.  (**-YCF-**) Instructions:   Signed by: Divina Garcia 10/20/2024 4:41 PM Dictation workstation:   [id number]      Assessment/Plan   IMP:    Mass of upper lobe of right lung  Oncology consult  Pulmonology consult  Palliative care consult-Hospice  Smoking cessation  Bone scan without evidence of metastasis  MRI brain showing widespread metastatic disease  Started on decadron, switched to prednisone, dose increased today for pain  S/p lymph node biopsy  Metastatic disease (Multi)  As above  Nausea and vomiting, unspecified vomiting type  Likely related to malignancy  Resolved   Left atrial mass  CT imaging showing thrombus in the left atrium and left ventricle  Off heparin drip-> on warfarin, currently on hold 2/2 elevated INR-> monitor INR daily  Echo showing EF 55-60%, diastolic dysfunction, and moderately sized left atrial thrombus   Cardiology following  Started on coumadin by cardiology  Goal INR 2-3  Prolonged Q-T interval on  ECG  QTc 616 on EKG, avoid QT prolonging medication.  Type 2 diabetes mellitus  HbA1c 6.6%  Continue SSI  Hypoglycemia protocol  Acute anemia  GI consulted  Stool occult negative  Likely related to malignancy  Continue PPI BID  Hyponatremia  Hydrochlorothiazide discontinued  Resolved   Elevated troponin  Flat troponins 15->15, type II MI demand ischemia  Sinus tachycardia  Suspect due to volume depletion  Resolved   Adrenal nodule  Incidental finding  Follow up oncology and palliative care evaluations  Consider nephrology referral at discharge for further workup   Hypothyroidism  TSH normal  Continue Synthroid  Endometrial hyperplasia  Outpatient follow-up with gynecology, will need nonemergent ultrasound  Tobacco use  Greater than 120-pack-year smoking history, recommend smoking cessation.  Pneumonia of left upper lobe due to infectious organism  Continue unasyn for post-obstructive pneumonia, can be discharged on augmentin for 14 day course per pulm     Palliative Care  Capable  Daughter Angelique is HPOA.    10/29  Plan for hospice [facility] meeting tonight [phone].   Increased prednisone today for headache.   Pain uncontrolled, dizzy. Daughter reports nausea. Patient appears more symptomatic today, overall condition declined.   Increased tramadol. Added tessalon perles routine for cough.     Met with daughter Angelique. Given uncontrolled symptoms, recommend hospice house evaluation by [facility] team.   Daughter and patient both agreeable with assessment.   Per patient, I was asked not to contact other daughter Kamille today. She only wants daughter angelique updated and participating in meeting with hospice today.     I spent 45 minutes in the professional and overall care of this patient. Additional 25mib spent with daughter and patient in acp planning.       Jimena Kan, APRN-CNP

## 2024-10-29 NOTE — PROGRESS NOTES
Dian Perez is a 79 y.o. female on day 9 of admission presenting with Nausea and vomiting, unspecified vomiting type.      Subjective   She complaints of back  pain appetite okay.  Denies any chest pain or shortness of breath in room air.  Waiting for her daughter to meet hospice to discuss with them.  No fever or chills.       Objective     Last Recorded Vitals  BP (!) 115/47 (BP Location: Right arm, Patient Position: Lying) Comment: Rn notified  Pulse 81   Temp 37 °C (98.6 °F) (Oral)   Resp 16   Wt 85.7 kg (188 lb 15 oz)   SpO2 93%   Intake/Output last 3 Shifts:    Intake/Output Summary (Last 24 hours) at 10/29/2024 1102  Last data filed at 10/29/2024 0400  Gross per 24 hour   Intake 600 ml   Output 1 ml   Net 599 ml       Admission Weight  Weight: 77.1 kg (170 lb) (10/20/24 1520)    Daily Weight  10/29/24 : 85.7 kg (188 lb 15 oz)    Image Results  US guided biopsy lymph node superficial  Narrative: Interpreted By:  Florencio Ruggiero,   STUDY:  US GUIDED BIOPSY LYMPH NODE SUPERFICIAL; 10/23/2024 1:51 pm      INDICATION:  Left infraclavicular adenopathy.      COMPARISON:  None      ACCESSION NUMBER(S):  [id number]      ORDERING CLINICIAN:  NOLAN SORTO      TECHNIQUE:  Ultrasound-guided biopsy of cervical lymph node      FINDINGS:  Informed consent obtained. Patient positioned supine. Left  infraclavicular region prepped and draped. Under ultrasound guidance,  a 16 gauge biopsy instrument advanced to enlarged morphologically  abnormal lymph node and 3 core biopsies obtained. Specimens submitted  for histology and flow cytometry. Patient tolerated procedure well..      Impression: Ultrasound-guided core biopsy of enlarged left infraclavicular lymph  node.          Signed by: Florencio Ruggiero 10/23/2024 3:31 PM  Dictation workstation:   [id number]  MR brain w and wo IV contrast  Narrative: Interpreted By:  Pietro Kaur,   STUDY:  MR BRAIN W AND WO IV CONTRAST; ;  10/22/2024 7:11 pm       INDICATION:  Signs/Symptoms:rule out brain mets.          COMPARISON:  None.      ACCESSION NUMBER(S):  [id number]      ORDERING CLINICIAN:  NOLAN SORTO      TECHNIQUE:  MRI of the brain was performed with the acquisition of axial  diffusion-weighted, axial T1, axial FLAIR, axial T2 gradient echo,  axial T2 fat saturated, axial T1 fat saturated postcontrast sequence,  and volumetric axial T1 post contrast sequence with multiplanar  reformats.      Contrast: 16 mL of Dotarem was injected intravenously.      FINDINGS:  There is no acute intracranial hemorrhage or infarct. There are 3  small lesions located within the left centrum semiovale and within  the left occipital white matter which demonstrate increased signal on  the diffusion-weighted sequence but without signal dropout on the ADC  map which is most consistent with T2 shine through.      There is a punctate focus of enhancement located within the right  parietal lobe gray-white matter junction (series 900, image 123 of  190) which is highly suspicious for a metastasis. There are 2 rim  enhancing lesions with internal regions of non enhancement located  within the right cerebellum seen on series 900 images 55 and 59 which  measure 5 and 8 mm respectively. These lesions are most consistent  with metastases. There is mild parenchymal vasogenic edema within the  surrounding brain parenchyma with no mass effect.      There is a 6 mm nodular focus of enhancement along the right  occipital lobe (series 900, image 90 of 190) which is highly  suspicious for a dural-based metastasis, alternatively, could reflect  a small meningioma. There is diffuse dural enhancement along the  bilateral cerebral convexities which is suspicious for neoplastic  infiltration.      There are multiple regions of T2 hyperintensity within the bilateral  cerebral hemispheric white matter which are probably sequela of  chronic small vessel ischemic changes.      Ventricles, sulci, and  basilar cisterns are normal in size, shape,  and configuration for patient's age.      There is mild mucosal thickening located within the left maxillary  sinus and within the ethmoid air cells.      Impression: 1. There are 3 enhancing lesions located within the right parietal  lobe and within the right cerebellum which are most consistent with  metastases. The largest of these lesions is located within the right  cerebellum and measures 8 mm.      2. There is mild vasogenic edema located within the right cerebellum  surrounding the metastases causing no striking mass effect.      3. Subcentimeter enhancing mass arising from the right occipital lobe  dura is most consistent with a metastasis versus a meningioma.      4. There is diffuse dural enhancement which is suspicious for  neoplastic infiltration, however other disease processes such as  infectious or inflammatory infiltration of the meninges or reactive  changes from intracranial hypotension can also cause similar  appearance. There are no other imaging findings to suggest  intracranial hypotension. Sequela of recent lumbar puncture can also  cause diffuse dural enhancement, correlate clinically.      This study was interpreted at [facility].      MACRO:  None      Signed by: Pietro Kaur 10/23/2024 10:15 AM  Dictation workstation:   [id number]      Physical Exam  Vitals and nursing note reviewed.   Constitutional:       Appearance: Normal appearance.   HENT:      Head: Normocephalic and atraumatic.      Nose: Nose normal.   Eyes:      Extraocular Movements: Extraocular movements intact.      Pupils: Pupils are equal, round, and reactive to light.   Cardiovascular:      Rate and Rhythm: Normal rate.   Pulmonary:      Effort: Pulmonary effort is normal.      Breath sounds: Normal breath sounds.   Abdominal:      General: Bowel sounds are normal. There is no distension.      Palpations: Abdomen is soft.      Tenderness:  There is left CVA tenderness. There is no right CVA tenderness.   Musculoskeletal:      Right lower leg: No edema.      Left lower leg: No edema.   Skin:     Coloration: Skin is pale.   Neurological:      General: No focal deficit present.      Mental Status: She is alert. Mental status is at baseline.   Psychiatric:         Mood and Affect: Mood normal.         Relevant Results               Assessment/Plan        This is a 79 years old female, who admitted with lung cancer with metastasis.  MRI brain showing metastatic disease.   bone scan shows no  evidence of metastasis.  adrenal nodule incidental finding.  patient's  wants hospice care at home        Assessment & Plan  Mass of upper lobe of right lung  Oncology consult  Pulmonology consult  Palliative care consult-Hospice  Smoking cessation  Bone scan without evidence of metastasis  MRI brain showing metastatic disease  Started on decadron   S/p lymph node biopsy  Metastatic disease (Multi)  As above  Nausea and vomiting, unspecified vomiting type  Likely related to malignancy  Resolved   Left atrial mass  CT imaging showing thrombus in the left atrium and left ventricle  Off heparin drip-> on warfarin, currently on hold 2/2 elevated INR-> monitor INR daily  Echo showing EF 55-60%, diastolic dysfunction, and moderately sized left atrial thrombus   Cardiology following  Started on coumadin by cardiology  Goal INR 2-3  Prolonged Q-T interval on ECG  QTc 616 on EKG, avoid QT prolonging medication.  Type 2 diabetes mellitus  HbA1c 6.6%  Continue SSI  Hypoglycemia protocol  Acute anemia  GI consulted  Stool occult negative  Likely related to malignancy  Continue PPI BID  Hyponatremia  Hydrochlorothiazide discontinued  Resolved   Elevated troponin  Flat troponins 15->15, type II MI demand ischemia  Sinus tachycardia  Suspect due to volume depletion  Resolved   Adrenal nodule  Incidental finding  Follow up oncology and palliative care evaluations  Consider nephrology  referral at discharge for further workup   Hypothyroidism  TSH normal  Continue Synthroid  Endometrial hyperplasia  Outpatient follow-up with gynecology, will need nonemergent ultrasound  Tobacco use  Greater than 120-pack-year smoking history, recommend smoking cessation.  Pneumonia of left upper lobe due to infectious organism  Continue unasyn for post-obstructive pneumonia, can be discharged on augmentin for 14 day course per pulm    Discharging plan  Waiting for [facility] to meet with the patient and her daughter today and discharge her with Hospice care               Bernie Francis, APRN-CNP

## 2024-10-30 VITALS
DIASTOLIC BLOOD PRESSURE: 49 MMHG | HEART RATE: 71 BPM | HEIGHT: 67 IN | RESPIRATION RATE: 20 BRPM | SYSTOLIC BLOOD PRESSURE: 127 MMHG | TEMPERATURE: 96.3 F | OXYGEN SATURATION: 94 % | WEIGHT: 188.93 LBS | BODY MASS INDEX: 29.65 KG/M2

## 2024-10-30 LAB
ALBUMIN SERPL BCP-MCNC: 2.3 G/DL (ref 3.4–5)
ALP SERPL-CCNC: 50 U/L (ref 33–136)
ALT SERPL W P-5'-P-CCNC: 23 U/L (ref 7–45)
ANION GAP SERPL CALCULATED.3IONS-SCNC: 13 MMOL/L (ref 10–20)
AST SERPL W P-5'-P-CCNC: 15 U/L (ref 9–39)
BILIRUB SERPL-MCNC: 0.4 MG/DL (ref 0–1.2)
BUN SERPL-MCNC: 9 MG/DL (ref 6–23)
CALCIUM SERPL-MCNC: 8.6 MG/DL (ref 8.6–10.3)
CHLORIDE SERPL-SCNC: 93 MMOL/L (ref 98–107)
CO2 SERPL-SCNC: 31 MMOL/L (ref 21–32)
CREAT SERPL-MCNC: 0.53 MG/DL (ref 0.5–1.05)
EGFRCR SERPLBLD CKD-EPI 2021: >90 ML/MIN/1.73M*2
ERYTHROCYTE [DISTWIDTH] IN BLOOD BY AUTOMATED COUNT: 14.8 % (ref 11.5–14.5)
GLUCOSE BLD MANUAL STRIP-MCNC: 154 MG/DL (ref 74–99)
GLUCOSE BLD MANUAL STRIP-MCNC: 272 MG/DL (ref 74–99)
GLUCOSE SERPL-MCNC: 142 MG/DL (ref 74–99)
HCT VFR BLD AUTO: 27.7 % (ref 36–46)
HGB BLD-MCNC: 9.6 G/DL (ref 12–16)
INR PPP: 2 (ref 0.9–1.2)
MCH RBC QN AUTO: 30.1 PG (ref 26–34)
MCHC RBC AUTO-ENTMCNC: 34.7 G/DL (ref 32–36)
MCV RBC AUTO: 87 FL (ref 80–100)
NRBC BLD-RTO: 0 /100 WBCS (ref 0–0)
PLATELET # BLD AUTO: 385 X10*3/UL (ref 150–450)
POTASSIUM SERPL-SCNC: 4.6 MMOL/L (ref 3.5–5.3)
PROT SERPL-MCNC: 5.5 G/DL (ref 6.4–8.2)
PROTHROMBIN TIME: 20.4 SECONDS (ref 9.3–12.7)
RBC # BLD AUTO: 3.19 X10*6/UL (ref 4–5.2)
SODIUM SERPL-SCNC: 132 MMOL/L (ref 136–145)
WBC # BLD AUTO: 15.2 X10*3/UL (ref 4.4–11.3)

## 2024-10-30 PROCEDURE — 2500000002 HC RX 250 W HCPCS SELF ADMINISTERED DRUGS (ALT 637 FOR MEDICARE OP, ALT 636 FOR OP/ED): Performed by: STUDENT IN AN ORGANIZED HEALTH CARE EDUCATION/TRAINING PROGRAM

## 2024-10-30 PROCEDURE — 82947 ASSAY GLUCOSE BLOOD QUANT: CPT

## 2024-10-30 PROCEDURE — 85027 COMPLETE CBC AUTOMATED: CPT | Performed by: NURSE PRACTITIONER

## 2024-10-30 PROCEDURE — 85610 PROTHROMBIN TIME: CPT | Performed by: STUDENT IN AN ORGANIZED HEALTH CARE EDUCATION/TRAINING PROGRAM

## 2024-10-30 PROCEDURE — 94668 MNPJ CHEST WALL SBSQ: CPT

## 2024-10-30 PROCEDURE — 80053 COMPREHEN METABOLIC PANEL: CPT | Performed by: STUDENT IN AN ORGANIZED HEALTH CARE EDUCATION/TRAINING PROGRAM

## 2024-10-30 PROCEDURE — 2500000001 HC RX 250 WO HCPCS SELF ADMINISTERED DRUGS (ALT 637 FOR MEDICARE OP): Performed by: NURSE PRACTITIONER

## 2024-10-30 PROCEDURE — 2500000001 HC RX 250 WO HCPCS SELF ADMINISTERED DRUGS (ALT 637 FOR MEDICARE OP): Performed by: STUDENT IN AN ORGANIZED HEALTH CARE EDUCATION/TRAINING PROGRAM

## 2024-10-30 PROCEDURE — 36415 COLL VENOUS BLD VENIPUNCTURE: CPT | Performed by: STUDENT IN AN ORGANIZED HEALTH CARE EDUCATION/TRAINING PROGRAM

## 2024-10-30 PROCEDURE — 9420000001 HC RT PATIENT EDUCATION 5 MIN

## 2024-10-30 PROCEDURE — 99239 HOSP IP/OBS DSCHRG MGMT >30: CPT | Performed by: NURSE PRACTITIONER

## 2024-10-30 PROCEDURE — 2500000004 HC RX 250 GENERAL PHARMACY W/ HCPCS (ALT 636 FOR OP/ED): Performed by: STUDENT IN AN ORGANIZED HEALTH CARE EDUCATION/TRAINING PROGRAM

## 2024-10-30 PROCEDURE — 2500000001 HC RX 250 WO HCPCS SELF ADMINISTERED DRUGS (ALT 637 FOR MEDICARE OP)

## 2024-10-30 PROCEDURE — 94664 DEMO&/EVAL PT USE INHALER: CPT

## 2024-10-30 PROCEDURE — 99232 SBSQ HOSP IP/OBS MODERATE 35: CPT | Performed by: NURSE PRACTITIONER

## 2024-10-30 PROCEDURE — 94640 AIRWAY INHALATION TREATMENT: CPT

## 2024-10-30 PROCEDURE — 2500000004 HC RX 250 GENERAL PHARMACY W/ HCPCS (ALT 636 FOR OP/ED): Performed by: NURSE PRACTITIONER

## 2024-10-30 PROCEDURE — 2500000001 HC RX 250 WO HCPCS SELF ADMINISTERED DRUGS (ALT 637 FOR MEDICARE OP): Performed by: REGISTERED NURSE

## 2024-10-30 RX ORDER — PANTOPRAZOLE SODIUM 40 MG/1
40 TABLET, DELAYED RELEASE ORAL
Start: 2024-10-30

## 2024-10-30 RX ORDER — POLYETHYLENE GLYCOL 3350 17 G/17G
17 POWDER, FOR SOLUTION ORAL DAILY
Start: 2024-10-31

## 2024-10-30 RX ORDER — AMOXICILLIN AND CLAVULANATE POTASSIUM 875; 125 MG/1; MG/1
1 TABLET, FILM COATED ORAL 2 TIMES DAILY
Start: 2024-10-30 | End: 2024-11-13

## 2024-10-30 ASSESSMENT — COGNITIVE AND FUNCTIONAL STATUS - GENERAL
WALKING IN HOSPITAL ROOM: TOTAL
MOVING TO AND FROM BED TO CHAIR: A LOT
DRESSING REGULAR LOWER BODY CLOTHING: A LOT
DAILY ACTIVITIY SCORE: 13
HELP NEEDED FOR BATHING: A LOT
CLIMB 3 TO 5 STEPS WITH RAILING: TOTAL
TOILETING: TOTAL
STANDING UP FROM CHAIR USING ARMS: TOTAL
DRESSING REGULAR UPPER BODY CLOTHING: A LOT
MOVING FROM LYING ON BACK TO SITTING ON SIDE OF FLAT BED WITH BEDRAILS: A LITTLE
PERSONAL GROOMING: A LOT

## 2024-10-30 ASSESSMENT — PAIN SCALES - GENERAL
PAINLEVEL_OUTOF10: 10 - WORST POSSIBLE PAIN
PAINLEVEL_OUTOF10: 4

## 2024-10-30 ASSESSMENT — PAIN DESCRIPTION - LOCATION: LOCATION: HEAD

## 2024-10-30 NOTE — CARE PLAN
The patient's goals for the shift include rest    The clinical goals for the shift include pain control, comfort

## 2024-10-30 NOTE — DISCHARGE SUMMARY
"Discharge Diagnosis  Nausea and vomiting, unspecified vomiting type    Issues Requiring Follow-Up  Hospice care    Discharge Meds     Medication List      START taking these medications     pantoprazole 40 mg EC tablet; Commonly known as: ProtoNix; Take 1 tablet   (40 mg) by mouth 2 times a day before meals. Do not crush, chew, or split.   polyethylene glycol 17 gram packet; Commonly known as: Glycolax,   Miralax; Take 17 g by mouth once daily.; Start taking on: October 31, 2024     CONTINUE taking these medications     loperamide 2 mg capsule; Commonly known as: Imodium   meclizine 25 mg tablet; Commonly known as: Antivert   potassium chloride CR 10 mEq ER tablet; Commonly known as: Klor-Con   Synthroid 150 mcg tablet; Generic drug: levothyroxine   Toprol XL 50 mg 24 hr tablet; Generic drug: metoprolol succinate XL     STOP taking these medications     glimepiride 4 mg tablet; Commonly known as: Amaryl   hydroCHLOROthiazide 25 mg tablet; Commonly known as: HYDRODiuril   RABEprazole EC tablet; Commonly known as: Aciphex       Test Results Pending At Discharge  Pending Labs       Order Current Status    Surgical Pathology Exam In process            Hospital Course  This is a 79 years old female who presented with nausea, vomiting shortness of breath myalgia, confusion and weakness.  Admitted with mass of upper lobe of right lung with metastatic disease, Mass RUL with L adrenal nodule, mediastinal LN, L subpectoral mass .  Left atrial mass.  Oncology cardiology palliative care were consulted.  Start patient to hospice facility\" to [facility] . \"  H [facility] and will be discharged with Augmentin.  Pertinent Physical Exam At Time of Discharge  Physical Exam  Vitals and nursing note reviewed.   Constitutional:       General: She is not in acute distress.     Appearance: She is ill-appearing.      Comments: Pale   HENT:      Head: Normocephalic and atraumatic.      Comments: Runny nose, congestion to posterior " pharynx     Nose: Nose normal.      Mouth/Throat:      Mouth: Mucous membranes are moist.      Pharynx: Oropharynx is clear.   Eyes:      Extraocular Movements: Extraocular movements intact.      Pupils: Pupils are equal, round, and reactive to light.   Cardiovascular:      Rate and Rhythm: Normal rate and regular rhythm.      Pulses: Normal pulses.      Heart sounds: No murmur heard.  Pulmonary:      Effort: Pulmonary effort is normal. No respiratory distress.      Breath sounds: Normal breath sounds.   Abdominal:      General: Abdomen is flat. Bowel sounds are normal. There is no distension.      Palpations: Abdomen is soft.      Tenderness: There is no abdominal tenderness.   Musculoskeletal:         General: No swelling, tenderness, deformity or signs of injury. Normal range of motion.      Cervical back: Normal range of motion and neck supple.      Comments: Muscle Wasting   Skin:     General: Skin is warm and dry.   Neurological:      General: No focal deficit present.      Mental Status: She is alert and oriented to person, place, and time.   Psychiatric:         Mood and Affect: Mood normal.         Behavior: Behavior normal.         Thought Content: Thought content normal.         Judgment: Judgment normal.      Outpatient Follow-Up  Future Appointments   Date Time Provider Department Center   11/15/2024  3:15 PM Sherlyn Graham MD [id number] [facility] MARILY Francis-CNP

## 2024-10-30 NOTE — CARE PLAN
The patient's goals for the shift include rest    The clinical goals for the shift include to be dc to with hospice      Problem: Nutrition  Goal: Less than 5 days NPO/clear liquids  Outcome: Progressing  Goal: Oral intake greater than 50%  Outcome: Progressing  Goal: Oral intake greater 75%  Outcome: Progressing  Goal: Consume prescribed supplement  Outcome: Progressing  Goal: Adequate PO fluid intake  Outcome: Progressing  Goal: Nutrition support goals are met within 48 hrs  Outcome: Progressing  Goal: Nutrition support is meeting 75% of nutrient needs  Outcome: Progressing  Goal: Tube feed tolerance  Outcome: Progressing  Goal: BG  mg/dL  Outcome: Progressing  Goal: Lab values WNL  Outcome: Progressing  Goal: Electrolytes WNL  Outcome: Progressing  Goal: Promote healing  Outcome: Progressing  Goal: Maintain stable weight  Outcome: Progressing  Goal: Reduce weight from edema/fluid  Outcome: Progressing  Goal: Gradual weight gain  Outcome: Progressing  Goal: Improve ostomy output  Outcome: Progressing     Problem: Pain  Goal: Takes deep breaths with improved pain control throughout the shift  Outcome: Progressing  Goal: Turns in bed with improved pain control throughout the shift  Outcome: Progressing  Goal: Walks with improved pain control throughout the shift  Outcome: Progressing  Goal: Performs ADL's with improved pain control throughout shift  Outcome: Progressing  Goal: Participates in PT with improved pain control throughout the shift  Outcome: Progressing  Goal: Free from opioid side effects throughout the shift  Outcome: Progressing  Goal: Free from acute confusion related to pain meds throughout the shift  Outcome: Progressing     Problem: Fall/Injury  Goal: Not fall by end of shift  Outcome: Progressing  Goal: Be free from injury by end of the shift  Outcome: Progressing  Goal: Verbalize understanding of personal risk factors for fall in the hospital  Outcome: Progressing  Goal: Verbalize  understanding of risk factor reduction measures to prevent injury from fall in the home  Outcome: Progressing  Goal: Use assistive devices by end of the shift  Outcome: Progressing  Goal: Pace activities to prevent fatigue by end of the shift  Outcome: Progressing     Problem: Skin  Goal: Decreased wound size/increased tissue granulation at next dressing change  Outcome: Progressing  Goal: Participates in plan/prevention/treatment measures  Outcome: Progressing  Goal: Prevent/manage excess moisture  Outcome: Progressing  Goal: Prevent/minimize sheer/friction injuries  Outcome: Progressing  Goal: Promote/optimize nutrition  Outcome: Progressing  Goal: Promote skin healing  Outcome: Progressing     Problem: Pain - Adult  Goal: Verbalizes/displays adequate comfort level or baseline comfort level  Outcome: Progressing     Problem: Safety - Adult  Goal: Free from fall injury  Outcome: Progressing     Problem: Discharge Planning  Goal: Discharge to home or other facility with appropriate resources  Outcome: Progressing     Problem: Chronic Conditions and Co-morbidities  Goal: Patient's chronic conditions and co-morbidity symptoms are monitored and maintained or improved  Outcome: Progressing     Problem: Diabetes  Goal: Achieve decreasing blood glucose levels by end of shift  Outcome: Progressing  Goal: Increase stability of blood glucose readings by end of shift  Outcome: Progressing  Goal: Decrease in ketones present in urine by end of shift  Outcome: Progressing  Goal: Maintain electrolyte levels within acceptable range throughout shift  Outcome: Progressing  Goal: Maintain glucose levels >70mg/dl to <250mg/dl throughout shift  Outcome: Progressing  Goal: No changes in neurological exam by end of shift  Outcome: Progressing  Goal: Learn about and adhere to nutrition recommendations by end of shift  Outcome: Progressing  Goal: Vital signs within normal range for age by end of shift  Outcome: Progressing  Goal: Increase  self care and/or family involovement by end of shift  Outcome: Progressing  Goal: Receive DSME education by end of shift  Outcome: Progressing     Problem: Respiratory  Goal: Clear secretions with interventions this shift  Outcome: Progressing  Goal: Minimal/no exertional discomfort or dyspnea this shift  Outcome: Progressing  Goal: No signs of respiratory distress (eg. Use of accessory muscles. Peds grunting)  Outcome: Progressing  Goal: Tolerate pulmonary toileting this shift  Outcome: Progressing  Goal: Verbalize decreased shortness of breath this shift  Outcome: Progressing  Goal: Increase self care and/or family involvement in next 24 hours  Outcome: Progressing

## 2024-10-30 NOTE — PROGRESS NOTES
10/30/24 0827   Discharge Planning   Expected Discharge Disposition [facility]  ([facility])

## 2024-10-30 NOTE — NURSING NOTE
Rupinder Perez  Met with patient and daughter Kenia at bedside. Reviewed hospice services, philosophy, and plan of care. Both are in agreement and requesting to discharge to [facility] for GIP. Consents signed and bed was placed on hold.     Transport packet left on patient's chart  Patient placed on [facility] with [facility].     [facility] to place phone call in morning to make sure good to discharge.    Update provided to:  Jimena Kan, ALESSANDRO Rosas, ALESSANDRO Francis, ALESSANDRO Ward, RN      Maggie Brantley, [facility] RN  [phone]

## 2024-10-30 NOTE — PROGRESS NOTES
Dian Perez is a 79 y.o. female on day 10 of admission presenting with Nausea and vomiting, unspecified vomiting type.    Subjective   Symptoms (0 - 10, Best to Worst)  [place] Symptom Assessment System  0-10 (Numeric) Pain Score: 10 - Worst possible pain  Co uncontrolled headache today, given oxycodone 5mg within past 15min. Rated 10/10, all over head. No confusion. No nausea.        Objective     Physical Exam  Vitals and nursing note reviewed.   Constitutional:       General: She is not in acute distress.     Appearance: She is ill-appearing.      Comments: Pale   HENT:      Head: Normocephalic and atraumatic.      Comments: Runny nose, congestion to posterior pharynx     Nose: Nose normal.      Mouth/Throat:      Mouth: Mucous membranes are moist.      Pharynx: Oropharynx is clear.   Eyes:      Extraocular Movements: Extraocular movements intact.      Pupils: Pupils are equal, round, and reactive to light.   Cardiovascular:      Rate and Rhythm: Normal rate and regular rhythm.      Pulses: Normal pulses.      Heart sounds: No murmur heard.  Pulmonary:      Effort: Pulmonary effort is normal. No respiratory distress.      Breath sounds: Normal breath sounds.   Abdominal:      General: Abdomen is flat. Bowel sounds are normal. There is no distension.      Palpations: Abdomen is soft.      Tenderness: There is no abdominal tenderness.   Musculoskeletal:         General: No swelling, tenderness, deformity or signs of injury. Normal range of motion.      Cervical back: Normal range of motion and neck supple.      Comments: Muscle Wasting   Skin:     General: Skin is warm and dry.      Capillary Refill: Capillary refill takes 2 to 3 seconds.   Neurological:      General: No focal deficit present.      Mental Status: She is alert and oriented to person, place, and time.   Psychiatric:         Mood and Affect: Mood normal.         Behavior: Behavior normal.         Thought Content: Thought content normal.          "Judgment: Judgment normal.         Last Recorded Vitals  Blood pressure (!) 127/49, pulse 71, temperature 35.7 °C (96.3 °F), temperature source Oral, resp. rate 20, height 1.702 m (5' 7\"), weight 85.7 kg (188 lb 15 oz), SpO2 94%.  Intake/Output last 3 Shifts:  I/O last 3 completed shifts:  In: 460 (5.4 mL/kg) [P.O.:360; IV Piggyback:100]  Out: 1 (0 mL/kg) [Urine:1 (0 mL/kg/hr)]  Weight: 85.7 kg     Relevant Results  Results for orders placed or performed during the hospital encounter of 10/20/24 (from the past 24 hours)   POCT GLUCOSE   Result Value Ref Range    POCT Glucose 142 (H) 74 - 99 mg/dL   POCT GLUCOSE   Result Value Ref Range    POCT Glucose 258 (H) 74 - 99 mg/dL   POCT GLUCOSE   Result Value Ref Range    POCT Glucose 133 (H) 74 - 99 mg/dL   Comprehensive Metabolic Panel   Result Value Ref Range    Glucose 142 (H) 74 - 99 mg/dL    Sodium 132 (L) 136 - 145 mmol/L    Potassium 4.6 3.5 - 5.3 mmol/L    Chloride 93 (L) 98 - 107 mmol/L    Bicarbonate 31 21 - 32 mmol/L    Anion Gap 13 10 - 20 mmol/L    Urea Nitrogen 9 6 - 23 mg/dL    Creatinine 0.53 0.50 - 1.05 mg/dL    eGFR >90 >60 mL/min/1.73m*2    Calcium 8.6 8.6 - 10.3 mg/dL    Albumin 2.3 (L) 3.4 - 5.0 g/dL    Alkaline Phosphatase 50 33 - 136 U/L    Total Protein 5.5 (L) 6.4 - 8.2 g/dL    AST 15 9 - 39 U/L    Bilirubin, Total 0.4 0.0 - 1.2 mg/dL    ALT 23 7 - 45 U/L   CBC   Result Value Ref Range    WBC 15.2 (H) 4.4 - 11.3 x10*3/uL    nRBC 0.0 0.0 - 0.0 /100 WBCs    RBC 3.19 (L) 4.00 - 5.20 x10*6/uL    Hemoglobin 9.6 (L) 12.0 - 16.0 g/dL    Hematocrit 27.7 (L) 36.0 - 46.0 %    MCV 87 80 - 100 fL    MCH 30.1 26.0 - 34.0 pg    MCHC 34.7 32.0 - 36.0 g/dL    RDW 14.8 (H) 11.5 - 14.5 %    Platelets 385 150 - 450 x10*3/uL   Protime-INR   Result Value Ref Range    Protime 20.4 (H) 9.3 - 12.7 seconds    INR 2.0 (H) 0.9 - 1.2   POCT GLUCOSE   Result Value Ref Range    POCT Glucose 154 (H) 74 - 99 mg/dL    US guided biopsy lymph node superficial    Result Date: " 10/23/2024  Interpreted By:  Florencio Ruggiero, STUDY: US GUIDED BIOPSY LYMPH NODE SUPERFICIAL; 10/23/2024 1:51 pm   INDICATION: Left infraclavicular adenopathy.   COMPARISON: None   ACCESSION NUMBER(S): [id number]   ORDERING CLINICIAN: NOLAN SORTO   TECHNIQUE: Ultrasound-guided biopsy of cervical lymph node   FINDINGS: Informed consent obtained. Patient positioned supine. Left infraclavicular region prepped and draped. Under ultrasound guidance, a 16 gauge biopsy instrument advanced to enlarged morphologically abnormal lymph node and 3 core biopsies obtained. Specimens submitted for histology and flow cytometry. Patient tolerated procedure well..       Ultrasound-guided core biopsy of enlarged left infraclavicular lymph node.     Signed by: Florencio Ruggiero 10/23/2024 3:31 PM Dictation workstation:   [id number]    MR brain w and wo IV contrast    Result Date: 10/23/2024  Interpreted By:  Pietro Kaur, STUDY: MR BRAIN W AND WO IV CONTRAST; ;  10/22/2024 7:11 pm   INDICATION: Signs/Symptoms:rule out brain mets.     COMPARISON: None.   ACCESSION NUMBER(S): [id number]   ORDERING CLINICIAN: NOLAN OSRTO   TECHNIQUE: MRI of the brain was performed with the acquisition of axial diffusion-weighted, axial T1, axial FLAIR, axial T2 gradient echo, axial T2 fat saturated, axial T1 fat saturated postcontrast sequence, and volumetric axial T1 post contrast sequence with multiplanar reformats.   Contrast: 16 mL of Dotarem was injected intravenously.   FINDINGS: There is no acute intracranial hemorrhage or infarct. There are 3 small lesions located within the left centrum semiovale and within the left occipital white matter which demonstrate increased signal on the diffusion-weighted sequence but without signal dropout on the ADC map which is most consistent with T2 shine through.   There is a punctate focus of enhancement located within the right parietal lobe gray-white matter junction (series 900, image 123 of 190)  which is highly suspicious for a metastasis. There are 2 rim enhancing lesions with internal regions of non enhancement located within the right cerebellum seen on series 900 images 55 and 59 which measure 5 and 8 mm respectively. These lesions are most consistent with metastases. There is mild parenchymal vasogenic edema within the surrounding brain parenchyma with no mass effect.   There is a 6 mm nodular focus of enhancement along the right occipital lobe (series 900, image 90 of 190) which is highly suspicious for a dural-based metastasis, alternatively, could reflect a small meningioma. There is diffuse dural enhancement along the bilateral cerebral convexities which is suspicious for neoplastic infiltration.   There are multiple regions of T2 hyperintensity within the bilateral cerebral hemispheric white matter which are probably sequela of chronic small vessel ischemic changes.   Ventricles, sulci, and basilar cisterns are normal in size, shape, and configuration for patient's age.   There is mild mucosal thickening located within the left maxillary sinus and within the ethmoid air cells.       1. There are 3 enhancing lesions located within the right parietal lobe and within the right cerebellum which are most consistent with metastases. The largest of these lesions is located within the right cerebellum and measures 8 mm.   2. There is mild vasogenic edema located within the right cerebellum surrounding the metastases causing no striking mass effect.   3. Subcentimeter enhancing mass arising from the right occipital lobe dura is most consistent with a metastasis versus a meningioma.   4. There is diffuse dural enhancement which is suspicious for neoplastic infiltration, however other disease processes such as infectious or inflammatory infiltration of the meninges or reactive changes from intracranial hypotension can also cause similar appearance. There are no other imaging findings to suggest intracranial  hypotension. Sequela of recent lumbar puncture can also cause diffuse dural enhancement, correlate clinically.   This study was interpreted at [facility].   MACRO: None   Signed by: Pietro Kaur 10/23/2024 10:15 AM Dictation workstation:   [id number]    NM bone whole body    Result Date: 10/22/2024  Interpreted By:  Brown Armstrong and Hanreck James STUDY: NM BONE WHOLE BODY;  10/22/2024 3:06 pm   INDICATION: Signs/Symptoms:rule out bone mets.  Lung mass seen on recent CT     COMPARISON: 10/20/2024 CT chest abdomen pelvis   ACCESSION NUMBER(S): [id number]   ORDERING CLINICIAN: NOLAN SORTO   TECHNIQUE: DIVISION OF NUCLEAR MEDICINE BONE SCAN, WHOLE BODY plus REGIONAL VIEWS   The patient received an intravenous dose of  19.8 mCi of Tc-99m MDP. Anterior and posterior images of the skeleton from skull vertex to feet were then acquired. Due to patient intolerance, no additional regional skeletal images were obtained.   FINDINGS: The exam is significantly limited by suboptimal patient positioning and lack of regional skeletal images. Within this limitation:   No focal increased radiotracer uptake to suggest osseous metastatic disease.   Expected, excreted activity is noted in the kidneys and bladder.       Limited exam due to suboptimal patient positioning and lack of regional skeletal images. Within this limitation there is no evidence of osseous metastatic disease.   I personally reviewed the images/study and I agree with the resident Vincenzo Soliman's findings as stated. This study was interpreted at [facility], [place], [place].   MACRO: None   Signed by: Brown Armstrong 10/22/2024 4:23 PM Dictation workstation:   [id number]    Transthoracic Echo (TTE) Complete    Result Date: 10/22/2024           [place] [id number]            Phone [phone] TRANSTHORACIC ECHOCARDIOGRAM REPORT Patient  Name:     ZION Mayer Physician:   [id number] Konrad Baker MD Study Date:       10/21/2024           Ordering Provider:   [id number] JAY GEIGER MRN/PID:          [id number]             Fellow: Accession#:       [id number]         Nurse: Date of           1945 / 79 years  Sonographer:         Stacey Funes [facility] Birth/Age: Gender:           F                    Additional Staff: Height:           170.18 cm            Admit Date: Weight:           75.75 kg             Admission Status:    Inpatient - Routine BSA / BMI:        1.87 m2 / 26.16      Department Location: [facility]                   kg/m2 Blood Pressure: 116 /67 mmHg Study Type:    TRANSTHORACIC ECHO (TTE) COMPLETE Diagnosis/ICD: Intracardiac thrombosis, not elsewhere classified-I51.3 Indication:    mass in left atrium, and left ventricle CPT Codes:     Echo Complete w Full [id number] Patient History: Pertinent History: DM, metastic disease, LA mass, sinus tachy, elevated                    troponin, SOB. Study Detail: The following Echo studies were performed: 2D, M-Mode, Doppler and               color flow. Definity used as a contrast agent for endocardial               border definition. Total contrast used for this procedure was 2 mL               via IV push.  Critical Event Critical Event: Test was completed as per department protocol. Critical Finding: Mass in the left atrium. Time Test was Completed: 1:35:05 PM Notified: DR. Baker. Attending notification time: 1:41:00 AM  PHYSICIAN INTERPRETATION: Left Ventricle: Left ventricular ejection fraction is normal, by visual estimate at 55-60%. There are no regional wall motion abnormalities. The left ventricular cavity size is normal. There is mild concentric left ventricular hypertrophy involving the basal wall and septal wall. Spectral Doppler shows an impaired relaxation pattern of left ventricular diastolic filling. Left  Atrium: The left atrium is upper limits of normal in size. There is a moderately sized fixed left atrial thrombus The left atrial thrombus is spherical and irregular in shape. More likely thrombus than metastatic tumor in the left atrium. Right Ventricle: The right ventricle is normal in size. There is normal right ventricular global systolic function. Right Atrium: The right atrium is upper limits of normal in size. Possible small thrombus in the right atrium. Aortic Valve: The aortic valve is trileaflet. There is evidence of mildly elevated transaortic gradients consistent with sclerosis of the aortic valve. The aortic valve dimensionless index is 0.90. There is trace aortic valve regurgitation. The peak instantaneous gradient of the aortic valve is 6.0 mmHg. The mean gradient of the aortic valve is 3.3 mmHg. Mitral Valve: The mitral valve is normal in structure. There is trace mitral valve regurgitation. Tricuspid Valve: The tricuspid valve is structurally normal. There is trace to mild tricuspid regurgitation. Pulmonic Valve: The pulmonic valve is not well visualized. There is trace pulmonic valve regurgitation. Pericardium: No pericardial effusion noted. Aorta: The aortic root is normal.  CONCLUSIONS:  1. Left ventricular ejection fraction is normal, by visual estimate at 55-60%.  2. Spectral Doppler shows an impaired relaxation pattern of left ventricular diastolic filling.  3. There is normal right ventricular global systolic function.  4. Moderately sized left atrial thrombus.  5. Aortic valve sclerosis.  6. More likely thrombus than metastatic tumor in the left atrium. QUANTITATIVE DATA SUMMARY:  2D MEASUREMENTS:           Normal Ranges: LAs:             4.55 cm   (2.7-4.0cm) IVSd:            1.06 cm   (0.6-1.1cm) LVPWd:           0.72 cm   (0.6-1.1cm) LVIDd:           3.91 cm   (3.9-5.9cm) LVIDs:           2.85 cm LV Mass Index:   55.7 g/m2 LV % FS          27.2 %  LA VOLUME:                    Normal  Ranges: LA Vol A4C:        48.6 ml    (22+/-6mL/m2) LA Vol A2C:        40.0 ml LA Vol BP:         46.2 ml LA Vol Index A4C:  26.0 ml/m2 LA Vol Index A2C:  21.4 ml/m2 LA Vol Index BP:   24.7 ml/m2 LA Area A4C:       17.9 cm2 LA Area A2C:       15.5 cm2 LA Major Axis A4C: 5.6 cm LA Major Axis A2C: 5.1 cm LA Volume Index:   24.7 ml/m2 LA Vol A4C:        48.0 ml LA Vol A2C:        40.0 ml LA Vol Index BSA:  23.5 ml/m2  RA VOLUME BY A/L METHOD:            Normal Ranges: RA Vol A4C:              42.3 ml    (8.3-19.5ml) RA Vol Index A4C:        22.6 ml/m2 RA Area A4C:             16.7 cm2 RA Major Axis A4C:       5.6 cm  M-MODE MEASUREMENTS:         Normal Ranges: LAs:                 4.20 cm (2.7-4.0cm)  AORTA MEASUREMENTS:         Normal Ranges: Ao Sinus, d:        3.20 cm (2.1-3.5cm) Ao STJ, d:          3.00 cm (1.7-3.4cm) Asc Ao, d:          3.10 cm (2.1-3.4cm)  LV SYSTOLIC FUNCTION BY 2D PLANIMETRY (MOD):                      Normal Ranges: EF-A4C View:    54 % (>=55%) EF-A2C View:    60 % EF-Biplane:     58 % EF-Visual:      58 % LV EF Reported: 58 %  LV DIASTOLIC FUNCTION:           Normal Ranges: MV Peak E:             0.74 m/s  (0.7-1.2 m/s) MV Peak A:             0.81 m/s  (0.42-0.7 m/s) E/A Ratio:             0.92      (1.0-2.2) MV e'                  0.080 m/s (>8.0) MV lateral e'          0.09 m/s MV medial e'           0.07 m/s E/e' Ratio:            9.18      (<8.0)  MITRAL VALVE:          Normal Ranges: MV DT:        274 msec (150-240msec)  AORTIC VALVE:                     Normal Ranges: AoV Vmax:                1.22 m/s (<=1.7m/s) AoV Peak P.0 mmHg (<20mmHg) AoV Mean PG:             3.3 mmHg (1.7-11.5mmHg) LVOT Max Jeancarlos:            1.08 m/s (<=1.1m/s) AoV VTI:                 21.74 cm (18-25cm) LVOT VTI:                19.64 cm LVOT Diameter:           2.24 cm  (1.8-2.4cm) AoV Area, VTI:           3.57 cm2 (2.5-5.5cm2) AoV Area,Vmax:           3.48 cm2 (2.5-4.5cm2) AoV Dimensionless Index:  0.90  RIGHT VENTRICLE: RV Basal 4.05 cm RV Major 6.3 cm TAPSE:   23.0 mm RV s'    0.16 m/s  TRICUSPID VALVE/RVSP:          Normal Ranges: Peak TR Velocity:     2.20 m/s RV Syst Pressure:     22 mmHg  (< 30mmHg) IVC Diam:             1.75 cm  AORTA: Asc Ao Diam 3.13 cm  [id number] Konrad Baker MD Electronically signed on 10/22/2024 at 8:07:52 AM  ** Final **     ECG 12 Lead    Result Date: 10/21/2024  Sinus tachycardia with occasional Premature ventricular complexes Nonspecific T wave abnormality Prolonged QT Abnormal ECG No previous ECGs available Confirmed by Pablito Caraballo ([id number]) on 10/21/2024 12:09:13 PM    CT chest abdomen pelvis w IV contrast    Result Date: 10/20/2024  Interpreted By:  Linda Stafford, STUDY: CT CHEST ABDOMEN PELVIS W IV CONTRAST;  10/20/2024 5:30 pm   INDICATION: Signs/Symptoms:n/v.   COMPARISON: None.   ACCESSION NUMBER(S): [id number]   ORDERING CLINICIAN: LUCY HOLLAND   TECHNIQUE: Axial CT images of the chest, abdomen and pelvis with coronal and sagittal reconstructed images obtained after intravenous administration of 75 mL of Omnipaque 350.   FINDINGS: CHEST:   VESSELS: Aorta is normal caliber. Atherosclerotic changes in the aorta and coronary arteries. HEART: Normal size. No pericardial effusion. There is a lobulated 2.8 x 3.6 x 2.8 cm mass in the left atrium which appears contiguous with large subcarinal hypoattenuating mass described below. MEDIASTINUM AND JAC: Multiple enlarged mediastinal lymph nodes are present, largest in the subcarinal region demonstrates central hypoattenuation measuring 3.6 x 5.3 cm concerning for central necrosis. There is loss of fat plane with the adjacent esophagus as well as left ventricle concerning for local invasion. Additional enlarged and prominent thoracic lymph nodes noted. LUNG, PLEURA, LARGE AIRWAYS: There is a multilobulated centrally hypointense mass in the right upper lobe and jac measuring approximately 8.6 x 12.3 x 8.3 cm. This demonstrates  central foci of gas and hypoattenuation concerning for necrosis. There is marked narrowing of the right upper lobe pulmonary artery with occlusion of the right upper lobe pulmonary vein and bronchus. There is mass-effect and narrowing of the SVC. Findings are highly concerning for primary malignancy. Left lung is clear. No effusion or pneumothorax. CHEST WALL AND LOWER NECK: Subcentimeter hypodense nodule and coarse calcification in the right lobe of the thyroid gland is nonspecific. There is a heterogeneous enlarged left subpectoral mass measuring 3.8 x 3.8 cm. BONES: Generalized diffuse osteopenia. Multilevel degenerative changes of the spine. Bilateral shoulder osteoarthrosis.   ABDOMEN:   LIVER: Within normal limits. BILE DUCTS: Normal caliber. GALLBLADDER: Status post cholecystectomy. PANCREAS: Marked fatty atrophy of the pancreas. SPLEEN: Within normal limits. ADRENALS: Heterogeneous 3 cm left adrenal nodule concerning for metastases. Right adrenal glands within normal limits. KIDNEYS: Symmetric renal enhancement. No hydronephrosis or perinephric fluid collection. URETERS: No hydroureter.   VESSELS: Calcific atherosclerosis of the aortoiliac vessels. No aortic aneurysm. RETROPERITONEUM: Within normal limits.   PELVIS:   REPRODUCTIVE ORGANS: Anteverted uterus. The endometrial complex is abnormally thickened measuring up to 11 mm. No adnexal mass. BLADDER: Within normal limits.   BOWEL: Stomach is under distended. Visualized loops of bowel are without evidence for obstruction. Appendix is not identified with certainty. No pericecal inflammatory changes seen. Mural stratification of the colon likely sequela of remote colitis. A few scattered colonic diverticula without evidence for acute diverticulitis. No pneumatosis or portal venous gas. PERITONEUM: No ascites or free air, no fluid collection.   ABDOMINAL WALL: Within normal limits. BONES: Generalized diffuse osteopenia. Multilevel degenerative changes of the  spine. Mild S shaped scoliosis.       There is a large heterogeneous multilobulated mass in the right upper lobe and jac measuring approximately 8.6 x 12.3 x 8.3 cm. This demonstrates central foci of gas and hypoattenuation concerning for necrosis. There is marked narrowing of the right upper lobe pulmonary artery with occlusion of the right upper lobe pulmonary vein and bronchus. There is mass-effect and narrowing of the SVC. Findings are highly concerning for primary malignancy.   There are multiple enlarged mediastinal lymph nodes concerning for alton metastases. The largest in the subcarinal region demonstrates central hypoattenuation measuring 3.6 x 5.3 cm concerning for central necrosis. There is loss of fat plane with the adjacent esophagus and left ventricle with a lobulated 2.8 x 3.6 x 2.8 cm mass in the left atrium which is highly concerning for local invasion. Focal thrombus in the left ventricle not excluded. Further evaluation with echocardiogram is recommended.   There is a heterogeneous enlarged left subpectoral mass measuring 3.8 x 3.8 cm consistent with metastases.   Heterogeneous 3 cm left adrenal nodule is concerning for metastases.   The endometrial complex is abnormally thickened measuring up to 11 mm. Given patient's stated age differential considerations include hyperplasia versus neoplasia. Gynecological consultation further evaluation with nonemergent ultrasound is advised.   Diverticulosis without evidence for acute diverticulitis. Mural stratification of the colon likely sequela of remote colitis. Correlate with symptomatology if there is concern for superimposed early colitis.   Additional findings as described above.   MACRO: Linda Stafford discussed the significance and urgency of this critical finding by telephone with  LUCY HOLLAND on 10/20/2024 at 6:36 pm. (**-RCF-**) Findings:  See findings.   Signed by: Linda Stafford 10/20/2024 6:44 PM Dictation workstation:   [id number]    XR  chest 1 view    Result Date: 10/20/2024  Interpreted By:  Divina Garcia, STUDY: XR CHEST 1 VIEW; ;  10/20/2024 4:03 pm   INDICATION: Signs/Symptoms:weakness.     COMPARISON: 01/08/2008   ACCESSION NUMBER(S): [id number]   ORDERING CLINICIAN: LUCY HOLLAND   FINDINGS: There is a 9 x 9 cm mass like airspace opacity in the right upper lung zone silhouetting the right perihilar region. Left lung is relatively clear. No large pleural effusions within limits of portable technique. No large pneumothorax. No acute osseous findings.       9 x 9 cm masslike airspace opacity in the right upper lung zone is concerning neoplastic process. Recommend further evaluation with CT chest with contrast.     MACRO: Critical Finding:  See findings. Notification was initiated on 10/20/2024 at 4:40 pm by Dr. Divina Garcia.  (**-YCF-**) Instructions:   Signed by: Divina Garcia 10/20/2024 4:41 PM Dictation workstation:   [id number]      Assessment/Plan   IMP:    Mass of upper lobe of right lung  Oncology consult  Pulmonology consult  Palliative care consult-Hospice  Smoking cessation  Bone scan without evidence of metastasis  MRI brain showing widespread metastatic disease  Started on decadron, switched to prednisone, dose increased today for pain  S/p lymph node biopsy  Metastatic disease (Multi)  As above  Nausea and vomiting, unspecified vomiting type  Likely related to malignancy  Resolved   Left atrial mass  CT imaging showing thrombus in the left atrium and left ventricle  Off heparin drip-> on warfarin, currently on hold 2/2 elevated INR-> monitor INR daily  Echo showing EF 55-60%, diastolic dysfunction, and moderately sized left atrial thrombus   Cardiology following  Started on coumadin by cardiology  Goal INR 2-3  Prolonged Q-T interval on ECG  QTc 616 on EKG, avoid QT prolonging medication.  Type 2 diabetes mellitus  HbA1c 6.6%  Continue SSI  Hypoglycemia protocol  Acute anemia  GI consulted  Stool occult negative  Likely  related to malignancy  Continue PPI BID  Hyponatremia  Hydrochlorothiazide discontinued  Resolved   Elevated troponin  Flat troponins 15->15, type II MI demand ischemia  Sinus tachycardia  Suspect due to volume depletion  Resolved   Adrenal nodule  Incidental finding  Follow up oncology and palliative care evaluations  Consider nephrology referral at discharge for further workup   Hypothyroidism  TSH normal  Continue Synthroid  Endometrial hyperplasia  Outpatient follow-up with gynecology, will need nonemergent ultrasound  Tobacco use  Greater than 120-pack-year smoking history, recommend smoking cessation.  Pneumonia of left upper lobe due to infectious organism  Continue unasyn for post-obstructive pneumonia, can be discharged on augmentin for 14 day course per pulm     Palliative Care  Capable  Matt Aquino is HPOA.    10/30  Family/Patient met with [facility] last night, consents signed. Plan for transfer to [facility] today. [facility] RN Sapphire Damian to finalize details of transfer today. Can discharge on Augmentin.   Pain uncontrolled today. Discussed with staff RN, if oxycodone 5mg ineffective, can give additional 5mg.     10/29  Plan for hospice [facility] meeting tonight [id number].   Increased prednisone today for headache.   Pain uncontrolled, dizzy. Daughter reports nausea. Patient appears more symptomatic today, overall condition declined.   Increased tramadol. Added tessalon perles routine for cough.     Met with daughter Angelique. Given uncontrolled symptoms, recommend hospice house evaluation by [facility] team.   Daughter and patient both agreeable with assessment.   Per patient, I was asked not to contact other daughter Kamille today. She only wants daughter angelique updated and participating in meeting with hospice today.     I spent 25 minutes in the professional and overall care of this patient.       Jimena Kan, APRN-CNP

## 2024-11-01 LAB
LAB AP ASR DISCLAIMER: NORMAL
LABORATORY COMMENT REPORT: NORMAL
PATH REPORT.COMMENTS IMP SPEC: NORMAL
PATH REPORT.FINAL DX SPEC: NORMAL
PATH REPORT.GROSS SPEC: NORMAL
PATH REPORT.RELEVANT HX SPEC: NORMAL
PATH REPORT.TOTAL CANCER: NORMAL

## 2024-11-05 NOTE — DOCUMENTATION CLARIFICATION NOTE
"    PATIENT:               ZION CERVANTES  ACCT #:                  [id number]  MRN:                       [id number]  :                       1945  ADMIT DATE:       10/20/2024 3:16 PM  DISCH DATE:        10/30/2024 12:45 PM  RESPONDING PROVIDER #:        [id number]          PROVIDER RESPONSE TEXT:    I concur with the pathology report findings and they are clinically significant    CDI QUERY TEXT:    Clarification    Instruction:    Based on your assessment of the patient and the clinical information, please provide the requested documentation by clicking on the appropriate radio button and enter any additional information if prompted.    Question: Please document whether you concur or do not concur with the pathology report findings    When answering this query, please exercise your independent professional judgment. The fact that a question is being asked, does not imply that any particular answer is desired or expected.    The patient's clinical indicators include:  Clinical Information: 79 Y/F presenting with nausea and vomiting and a  CT of the chest was obtained which reveals large lung mass with necrotic center, likely metastatic disease.  10/21 an US guided core biopsy  of enlarged left infraclavicular lymph node was performed.    Clinical Indicators and Pathology Findings:  10/23 Surgical Pathology Exam: \"FINAL DIAGNOSIS:    A. LYMPH NODE, LEFT INFRACLAVICULAR, ULTRASOUND-GUIDED BIOPSY:  -Metastatic poorly differentiated non-small cell carcinoma, favor squamous cell carcinoma.  See note.    Note: Neoplastic cells are positive for CAM 5.2, p40, and are negative for AE1/AE3, S100, CD20, and CD3.  A large lung mass with metastasis is noted in the patient's electronic medical record. The morphologic and immunohistochemical findings supports the above diagnosis. No definitive lymph node tissue is identified. Clinical and radiological correlation is recommended\"    Treatment: Oncology consult, Pulmonology consult, " IR consult, lymph node biopsy    Risk Factors: 63 year hx of smoking, mass of upper lobe right lung, MRI brain showing metastatic disease, enlarged mediastinal lymph nodes  Options provided:  -- I concur with the pathology report findings and they are clinically significant  -- I do not concur with the pathology report findings  -- Other - I will add my own diagnosis  -- Refer to Clinical Documentation Reviewer    Query created by: Anastacia Pedraza on 11/5/2024 3:10 PM      Electronically signed by:  LEISA CARRERO MD 11/5/2024 3:25 PM

## 2024-11-15 ENCOUNTER — APPOINTMENT (OUTPATIENT)
Dept: HEMATOLOGY/ONCOLOGY | Facility: CLINIC | Age: 79
End: 2024-11-15
Payer: MEDICARE

## 2024-11-26 LAB
GLUCOSE BLD MANUAL STRIP-MCNC: 165 MG/DL (ref 74–99)
GLUCOSE BLD MANUAL STRIP-MCNC: 228 MG/DL (ref 74–99)